# Patient Record
Sex: FEMALE | Race: WHITE | NOT HISPANIC OR LATINO | Employment: UNEMPLOYED | URBAN - METROPOLITAN AREA
[De-identification: names, ages, dates, MRNs, and addresses within clinical notes are randomized per-mention and may not be internally consistent; named-entity substitution may affect disease eponyms.]

---

## 2018-02-11 ENCOUNTER — HOSPITAL ENCOUNTER (EMERGENCY)
Facility: CLINIC | Age: 28
Discharge: HOME | End: 2018-02-11
Attending: PHYSICIAN ASSISTANT | Admitting: PHYSICIAN ASSISTANT
Payer: COMMERCIAL

## 2018-02-11 VITALS
SYSTOLIC BLOOD PRESSURE: 143 MMHG | WEIGHT: 255 LBS | TEMPERATURE: 97.8 F | DIASTOLIC BLOOD PRESSURE: 83 MMHG | OXYGEN SATURATION: 96 % | RESPIRATION RATE: 16 BRPM | HEART RATE: 109 BPM

## 2018-02-11 DIAGNOSIS — J02.0 ACUTE STREPTOCOCCAL PHARYNGITIS: ICD-10-CM

## 2018-02-11 LAB
INTERNAL QC OK POCT: YES
S PYO AG THROAT QL IA.RAPID: POSITIVE

## 2018-02-11 PROCEDURE — G0463 HOSPITAL OUTPT CLINIC VISIT: HCPCS

## 2018-02-11 PROCEDURE — 99213 OFFICE O/P EST LOW 20 MIN: CPT | Performed by: NURSE PRACTITIONER

## 2018-02-11 PROCEDURE — 87880 STREP A ASSAY W/OPTIC: CPT | Performed by: NURSE PRACTITIONER

## 2018-02-11 RX ORDER — AMOXICILLIN 500 MG/1
500 CAPSULE ORAL 2 TIMES DAILY
Qty: 20 CAPSULE | Refills: 0 | Status: SHIPPED | OUTPATIENT
Start: 2018-02-11 | End: 2018-02-21

## 2018-02-11 NOTE — ED AVS SNAPSHOT
[facility] Emergency Department    [address] [place] [address]    Phone:  [phone]    Fax:  [phone]                                       Huong Meredith   MRN: [id number]    Department:  [facility] Emergency Department   Date of Visit:  2/11/2018           Patient Information     Date Of Birth          1990        Your diagnoses for this visit were:     Acute streptococcal pharyngitis        You were seen by Arian Case PA-C and Geraldine Garcia APRN CNP.      Follow-up Information     Follow up with Jensen Patterson.    Specialty:  Family Practice    Why:  As needed    Contact information:    [facility]  [address] [place] [address]  [phone]          Discharge Instructions         Pharyngitis: Strep (Confirmed)    You have had a positive test for strep throat. Strep throat is a contagious illness. It is spread by coughing, kissing or by touching others after touching your mouth or nose. Symptoms include throat pain that is worse with swallowing, aching all over, headache, and fever. It is treated with antibiotic medicine. This should help you start to feel better in 1 to 2 days.  Home care    Rest at home. Drink plenty of fluids to you won't get dehydrated.    No work or school for the first 2 days of taking the antibiotics. After this time, you will not be contagious. You can then return to school or work if you are feeling better.     Take antibiotic medicine for the full 10 days, even if you feel better. This is very important to ensure the infection is treated. It is also important to prevent medicine-resistant germs from developing. If you were given an antibiotic shot, you don't need any more antibiotics.    You may use acetaminophen or ibuprofen to control pain or fever, unless another medicine was prescribed for this. Talk with your doctor before taking these medicines if you have chronic liver or kidney disease. Also talk with your  doctor if you have had a stomach ulcer or GI bleeding.    Throat lozenges or sprays help reduce pain. Gargling with warm saltwater will also reduce throat pain. Dissolve 1/2 teaspoon of salt in 1 glass of warm water. This may be useful just before meals.     Soft foods are OK. Avoid salty or spicy foods.  Follow-up care  Follow up with your healthcare provider or our staff if you don't get better over the next week.  When to seek medical advice  Call your healthcare provider right away if any of these occur:    Fever of 100.4 F (38 C) or higher, or as directed by your healthcare provider    New or worsening ear pain, sinus pain, or headache    Painful lumps in the back of neck    Stiff neck    Lymph nodes getting larger or becoming soft in the middle    You can't swallow liquids or you can't open your mouth wide because of throat pain    Signs of dehydration. These include very dark urine or no urine, sunken eyes, and dizziness.    Trouble breathing or noisy breathing    Muffled voice    Rash  Date Last Reviewed: 4/13/2015 2000-2017 [employer]. [address] [place] [address]. All rights reserved. This information is not intended as a substitute for professional medical care. Always follow your healthcare professional's instructions.          24 Hour Appointment Hotline       To make an appointment at any [facility] clinic, call [phone] ([phone]). If you don't have a family doctor or clinic, we will help you find one. [facility] clinics are conveniently located to serve the needs of you and your family.             Review of your medicines      START taking        Dose / Directions Last dose taken    amoxicillin 500 MG capsule   Commonly known as:  AMOXIL   Dose:  500 mg   Quantity:  20 capsule        Take 1 capsule (500 mg) by mouth 2 times daily for 10 days   Refills:  0                Prescriptions were sent or printed at these locations (1 Prescription)                  "  [facility] [place], [place] - [address]   [address] [facility] [address] [facility] [address]    Telephone:  [phone]   Fax:  [phone]   Hours:                  E-Prescribed (1 of 1)         amoxicillin (AMOXIL) 500 MG capsule                Procedures and tests performed during your visit     Rapid strep group A screen POCT      Orders Needing Specimen Collection     None      Pending Results     No orders found from 2/9/2018 to 2/12/2018.            Pending Culture Results     No orders found from 2/9/2018 to 2/12/2018.            Pending Results Instructions     If you had any lab results that were not finalized at the time of your Discharge, you can call the ED Lab Result RN at [phone]. You will be contacted by this team for any positive Lab results or changes in treatment. The nurses are available 7 days a week from 10A to 6:30P.  You can leave a message 24 hours per day and they will return your call.        Test Results From Your Hospital Stay        2/11/2018 12:53 PM      Component Results     Component Value Ref Range & Units Status    Rapid Strep A Screen Positive neg Final    Internal QC OK Yes  Final                Thank you for choosing [facility]       Thank you for choosing [facility] for your care. Our goal is always to provide you with excellent care. Hearing back from our patients is one way we can continue to improve our services. Please take a few minutes to complete the written survey that you may receive in the mail after you visit with us. Thank you!        [employer] Information     [employer] lets you send messages to your doctor, view your test results, renew your prescriptions, schedule appointments and more. To sign up, go to [email or web address] . Click on \"Log in\" on the left side of the screen, which will take you to the Welcome page. Then click on \"Sign up Now\" on the right side of the page.     You will be asked to enter the access code listed below, as well as some " personal information. Please follow the directions to create your username and password.     Your access code is: [email or web address]  Expires: 2018 12:53 PM     Your access code will  in 90 days. If you need help or a new code, please call your [facility] clinic or [phone].        Care EveryWhere ID     This is your Care EveryWhere ID. This could be used by other organizations to access your [facility] medical records  [id number]        Equal Access to Services     [facility] : Hadnalini castilloo Sodana, waaxda luqadaha, qaybta kaalmada adecatarina, jeronimo sadler . So [facility] [phone].    ATENCIÓN: Si habla español, tiene a covington disposición servicios gratuitos de asistencia lingüística. Llame al [phone].    We comply with applicable federal civil rights laws and [place] laws. We do not discriminate on the basis of race, color, national origin, age, disability, sex, sexual orientation, or gender identity.            After Visit Summary       This is your record. Keep this with you and show to your community pharmacist(s) and doctor(s) at your next visit.

## 2018-02-11 NOTE — ED PROVIDER NOTES
History     Chief Complaint   Patient presents with     Pharyngitis     HPI  Huong Meredith is a 27 year old female who presents to urgent care for evaluation of sore throat.  Symptoms started yesterday.  Denies cough or nasal congestion.  Denies fever or chills.  Denies chest pain or shortness of breath.  Exposed to 3 children with similar symptoms were also being evaluated here in urgent care today.  Patient is a non-smoker.    Problem List:    There are no active problems to display for this patient.       Past Medical History:    History reviewed. No pertinent past medical history.    Past Surgical History:    History reviewed. No pertinent surgical history.    Family History:    No family history on file.    Social History:  Marital Status:  Single [1]  Social History   Substance Use Topics     Smoking status: Never Smoker     Smokeless tobacco: Never Used     Alcohol use Not on file        Medications:      amoxicillin (AMOXIL) 500 MG capsule         Review of Systems  As mentioned above in the history present illness. All other systems were reviewed and are negative.    Physical Exam   BP: 143/83  Pulse: 109  Temp: 97.8  F (36.6  C)  Resp: 16  Weight: 115.7 kg (255 lb)  SpO2: 96 %      Physical Exam  GENERAL APPEARANCE: healthy, alert and no distress  EYES: EOMI,  PERRL, conjunctiva clear  HENT: ear canals and TM's normal.  Nose normal.  Posterior oropharynx erythema without exudate.  Uvula is midline.  No unilateral peritonsillar swelling.  NECK: supple, nontender, no lymphadenopathy  RESP: lungs clear to auscultation - no rales, rhonchi or wheezes  CV: regular rates and rhythm, normal S1 S2, no murmur noted    ED Course     ED Course     Procedures             Results for orders placed or performed during the hospital encounter of 02/11/18 (from the past 48 hour(s))   Rapid strep group A screen POCT   Result Value Ref Range    Rapid Strep A Screen Positive neg    Internal QC OK Yes        Labs Ordered  and Resulted from Time of ED Arrival Up to the Time of Departure from the ED   RAPID STREP GROUP A SCREEN POCT - Abnormal; Notable for the following:        Assessments & Plan (with Medical Decision Making)   RST positive.  Patient will be initiated on Amoxicillin.  Patient and family notified contagious for 24 hours after initiating antibiotics. Recommend replacement of toothbrush at that time.  Follow up with PCP if no improvement in three days.  Worrisome reasons to seek care sooner discussed.      I have reviewed the nursing notes.    I have reviewed the findings, diagnosis, plan and need for follow up with the patient.      New Prescriptions    AMOXICILLIN (AMOXIL) 500 MG CAPSULE    Take 1 capsule (500 mg) by mouth 2 times daily for 10 days       Final diagnoses:   Acute streptococcal pharyngitis       2/11/2018   [facility] EMERGENCY DEPARTMENT     Geraldine Garcia APRN CNP  02/11/18 [id number]

## 2018-02-11 NOTE — DISCHARGE INSTRUCTIONS
Pharyngitis: Strep (Confirmed)    You have had a positive test for strep throat. Strep throat is a contagious illness. It is spread by coughing, kissing or by touching others after touching your mouth or nose. Symptoms include throat pain that is worse with swallowing, aching all over, headache, and fever. It is treated with antibiotic medicine. This should help you start to feel better in 1 to 2 days.  Home care    Rest at home. Drink plenty of fluids to you won't get dehydrated.    No work or school for the first 2 days of taking the antibiotics. After this time, you will not be contagious. You can then return to school or work if you are feeling better.     Take antibiotic medicine for the full 10 days, even if you feel better. This is very important to ensure the infection is treated. It is also important to prevent medicine-resistant germs from developing. If you were given an antibiotic shot, you don't need any more antibiotics.    You may use acetaminophen or ibuprofen to control pain or fever, unless another medicine was prescribed for this. Talk with your doctor before taking these medicines if you have chronic liver or kidney disease. Also talk with your doctor if you have had a stomach ulcer or GI bleeding.    Throat lozenges or sprays help reduce pain. Gargling with warm saltwater will also reduce throat pain. Dissolve 1/2 teaspoon of salt in 1 glass of warm water. This may be useful just before meals.     Soft foods are OK. Avoid salty or spicy foods.  Follow-up care  Follow up with your healthcare provider or our staff if you don't get better over the next week.  When to seek medical advice  Call your healthcare provider right away if any of these occur:    Fever of 100.4 F (38 C) or higher, or as directed by your healthcare provider    New or worsening ear pain, sinus pain, or headache    Painful lumps in the back of neck    Stiff neck    Lymph nodes getting larger or becoming soft in the  middle    You can't swallow liquids or you can't open your mouth wide because of throat pain    Signs of dehydration. These include very dark urine or no urine, sunken eyes, and dizziness.    Trouble breathing or noisy breathing    Muffled voice    Rash  Date Last Reviewed: 4/13/2015 2000-2017 [employer]. [address] [place] [address], [place] [address]. All rights reserved. This information is not intended as a substitute for professional medical care. Always follow your healthcare professional's instructions.

## 2018-02-11 NOTE — ED NOTES
Patient here for cough/URI, symptoms started 7 days ago.  Patient presents ambulatory to the urgent care.  Rapid strep ordered per protocol.

## 2018-02-11 NOTE — ED AVS SNAPSHOT
[facility] Emergency Department    [address] [address]    Phone:  [phone]    Fax:  [phone]                                       Huong Meredith   MRN: [id number]    Department:  [facility] Emergency Department   Date of Visit:  2/11/2018           After Visit Summary Signature Page     I have received my discharge instructions, and my questions have been answered. I have discussed any challenges I see with this plan with the nurse or doctor.    ..........................................................................................................................................  Patient/Patient Representative Signature      ..........................................................................................................................................  Patient Representative Print Name and Relationship to Patient    ..................................................               ................................................  Date                                            Time    ..........................................................................................................................................  Reviewed by Signature/Title    ...................................................              ..............................................  Date                                                            Time

## 2020-09-14 ENCOUNTER — APPOINTMENT (OUTPATIENT)
Dept: GENERAL RADIOLOGY | Facility: CLINIC | Age: 30
End: 2020-09-14
Attending: FAMILY MEDICINE
Payer: COMMERCIAL

## 2020-09-14 ENCOUNTER — HOSPITAL ENCOUNTER (EMERGENCY)
Facility: CLINIC | Age: 30
Discharge: HOME | End: 2020-09-14
Attending: FAMILY MEDICINE | Admitting: FAMILY MEDICINE
Payer: COMMERCIAL

## 2020-09-14 ENCOUNTER — APPOINTMENT (OUTPATIENT)
Dept: CT IMAGING | Facility: CLINIC | Age: 30
End: 2020-09-14
Attending: FAMILY MEDICINE
Payer: COMMERCIAL

## 2020-09-14 VITALS
DIASTOLIC BLOOD PRESSURE: 67 MMHG | TEMPERATURE: 97.9 F | HEIGHT: 64 IN | BODY MASS INDEX: 43.71 KG/M2 | HEART RATE: 73 BPM | OXYGEN SATURATION: 100 % | SYSTOLIC BLOOD PRESSURE: 111 MMHG | WEIGHT: 256 LBS | RESPIRATION RATE: 18 BRPM

## 2020-09-14 DIAGNOSIS — R51.9 ACUTE NONINTRACTABLE HEADACHE, UNSPECIFIED HEADACHE TYPE: ICD-10-CM

## 2020-09-14 DIAGNOSIS — R20.2 PARESTHESIAS: ICD-10-CM

## 2020-09-14 DIAGNOSIS — H65.191 ACUTE MEE (MIDDLE EAR EFFUSION), RIGHT: ICD-10-CM

## 2020-09-14 LAB
ANION GAP SERPL CALCULATED.3IONS-SCNC: 5 MMOL/L (ref 3–14)
APTT PPP: 30 SEC (ref 22–37)
B-HCG SERPL-ACNC: <1 IU/L (ref 0–5)
BASOPHILS # BLD AUTO: 0.1 10E9/L (ref 0–0.2)
BASOPHILS NFR BLD AUTO: 0.6 %
BUN SERPL-MCNC: 9 MG/DL (ref 7–30)
CALCIUM SERPL-MCNC: 9 MG/DL (ref 8.5–10.1)
CHLORIDE SERPL-SCNC: 106 MMOL/L (ref 94–109)
CO2 SERPL-SCNC: 26 MMOL/L (ref 20–32)
CREAT SERPL-MCNC: 0.79 MG/DL (ref 0.52–1.04)
DIFFERENTIAL METHOD BLD: ABNORMAL
EOSINOPHIL # BLD AUTO: 0.3 10E9/L (ref 0–0.7)
EOSINOPHIL NFR BLD AUTO: 2.3 %
ERYTHROCYTE [DISTWIDTH] IN BLOOD BY AUTOMATED COUNT: 13.1 % (ref 10–15)
GFR SERPL CREATININE-BSD FRML MDRD: >90 ML/MIN/{1.73_M2}
GLUCOSE SERPL-MCNC: 103 MG/DL (ref 70–99)
HCT VFR BLD AUTO: 42.8 % (ref 35–47)
HGB BLD-MCNC: 14.3 G/DL (ref 11.7–15.7)
IMM GRANULOCYTES # BLD: 0 10E9/L (ref 0–0.4)
IMM GRANULOCYTES NFR BLD: 0.3 %
INR PPP: 1.06 (ref 0.86–1.14)
LYMPHOCYTES # BLD AUTO: 3.9 10E9/L (ref 0.8–5.3)
LYMPHOCYTES NFR BLD AUTO: 33.1 %
MCH RBC QN AUTO: 28.5 PG (ref 26.5–33)
MCHC RBC AUTO-ENTMCNC: 33.4 G/DL (ref 31.5–36.5)
MCV RBC AUTO: 85 FL (ref 78–100)
MONOCYTES # BLD AUTO: 0.6 10E9/L (ref 0–1.3)
MONOCYTES NFR BLD AUTO: 5.1 %
NEUTROPHILS # BLD AUTO: 7 10E9/L (ref 1.6–8.3)
NEUTROPHILS NFR BLD AUTO: 58.6 %
NRBC # BLD AUTO: 0 10*3/UL
NRBC BLD AUTO-RTO: 0 /100
PLATELET # BLD AUTO: 297 10E9/L (ref 150–450)
POTASSIUM SERPL-SCNC: 3.5 MMOL/L (ref 3.4–5.3)
RBC # BLD AUTO: 5.01 10E12/L (ref 3.8–5.2)
SODIUM SERPL-SCNC: 137 MMOL/L (ref 133–144)
TROPONIN I SERPL-MCNC: <0.015 UG/L (ref 0–0.04)
WBC # BLD AUTO: 11.9 10E9/L (ref 4–11)

## 2020-09-14 PROCEDURE — 84702 CHORIONIC GONADOTROPIN TEST: CPT | Performed by: FAMILY MEDICINE

## 2020-09-14 PROCEDURE — 85610 PROTHROMBIN TIME: CPT | Performed by: FAMILY MEDICINE

## 2020-09-14 PROCEDURE — 25800030 ZZH RX IP 258 OP 636: Performed by: FAMILY MEDICINE

## 2020-09-14 PROCEDURE — 99285 EMERGENCY DEPT VISIT HI MDM: CPT | Mod: 25 | Performed by: FAMILY MEDICINE

## 2020-09-14 PROCEDURE — 85025 COMPLETE CBC W/AUTO DIFF WBC: CPT | Performed by: FAMILY MEDICINE

## 2020-09-14 PROCEDURE — 25000125 ZZHC RX 250: Performed by: FAMILY MEDICINE

## 2020-09-14 PROCEDURE — 70496 CT ANGIOGRAPHY HEAD: CPT

## 2020-09-14 PROCEDURE — 72040 X-RAY EXAM NECK SPINE 2-3 VW: CPT

## 2020-09-14 PROCEDURE — 93005 ELECTROCARDIOGRAM TRACING: CPT | Performed by: FAMILY MEDICINE

## 2020-09-14 PROCEDURE — 93010 ELECTROCARDIOGRAM REPORT: CPT | Mod: Z6 | Performed by: FAMILY MEDICINE

## 2020-09-14 PROCEDURE — 84484 ASSAY OF TROPONIN QUANT: CPT | Performed by: FAMILY MEDICINE

## 2020-09-14 PROCEDURE — 85730 THROMBOPLASTIN TIME PARTIAL: CPT | Performed by: FAMILY MEDICINE

## 2020-09-14 PROCEDURE — 25000128 H RX IP 250 OP 636: Performed by: FAMILY MEDICINE

## 2020-09-14 PROCEDURE — 80048 BASIC METABOLIC PNL TOTAL CA: CPT | Performed by: FAMILY MEDICINE

## 2020-09-14 PROCEDURE — 96360 HYDRATION IV INFUSION INIT: CPT | Mod: 59 | Performed by: FAMILY MEDICINE

## 2020-09-14 PROCEDURE — 70450 CT HEAD/BRAIN W/O DYE: CPT

## 2020-09-14 RX ORDER — IOPAMIDOL 755 MG/ML
70 INJECTION, SOLUTION INTRAVASCULAR ONCE
Status: COMPLETED | OUTPATIENT
Start: 2020-09-14 | End: 2020-09-14

## 2020-09-14 RX ORDER — CYCLOBENZAPRINE HCL 10 MG
10 TABLET ORAL 3 TIMES DAILY PRN
Qty: 20 TABLET | Refills: 0 | Status: SHIPPED | OUTPATIENT
Start: 2020-09-14 | End: 2020-09-20

## 2020-09-14 RX ADMIN — SODIUM CHLORIDE 100 ML: 9 INJECTION, SOLUTION INTRAVENOUS at 07:20

## 2020-09-14 RX ADMIN — IOPAMIDOL 70 ML: 755 INJECTION, SOLUTION INTRAVENOUS at 07:20

## 2020-09-14 RX ADMIN — SODIUM CHLORIDE 500 ML: 9 INJECTION, SOLUTION INTRAVENOUS at 06:32

## 2020-09-14 ASSESSMENT — MIFFLIN-ST. JEOR: SCORE: 1876.21

## 2020-09-14 NOTE — DISCHARGE INSTRUCTIONS
Ibuprofen 400 mg p.o. 4 times daily with food.  Flexeril for muscle spasm.  Back and neck rest; this means no heavy lifting or straining.  Ice alternating with heat to the neck and base of skull over the next 48 to 72 hours.  If not improving please follow-up in clinic with your primary care provider.   Patient with one or more new problems requiring additional work-up/treatment.

## 2020-09-14 NOTE — ED AVS SNAPSHOT
[facility] Emergency Department  [address] [address]  Phone:  [phone]  Fax:  [phone]                                    Huong Meredith   MRN: [id number]    Department:  [facility] Emergency Department   Date of Visit:  9/14/2020           After Visit Summary Signature Page    I have received my discharge instructions, and my questions have been answered. I have discussed any challenges I see with this plan with the nurse or doctor.    ..........................................................................................................................................  Patient/Patient Representative Signature      ..........................................................................................................................................  Patient Representative Print Name and Relationship to Patient    ..................................................               ................................................  Date                                   Time    ..........................................................................................................................................  Reviewed by Signature/Title    ...................................................              ..............................................  Date                                               Time          22EPIC Rev 08/18

## 2020-09-14 NOTE — ED TRIAGE NOTES
Pt reports onset of numbness feeling Tuesday afternoon in area of her scalp. Pt then felt episode of zinging/stab from top of scap to posterior neck. [pt states it throbbed for 10 minutes, worse with neck movement. Pt did talk to primary about it Friday, though now concerned that sensation of pins and needles has moved ot other side of head and now has sensation of fullness in right ear.     Pt denies headache hx. Pt states she took tylenol with no change to sx. Pt took Excedrin at midnight with no change.

## 2020-09-14 NOTE — ED NOTES
"Pt reports last period 28 days late and was \"very\" light. Pt states she probably isn't pregnant but is unsure. HCG level ordered.   "

## 2020-09-14 NOTE — ED PROVIDER NOTES
"  History     Chief Complaint   Patient presents with     Headache     c/o numb sensation on part of scalp, area of \"pins and needles\" and now ear fullness on right side     HPI  Huong Meredith is a 30 year old female, past medical history is significant for morbid obesity, environmental allergies, presents the emergency department with concerns of headache of approximately 6 days duration intermittent dull aching with pins and needle sensation initially beginning on the top of her head and now more localized to the left retro-orbital area in conjunction with right ear fullness which is developed over the last 48 hours.  No vision changes such as double vision blurred vision.  There is no nausea or vomiting.  The patient is taken over-the-counter pain medications in the form of ibuprofen and Tylenol without relief.  She states that she spoke briefly to her primary care provider 3 days ago and was told to revisit this in clinic in approximately 1 week.  She presents now because it is worse in the headache now seems to localize more to the left retro-orbital area as described as well as the ear fullness.  She initially denied any other symptoms when questioned in general but however on review of systems identified tingling and numbness in the left fifth digit when she bends her elbow and is using her cell phone.  It feels clumsy.  This is also been going on for about the last 48 hours.  Patient is a vague historian.  She states his headache is unlike anything she is ever had.  She is concerned about a pinched nerve in her neck.      Allergies:  No Known Allergies    Problem List:    There are no active problems to display for this patient.       Past Medical History:    No past medical history on file.    Past Surgical History:    No past surgical history on file.    Family History:    No family history on file.    Social History:  Marital Status:  Single [1]  Social History     Tobacco Use     Smoking status: Never " "Smoker     Smokeless tobacco: Never Used   Substance Use Topics     Alcohol use: Not on file     Drug use: Not on file        Medications:    No current outpatient medications on file.        Review of Systems   All other systems reviewed and are negative.      Physical Exam   BP: 132/81  Pulse: 93  Temp: 97.9  F (36.6  C)  Resp: 18  Height: 162.6 cm (5' 4\")  Weight: 116.1 kg (256 lb)  SpO2: 99 %      Physical Exam  Vitals signs and nursing note reviewed.   Constitutional:       General: She is not in acute distress.     Appearance: Normal appearance. She is obese. She is not ill-appearing.   HENT:      Head: Normocephalic and atraumatic.      Right Ear: Tympanic membrane normal.      Left Ear: Tympanic membrane normal.      Nose: Nose normal.      Mouth/Throat:      Mouth: Mucous membranes are dry.      Pharynx: Oropharynx is clear.   Eyes:      Extraocular Movements: Extraocular movements intact.      Conjunctiva/sclera: Conjunctivae normal.   Neck:      Musculoskeletal: Normal range of motion.   Cardiovascular:      Rate and Rhythm: Normal rate and regular rhythm.      Pulses: Normal pulses.      Heart sounds: Normal heart sounds.   Pulmonary:      Effort: Pulmonary effort is normal.      Breath sounds: Normal breath sounds.   Abdominal:      General: Bowel sounds are normal.      Palpations: Abdomen is soft.   Musculoskeletal: Normal range of motion.   Skin:     General: Skin is warm and dry.      Capillary Refill: Capillary refill takes less than 2 seconds.   Neurological:      General: No focal deficit present.      Mental Status: She is alert and oriented to person, place, and time.      GCS: GCS eye subscore is 4. GCS verbal subscore is 5. GCS motor subscore is 6.      Cranial Nerves: Cranial nerves are intact.      Sensory: Sensation is intact.      Motor: Motor function is intact.      Coordination: Coordination is intact.      Gait: Gait is intact.      Deep Tendon Reflexes: Babinski sign absent on the " right side. Babinski sign absent on the left side.      Reflex Scores:       Tricep reflexes are 2+ on the right side and 2+ on the left side.       Bicep reflexes are 2+ on the right side and 2+ on the left side.       Brachioradialis reflexes are 2+ on the right side and 2+ on the left side.       Patellar reflexes are 2+ on the right side and 2+ on the left side.       Achilles reflexes are 2+ on the right side and 2+ on the left side.  Psychiatric:         Mood and Affect: Mood normal.         Behavior: Behavior normal.         ED Course        Procedures               EKG Interpretation:      Interpreted by Darrel Barrios MD  Time reviewed: Time obtained 6:28 AM time interpreted same 75 bpm sinus rhythm no acute ST-T wave changes.  Normal axis, normal intervals impression normal EKG      Critical Care time:  none               Results for orders placed or performed during the hospital encounter of 09/14/20 (from the past 24 hour(s))   CBC with platelets differential   Result Value Ref Range    WBC 11.9 (H) 4.0 - 11.0 10e9/L    RBC Count 5.01 3.8 - 5.2 10e12/L    Hemoglobin 14.3 11.7 - 15.7 g/dL    Hematocrit 42.8 35.0 - 47.0 %    MCV 85 78 - 100 fl    MCH 28.5 26.5 - 33.0 pg    MCHC 33.4 31.5 - 36.5 g/dL    RDW 13.1 10.0 - 15.0 %    Platelet Count 297 150 - 450 10e9/L    Diff Method Automated Method     % Neutrophils 58.6 %    % Lymphocytes 33.1 %    % Monocytes 5.1 %    % Eosinophils 2.3 %    % Basophils 0.6 %    % Immature Granulocytes 0.3 %    Nucleated RBCs 0 0 /100    Absolute Neutrophil 7.0 1.6 - 8.3 10e9/L    Absolute Lymphocytes 3.9 0.8 - 5.3 10e9/L    Absolute Monocytes 0.6 0.0 - 1.3 10e9/L    Absolute Eosinophils 0.3 0.0 - 0.7 10e9/L    Absolute Basophils 0.1 0.0 - 0.2 10e9/L    Abs Immature Granulocytes 0.0 0 - 0.4 10e9/L    Absolute Nucleated RBC 0.0    Basic metabolic panel   Result Value Ref Range    Sodium 137 133 - 144 mmol/L    Potassium 3.5 3.4 - 5.3 mmol/L    Chloride 106 94 - 109  mmol/L    Carbon Dioxide 26 20 - 32 mmol/L    Anion Gap 5 3 - 14 mmol/L    Glucose 103 (H) 70 - 99 mg/dL    Urea Nitrogen 9 7 - 30 mg/dL    Creatinine 0.79 0.52 - 1.04 mg/dL    GFR Estimate >90 >60 mL/min/[1.73_m2]    GFR Estimate If Black >90 >60 mL/min/[1.73_m2]    Calcium 9.0 8.5 - 10.1 mg/dL   INR   Result Value Ref Range    INR 1.06 0.86 - 1.14   Partial thromboplastin time   Result Value Ref Range    PTT 30 22 - 37 sec   Troponin I   Result Value Ref Range    Troponin I ES <0.015 0.000 - 0.045 ug/L   HCG quantitative pregnancy   Result Value Ref Range    HCG Quantitative Serum <1 0 - 5 IU/L   CT Head w/o Contrast    Narrative    CT SCAN OF THE HEAD WITHOUT CONTRAST September 14, 2020 7:44 AM     HISTORY: Headache, left upper extremity paresthesias.    TECHNIQUE: Axial images of the head and coronal reformations without  IV contrast material. Radiation dose for this scan was reduced using  automated exposure control, adjustment of the mA and/or kV according  to patient size, or iterative reconstruction technique.    COMPARISON: None.    FINDINGS: The ventricles are normal in size, shape and configuration.  The brain parenchyma and subarachnoid spaces are normal. There is no  evidence of intracranial hemorrhage, mass, acute infarct or anomaly.     The visualized portions of the sinuses and mastoids appear normal.  There is no evidence of trauma.      Impression    IMPRESSION: Normal CT scan of the head.     ANDER BURCIAGA MD   CTA Head Neck with Contrast    Narrative    CTA  HEAD NECK WITH CONTRAST  9/14/2020 7:44 AM     HISTORY: Headache, left upper extremity paresthesias.    TECHNIQUE: Multiplanar multisequence images were obtained through the  head and neck with intravenous contrast. 70 mL of Isovue-370 was  given. Multiplanar reconstructions were performed. 3-D reconstructions  off a remote workstation for CT angiography were also acquired.  Carotid stenoses were evaluated by comparing the caliber of  the  proximal internal carotid artery to the caliber of the distal internal  carotid artery. Radiation dose for this scan was reduced using  automated exposure control, adjustment of the mA and/or kV according  to patient size, or iterative reconstruction technique.    FINDINGS:  Brachiocephalic vessels: Normal.    Right carotid system: Normal.    Left carotid system: Normal.    Right vertebral artery: Normal.    Left vertebral artery: Normal.    Nikolai of Perez: Normal. There is a small infundibulum of the left  posterior commuting artery origin of the internal carotid artery. No  convincing evidence for aneurysm.    Other findings: None.      Impression    IMPRESSION: Negative CT angiography of the head and neck.    ANDER BURCIAGA MD   XR Cervical Spine 2/3 Views    Narrative    CERVICAL SPINE TWO TO THREE VIEWS  9/14/2020 8:37 AM     HISTORY:  Headache. Left hand paresthesias.    COMPARISON: None.       Impression    IMPRESSION: Straightening of the normal cervical lordosis which may be  positional. No significant loss of vertebral body height or  prevertebral soft tissue swelling. Mild early degenerative endplate  changes and loss of intervertebral disc space at C3-C4. The base of  the dens is unremarkable on the odontoid view.     GONZALO MARTINEZ MD       Medications   0.9% sodium chloride BOLUS (0 mLs Intravenous Stopped 9/14/20 0747)   iopamidol (ISOVUE-370) solution 70 mL (70 mLs Intravenous Given 9/14/20 0720)   sodium chloride 0.9 % bag 500mL for CT scan flush use (100 mLs Intravenous Given 9/14/20 0720)     10:12 AM  Recheck finds the patient comfortable.  We reviewed all of her imaging and lab diagnostics and answered her questions.  Assessments & Plan (with Medical Decision Making)   30-year-old female past medical history reviewed as above who presents for evaluation of concerns of headache and paresthesias as described in the HPI.  No focal findings on exam and symmetric nonlateralizing neurologic exam.   Lab diagnostics and imaging are reviewed as above.  I suspect that the patient's headache is secondary to some cervical spasm although she does not verbalize concerns of neck pain the appearance on x-ray with loss of normal cervical lordosis in this context as well as the early degenerative changes in her C-spine.  She feels that this is quite likely given her stress level acutely.  Plan conservative management with alternating warm and cold packs, Tylenol/ibuprofen, Flexeril for spasm and to follow-up in clinic with her primary care provider if not improving over the next 7 to 10 days.      Disclaimer: This note consists of symbols derived from keyboarding, dictation and/or voice recognition software. As a result, there may be errors in the script that have gone undetected. Please consider this when interpreting information found in this chart.      I have reviewed the nursing notes.    I have reviewed the findings, diagnosis, plan and need for follow up with the patient.          New Prescriptions    No medications on file       Final diagnoses:   Acute nonintractable headache, unspecified headache type - Likely secondary to cervical spasm.   Paresthesias   Acute GRACE (middle ear effusion), right       9/14/2020   [facility] EMERGENCY DEPARTMENT     Darrel Barrios MD  09/14/20 3913

## 2021-05-27 ENCOUNTER — HOSPITAL ENCOUNTER (EMERGENCY)
Facility: CLINIC | Age: 31
Discharge: HOME | End: 2021-05-28
Attending: EMERGENCY MEDICINE | Admitting: EMERGENCY MEDICINE
Payer: COMMERCIAL

## 2021-05-27 DIAGNOSIS — K57.32 DIVERTICULITIS OF COLON: ICD-10-CM

## 2021-05-27 DIAGNOSIS — R10.32 LLQ ABDOMINAL PAIN: ICD-10-CM

## 2021-05-27 PROCEDURE — 81001 URINALYSIS AUTO W/SCOPE: CPT | Performed by: EMERGENCY MEDICINE

## 2021-05-27 PROCEDURE — 81025 URINE PREGNANCY TEST: CPT | Performed by: EMERGENCY MEDICINE

## 2021-05-27 PROCEDURE — 99284 EMERGENCY DEPT VISIT MOD MDM: CPT | Performed by: EMERGENCY MEDICINE

## 2021-05-27 PROCEDURE — 99285 EMERGENCY DEPT VISIT HI MDM: CPT | Mod: 25 | Performed by: EMERGENCY MEDICINE

## 2021-05-27 PROCEDURE — 80053 COMPREHEN METABOLIC PANEL: CPT | Performed by: EMERGENCY MEDICINE

## 2021-05-27 PROCEDURE — 85025 COMPLETE CBC W/AUTO DIFF WBC: CPT | Performed by: EMERGENCY MEDICINE

## 2021-05-27 ASSESSMENT — MIFFLIN-ST. JEOR: SCORE: 1894.35

## 2021-05-28 ENCOUNTER — APPOINTMENT (OUTPATIENT)
Dept: CT IMAGING | Facility: CLINIC | Age: 31
End: 2021-05-28
Attending: EMERGENCY MEDICINE
Payer: COMMERCIAL

## 2021-05-28 VITALS
HEIGHT: 64 IN | DIASTOLIC BLOOD PRESSURE: 65 MMHG | HEART RATE: 75 BPM | TEMPERATURE: 98.4 F | OXYGEN SATURATION: 98 % | WEIGHT: 260 LBS | RESPIRATION RATE: 16 BRPM | SYSTOLIC BLOOD PRESSURE: 105 MMHG | BODY MASS INDEX: 44.39 KG/M2

## 2021-05-28 PROBLEM — F41.9 ANXIETY: Status: ACTIVE | Noted: 2018-06-13

## 2021-05-28 PROBLEM — K57.30 DIVERTICULOSIS OF LARGE INTESTINE: Status: ACTIVE | Noted: 2018-06-13

## 2021-05-28 PROBLEM — F32.A DEPRESSIVE DISORDER: Status: ACTIVE | Noted: 2021-04-02

## 2021-05-28 PROBLEM — K64.8 HEMORRHOIDS, INTERNAL: Status: ACTIVE | Noted: 2018-06-13

## 2021-05-28 PROBLEM — Z86.0100 HISTORY OF COLONIC POLYPS: Status: ACTIVE | Noted: 2018-06-13

## 2021-05-28 LAB
ALBUMIN SERPL-MCNC: 3.3 G/DL (ref 3.4–5)
ALBUMIN UR-MCNC: NEGATIVE MG/DL
ALP SERPL-CCNC: 132 U/L (ref 40–150)
ALT SERPL W P-5'-P-CCNC: 21 U/L (ref 0–50)
AMORPH CRY #/AREA URNS HPF: ABNORMAL /HPF
ANION GAP SERPL CALCULATED.3IONS-SCNC: 2 MMOL/L (ref 3–14)
APPEARANCE UR: ABNORMAL
AST SERPL W P-5'-P-CCNC: 14 U/L (ref 0–45)
BACTERIA #/AREA URNS HPF: ABNORMAL /HPF
BASOPHILS # BLD AUTO: 0.1 10E9/L (ref 0–0.2)
BASOPHILS NFR BLD AUTO: 0.4 %
BILIRUB SERPL-MCNC: 0.3 MG/DL (ref 0.2–1.3)
BILIRUB UR QL STRIP: NEGATIVE
BUN SERPL-MCNC: 10 MG/DL (ref 7–30)
CALCIUM SERPL-MCNC: 9 MG/DL (ref 8.5–10.1)
CHLORIDE SERPL-SCNC: 108 MMOL/L (ref 94–109)
CO2 SERPL-SCNC: 29 MMOL/L (ref 20–32)
COLOR UR AUTO: YELLOW
CREAT SERPL-MCNC: 0.79 MG/DL (ref 0.52–1.04)
DIFFERENTIAL METHOD BLD: ABNORMAL
EOSINOPHIL # BLD AUTO: 0.3 10E9/L (ref 0–0.7)
EOSINOPHIL NFR BLD AUTO: 2.2 %
ERYTHROCYTE [DISTWIDTH] IN BLOOD BY AUTOMATED COUNT: 12.6 % (ref 10–15)
GFR SERPL CREATININE-BSD FRML MDRD: >90 ML/MIN/{1.73_M2}
GLUCOSE SERPL-MCNC: 113 MG/DL (ref 70–99)
GLUCOSE UR STRIP-MCNC: NEGATIVE MG/DL
HCG UR QL: NEGATIVE
HCT VFR BLD AUTO: 43.9 % (ref 35–47)
HGB BLD-MCNC: 14.3 G/DL (ref 11.7–15.7)
HGB UR QL STRIP: ABNORMAL
IMM GRANULOCYTES # BLD: 0.1 10E9/L (ref 0–0.4)
IMM GRANULOCYTES NFR BLD: 0.5 %
KETONES UR STRIP-MCNC: 5 MG/DL
LEUKOCYTE ESTERASE UR QL STRIP: NEGATIVE
LYMPHOCYTES # BLD AUTO: 3.1 10E9/L (ref 0.8–5.3)
LYMPHOCYTES NFR BLD AUTO: 20.2 %
MCH RBC QN AUTO: 28.3 PG (ref 26.5–33)
MCHC RBC AUTO-ENTMCNC: 32.6 G/DL (ref 31.5–36.5)
MCV RBC AUTO: 87 FL (ref 78–100)
MONOCYTES # BLD AUTO: 0.8 10E9/L (ref 0–1.3)
MONOCYTES NFR BLD AUTO: 5.3 %
MUCOUS THREADS #/AREA URNS LPF: PRESENT /LPF
NEUTROPHILS # BLD AUTO: 11.1 10E9/L (ref 1.6–8.3)
NEUTROPHILS NFR BLD AUTO: 71.4 %
NITRATE UR QL: NEGATIVE
NRBC # BLD AUTO: 0 10*3/UL
NRBC BLD AUTO-RTO: 0 /100
PH UR STRIP: 6 PH (ref 5–7)
PLATELET # BLD AUTO: 323 10E9/L (ref 150–450)
POTASSIUM SERPL-SCNC: 4 MMOL/L (ref 3.4–5.3)
PROT SERPL-MCNC: 7.6 G/DL (ref 6.8–8.8)
RBC # BLD AUTO: 5.06 10E12/L (ref 3.8–5.2)
RBC #/AREA URNS AUTO: 1 /HPF (ref 0–2)
SODIUM SERPL-SCNC: 139 MMOL/L (ref 133–144)
SOURCE: ABNORMAL
SP GR UR STRIP: 1.02 (ref 1–1.03)
SQUAMOUS #/AREA URNS AUTO: 2 /HPF (ref 0–1)
UROBILINOGEN UR STRIP-MCNC: 2 MG/DL (ref 0–2)
WBC # BLD AUTO: 15.5 10E9/L (ref 4–11)
WBC #/AREA URNS AUTO: 1 /HPF (ref 0–5)

## 2021-05-28 PROCEDURE — 74177 CT ABD & PELVIS W/CONTRAST: CPT

## 2021-05-28 PROCEDURE — 250N000011 HC RX IP 250 OP 636: Performed by: EMERGENCY MEDICINE

## 2021-05-28 PROCEDURE — 250N000013 HC RX MED GY IP 250 OP 250 PS 637: Performed by: EMERGENCY MEDICINE

## 2021-05-28 PROCEDURE — 250N000009 HC RX 250: Performed by: EMERGENCY MEDICINE

## 2021-05-28 RX ORDER — IOPAMIDOL 755 MG/ML
100 INJECTION, SOLUTION INTRAVASCULAR ONCE
Status: COMPLETED | OUTPATIENT
Start: 2021-05-28 | End: 2021-05-28

## 2021-05-28 RX ADMIN — AMOXICILLIN AND CLAVULANATE POTASSIUM 1 TABLET: 875; 125 TABLET, FILM COATED ORAL at 02:21

## 2021-05-28 RX ADMIN — IOPAMIDOL 100 ML: 755 INJECTION, SOLUTION INTRAVENOUS at 00:40

## 2021-05-28 RX ADMIN — SODIUM CHLORIDE 70 ML: 9 INJECTION, SOLUTION INTRAVENOUS at 00:40

## 2021-05-28 ASSESSMENT — ENCOUNTER SYMPTOMS
HEADACHES: 0
CHILLS: 0
ABDOMINAL PAIN: 1
VOMITING: 0
SORE THROAT: 0
MYALGIAS: 0
EYE PAIN: 0
FEVER: 0
NAUSEA: 0
LIGHT-HEADEDNESS: 0
EYE REDNESS: 0
BLOOD IN STOOL: 0
COUGH: 0
BACK PAIN: 0

## 2021-05-28 NOTE — ED PROVIDER NOTES
History     Chief Complaint   Patient presents with     Abdominal Pain     since last night LLQ pain, feels like child birth cramping     HPI  Huong Meredith is a 30 year old female with history of diverticulosis, internal hemorrhoids, obesity, anxiety depression, colonic polyps, with 2 days of left lower quadrant abdominal pain.  Pain started last evening is described as sharp and cramping.  Pain can radiate across her lower abdomen from left to right.  Says her symptoms remind her of labor pain.  Waxing and waning intensity.  5 out of 10 when active and 2 out of 10 with an improved.  Pain worsened by walking, coughing, or laughing.  Some improvement with rest but does not go away.  She had a Mirena IUD placed 3 months ago and has had fairly continual spotting since that time but no abnormal vaginal discharge.  Denies any fevers, chills, nausea vomiting or recent diarrhea.  No blood in her stool.  Has been taking ibuprofen at home for pain.    The patient's PMHx, Surgical Hx, Allergies, and Medications were all reviewed with the patient.    Allergies:  Allergies   Allergen Reactions     Buspirone Rash     Calamine Rash     Nickel Chloride  [Nickel] Rash       Problem List:    Patient Active Problem List    Diagnosis Date Noted     Depressive disorder 04/02/2021     Priority: Medium     Anxiety 06/13/2018     Priority: Medium     Diverticulosis of large intestine 06/13/2018     Priority: Medium     Hemorrhoids, internal 06/13/2018     Priority: Medium     History of colonic polyps 06/13/2018     Priority: Medium     Morbid obesity with BMI of 40.0-44.9, adult (H) 01/28/2016     Priority: Medium        Past Medical History:    No past medical history on file.    Past Surgical History:    No past surgical history on file.    Family History:    No family history on file.    Social History:  Marital Status:  Single [1]  Social History     Tobacco Use     Smoking status: Never Smoker     Smokeless tobacco: Never Used  "  Substance Use Topics     Alcohol use: Not on file     Drug use: Not on file        Medications:    amoxicillin-clavulanate (AUGMENTIN) 875-125 MG tablet  levonorgestrel (MIRENA) 20 MCG/24HR IUD          Review of Systems   Constitutional: Negative for chills and fever.   HENT: Negative for sore throat.    Eyes: Negative for pain and redness.   Respiratory: Negative for cough.    Cardiovascular: Negative for chest pain.   Gastrointestinal: Positive for abdominal pain. Negative for blood in stool, nausea and vomiting.   Genitourinary: Positive for vaginal bleeding.   Musculoskeletal: Negative for back pain and myalgias.   Skin: Negative for rash.   Neurological: Negative for light-headedness and headaches.       Physical Exam   BP: 127/77  Pulse: 85  Temp: 98.4  F (36.9  C)  Resp: 16  Height: 162.6 cm (5' 4\")  Weight: 117.9 kg (260 lb)  SpO2: 99 %    Physical Exam  GEN: Awake, alert, and cooperative.  Patiently distress but nontoxic.  HENT: MMM. External ears and nose normal bilaterally.  EYES: EOM intact. Conjunctiva clear. No discharge.   NECK: Symmetric, freely mobile.   CV : Regular rate and rhythm.  Extremities warm and well perfused.  PULM: Normal effort.  Speaking full sentences.  ABD: Soft, nondistended.  Left lower quadrant tenderness to palpation with no involuntary guarding or rebound tenderness.  Pain in left lower quadrant with palpation of right lower quadrant.  NEURO: Normal speech. Following commands. CN II-XII grossly intact. Answering questions and interacting appropriately.   EXT: No gross deformity.  No pitting pedal or pretibial edema  INT: Warm. No diaphoresis. Normal color.        ED Course        Procedures           Critical Care time:  none               Results for orders placed or performed during the hospital encounter of 05/27/21 (from the past 24 hour(s))   CBC with platelets differential   Result Value Ref Range    WBC 15.5 (H) 4.0 - 11.0 10e9/L    RBC Count 5.06 3.8 - 5.2 10e12/L    " Hemoglobin 14.3 11.7 - 15.7 g/dL    Hematocrit 43.9 35.0 - 47.0 %    MCV 87 78 - 100 fl    MCH 28.3 26.5 - 33.0 pg    MCHC 32.6 31.5 - 36.5 g/dL    RDW 12.6 10.0 - 15.0 %    Platelet Count 323 150 - 450 10e9/L    Diff Method Automated Method     % Neutrophils 71.4 %    % Lymphocytes 20.2 %    % Monocytes 5.3 %    % Eosinophils 2.2 %    % Basophils 0.4 %    % Immature Granulocytes 0.5 %    Nucleated RBCs 0 0 /100    Absolute Neutrophil 11.1 (H) 1.6 - 8.3 10e9/L    Absolute Lymphocytes 3.1 0.8 - 5.3 10e9/L    Absolute Monocytes 0.8 0.0 - 1.3 10e9/L    Absolute Eosinophils 0.3 0.0 - 0.7 10e9/L    Absolute Basophils 0.1 0.0 - 0.2 10e9/L    Abs Immature Granulocytes 0.1 0 - 0.4 10e9/L    Absolute Nucleated RBC 0.0    Comprehensive metabolic panel   Result Value Ref Range    Sodium 139 133 - 144 mmol/L    Potassium 4.0 3.4 - 5.3 mmol/L    Chloride 108 94 - 109 mmol/L    Carbon Dioxide 29 20 - 32 mmol/L    Anion Gap 2 (L) 3 - 14 mmol/L    Glucose 113 (H) 70 - 99 mg/dL    Urea Nitrogen 10 7 - 30 mg/dL    Creatinine 0.79 0.52 - 1.04 mg/dL    GFR Estimate >90 >60 mL/min/[1.73_m2]    GFR Estimate If Black >90 >60 mL/min/[1.73_m2]    Calcium 9.0 8.5 - 10.1 mg/dL    Bilirubin Total 0.3 0.2 - 1.3 mg/dL    Albumin 3.3 (L) 3.4 - 5.0 g/dL    Protein Total 7.6 6.8 - 8.8 g/dL    Alkaline Phosphatase 132 40 - 150 U/L    ALT 21 0 - 50 U/L    AST 14 0 - 45 U/L   UA reflex to Microscopic   Result Value Ref Range    Color Urine Yellow     Appearance Urine Cloudy     Glucose Urine Negative NEG^Negative mg/dL    Bilirubin Urine Negative NEG^Negative    Ketones Urine 5 (A) NEG^Negative mg/dL    Specific Gravity Urine 1.025 1.003 - 1.035    Blood Urine Moderate (A) NEG^Negative    pH Urine 6.0 5.0 - 7.0 pH    Protein Albumin Urine Negative NEG^Negative mg/dL    Urobilinogen mg/dL 2.0 0.0 - 2.0 mg/dL    Nitrite Urine Negative NEG^Negative    Leukocyte Esterase Urine Negative NEG^Negative    Source Midstream Urine     RBC Urine 1 0 - 2 /HPF     WBC Urine 1 0 - 5 /HPF    Bacteria Urine Few (A) NEG^Negative /HPF    Squamous Epithelial /HPF Urine 2 (H) 0 - 1 /HPF    Mucous Urine Present (A) NEG^Negative /LPF    Amorphous Crystals Few (A) NEG^Negative /HPF   HCG qualitative urine (UPT)   Result Value Ref Range    HCG Qual Urine Negative NEG^Negative   CT Abdomen Pelvis w Contrast    Narrative    EXAM: CT ABDOMEN PELVIS W CONTRAST  LOCATION: [facility]  DATE/TIME: 5/28/2021 12:40 AM    INDICATION: Left lower quadrant pain.  COMPARISON: 12/30/2020  TECHNIQUE: CT scan of the abdomen and pelvis was performed following injection of IV contrast. Multiplanar reformats were obtained. Dose reduction techniques were used.  CONTRAST: ISOVUE 370-100ml    FINDINGS:   LOWER CHEST: Lung bases clear.    HEPATOBILIARY: Normal.    PANCREAS: Normal.    SPLEEN: Normal.    ADRENAL GLANDS: Normal.    KIDNEYS/BLADDER: Normal.    BOWEL: Normal caliber. Normal appendix. Wall thickening of the sigmoid colon with mild adjacent inflammation. Diverticulosis.    LYMPH NODES: Normal.    VASCULATURE: Unremarkable.    PELVIC ORGANS: IUD.    MUSCULOSKELETAL: Normal.      Impression    IMPRESSION:   1.  Acute diverticulitis sigmoid colon.  2.  No bowel obstruction or abscess.       Medications   iopamidol (ISOVUE-370) solution 100 mL (100 mLs Intravenous Given 5/28/21 0040)   sodium chloride 0.9 % bag 500mL for CT scan flush use (70 mLs As instructed Given 5/28/21 0040)   amoxicillin-clavulanate (AUGMENTIN) 875-125 MG per tablet 1 tablet (1 tablet Oral Given 5/28/21 0221)       Assessments & Plan (with Medical Decision Making)   30 year old female with past medical history significant for diverticulosis with 2 days of colicky left lower quadrant abdominal pain.  On examination she appeared mildly distressed but nontoxic.  She had left lower quadrant tenderness to palpation without any peritoneal signs or abdominal distention.  CBC notable for leukocytosis with left shift.  CMP  grossly normal.  Urinalysis without evidence of acute infection and UPT negative.  Patient does have a IUD in place.    Given her abdominal tenderness will obtain CT scan of abdomen and pelvis.  CT with signs of acute diverticulitis and no appendicitis.  Patient was offered pain medication on the emergency department however declined.  Will treat with course of Augmentin.  She was given initial dose in the emergency department and plan for 7-day course 3 times per day.  Prescription sent to preferred pharmacy.  Follow-up and ED return precautions discussed.  She expressed agreement understanding of plan and was discharged in stable condition.    I have reviewed the nursing notes.         Discharge Medication List as of 5/28/2021  2:17 AM      START taking these medications    Details   amoxicillin-clavulanate (AUGMENTIN) 875-125 MG tablet Take 1 tablet by mouth 3 times daily for 7 days, Disp-21 tablet, R-0, E-Prescribe             Final diagnoses:   LLQ abdominal pain   Diverticulitis of colon     Calvin Roberts MD    5/27/2021   [facility] EMERGENCY DEPT    Disclaimer: This note consists of words and symbols derived from keyboarding and dictation using voice recognition software.  As a result, there may be errors that have gone undetected.  Please consider this when interpreting information found in this note.             Calvin Roberts MD  05/28/21 9316

## 2021-05-28 NOTE — ED TRIAGE NOTES
"Patient states pain in left lower quadrant of abdomen started last night intermittently, today it has become constant with it feeling like \"child birth cramping\" tonight. Patient states pain radiates across abdomen. Denies any urinary symptoms and last BM was earlier today and regular. Patient states she got and IUD about three months ago and has been bleeding intermittently since so concerned this may be related.    "

## 2021-05-28 NOTE — DISCHARGE INSTRUCTIONS
Your CT scan shows that you have diverticulitis.  Take Augmentin 3 times a day for 7 days as prescribed.  Follow-up with your primary care provider this coming week to monitor your response to treatment.  You may take your Profen and/or Tylenol as needed for pain.  I would expect your abdominal pain to continue for several more days.    If you develop fever, persistent nausea vomiting, worsening pain, or other new symptoms you find concerning, you should return to the emergency department immediately for further evaluation and treatment.

## 2021-05-30 ENCOUNTER — HEALTH MAINTENANCE LETTER (OUTPATIENT)
Age: 31
End: 2021-05-30

## 2021-09-19 ENCOUNTER — HEALTH MAINTENANCE LETTER (OUTPATIENT)
Age: 31
End: 2021-09-19

## 2021-09-23 ENCOUNTER — HOSPITAL ENCOUNTER (EMERGENCY)
Facility: CLINIC | Age: 31
Discharge: HOME | End: 2021-09-23
Attending: PHYSICIAN ASSISTANT | Admitting: PHYSICIAN ASSISTANT
Payer: COMMERCIAL

## 2021-09-23 VITALS
TEMPERATURE: 97.1 F | HEART RATE: 78 BPM | OXYGEN SATURATION: 97 % | WEIGHT: 266 LBS | HEIGHT: 64 IN | SYSTOLIC BLOOD PRESSURE: 117 MMHG | DIASTOLIC BLOOD PRESSURE: 78 MMHG | RESPIRATION RATE: 20 BRPM | BODY MASS INDEX: 45.41 KG/M2

## 2021-09-23 DIAGNOSIS — Z20.822 ENCOUNTER FOR LABORATORY TESTING FOR COVID-19 VIRUS: ICD-10-CM

## 2021-09-23 DIAGNOSIS — R07.0 THROAT PAIN: ICD-10-CM

## 2021-09-23 LAB
DEPRECATED S PYO AG THROAT QL EIA: NEGATIVE
GROUP A STREP BY PCR: NOT DETECTED
SARS-COV-2 RNA RESP QL NAA+PROBE: NEGATIVE

## 2021-09-23 PROCEDURE — C9803 HOPD COVID-19 SPEC COLLECT: HCPCS | Performed by: PHYSICIAN ASSISTANT

## 2021-09-23 PROCEDURE — 87651 STREP A DNA AMP PROBE: CPT | Performed by: PHYSICIAN ASSISTANT

## 2021-09-23 PROCEDURE — 99213 OFFICE O/P EST LOW 20 MIN: CPT | Performed by: PHYSICIAN ASSISTANT

## 2021-09-23 PROCEDURE — U0003 INFECTIOUS AGENT DETECTION BY NUCLEIC ACID (DNA OR RNA); SEVERE ACUTE RESPIRATORY SYNDROME CORONAVIRUS 2 (SARS-COV-2) (CORONAVIRUS DISEASE [COVID-19]), AMPLIFIED PROBE TECHNIQUE, MAKING USE OF HIGH THROUGHPUT TECHNOLOGIES AS DESCRIBED BY CMS-2020-01-R: HCPCS | Performed by: PHYSICIAN ASSISTANT

## 2021-09-23 PROCEDURE — G0463 HOSPITAL OUTPT CLINIC VISIT: HCPCS | Performed by: PHYSICIAN ASSISTANT

## 2021-09-23 ASSESSMENT — ENCOUNTER SYMPTOMS
DIFFICULTY URINATING: 0
ABDOMINAL PAIN: 0
FATIGUE: 1
HEADACHES: 1
SORE THROAT: 1
ARTHRALGIAS: 0
CONFUSION: 0
NECK STIFFNESS: 0
FEVER: 0
COLOR CHANGE: 0
SHORTNESS OF BREATH: 0
EYE REDNESS: 0
COUGH: 0

## 2021-09-23 ASSESSMENT — MIFFLIN-ST. JEOR: SCORE: 1916.57

## 2021-09-23 NOTE — DISCHARGE INSTRUCTIONS
Increase fluids, rest, Tylenol and ibuprofen over-the-counter as needed for pain.  Salt water gargles, nasal saline sprays.    Rapid strep today was negative.  Throat culture sent and currently pending.  Covid test sent and currently pending.  Follow CDC guidelines with regards to Covid test results.  You are to remain quarantined at home until you get these test results back.  This can take anywhere from 24 to 72 hours to get back.  You should get a call if test results are positive however if you do not hear from anyone within 24 to 48 hours you can call the nurse line for your test results.    Return if symptoms worsen or change, fevers more than 3 days.

## 2021-09-23 NOTE — ED PROVIDER NOTES
History     Chief Complaint   Patient presents with     Fatigue     covid testing     HPI  Huong Meredith is a 31 year old female who presents today with headache, fatigue, and scratchy throat for past 2 days. No known exposures. Patient not vaccinated against covid 19. No fevers, chills, sweats, no visual changes, ear pain, cough, abdominal pain, nausea/vomiting/diarrhea, or rash. Patient tried tylenol.     Allergies:  Allergies   Allergen Reactions     Buspirone Rash     Calamine Rash     Nickel Chloride  [Nickel] Rash       Problem List:    Patient Active Problem List    Diagnosis Date Noted     Depressive disorder 04/02/2021     Priority: Medium     Anxiety 06/13/2018     Priority: Medium     Diverticulosis of large intestine 06/13/2018     Priority: Medium     Hemorrhoids, internal 06/13/2018     Priority: Medium     History of colonic polyps 06/13/2018     Priority: Medium     Morbid obesity with BMI of 40.0-44.9, adult (H) 01/28/2016     Priority: Medium        Past Medical History:    No past medical history on file.    Past Surgical History:    No past surgical history on file.    Family History:    No family history on file.    Social History:  Marital Status:  Single [1]  Social History     Tobacco Use     Smoking status: Never Smoker     Smokeless tobacco: Never Used   Substance Use Topics     Alcohol use: Not on file     Drug use: Not on file        Medications:    levonorgestrel (MIRENA) 20 MCG/24HR IUD          Review of Systems   Constitutional: Positive for fatigue. Negative for fever.   HENT: Positive for sore throat. Negative for congestion and ear pain.    Eyes: Negative for redness.   Respiratory: Negative for cough and shortness of breath.    Cardiovascular: Negative for chest pain.   Gastrointestinal: Negative for abdominal pain.   Genitourinary: Negative for difficulty urinating.   Musculoskeletal: Negative for arthralgias and neck stiffness.   Skin: Negative for color change.  "  Neurological: Positive for headaches.   Psychiatric/Behavioral: Negative for confusion.   All other systems reviewed and are negative.      Physical Exam   BP: 117/78  Pulse: 78  Temp: 97.1  F (36.2  C)  Resp: 20  Height: 162.6 cm (5' 4\")  Weight: 120.7 kg (266 lb)  SpO2: 97 %      Physical Exam  Vitals and nursing note reviewed.   Constitutional:       General: She is not in acute distress.     Appearance: Normal appearance. She is normal weight. She is not ill-appearing or toxic-appearing.   HENT:      Right Ear: Tympanic membrane and ear canal normal.      Left Ear: Tympanic membrane and ear canal normal.      Nose: Nose normal.      Mouth/Throat:      Mouth: Mucous membranes are moist.      Pharynx: Posterior oropharyngeal erythema present. No oropharyngeal exudate.   Eyes:      General: No scleral icterus.     Extraocular Movements: Extraocular movements intact.      Conjunctiva/sclera: Conjunctivae normal.      Pupils: Pupils are equal, round, and reactive to light.   Cardiovascular:      Rate and Rhythm: Normal rate and regular rhythm.      Heart sounds: Normal heart sounds.   Pulmonary:      Effort: Pulmonary effort is normal.      Breath sounds: Normal breath sounds.   Musculoskeletal:      Cervical back: Normal range of motion and neck supple. No tenderness.   Lymphadenopathy:      Cervical: No cervical adenopathy.   Skin:     General: Skin is warm.      Capillary Refill: Capillary refill takes less than 2 seconds.   Neurological:      General: No focal deficit present.      Mental Status: She is alert and oriented to person, place, and time.   Psychiatric:         Mood and Affect: Mood normal.         Behavior: Behavior normal.         Thought Content: Thought content normal.         Judgment: Judgment normal.         ED Course        Procedures             Critical Care time:  none               Results for orders placed or performed during the hospital encounter of 09/23/21 (from the past 24 hour(s)) "   Streptococcus A Rapid Scr w Reflx to PCR    Specimen: Throat; Swab   Result Value Ref Range    Group A Strep antigen Negative Negative       Medications - No data to display    Assessments & Plan (with Medical Decision Making)     I have reviewed the nursing notes.    I have reviewed the findings, diagnosis, plan and need for follow up with the patient.   31-year-old female presents the urgent care with fatigue, headache, and slight scratchy throat that started yesterday.  See exam findings above.  Rapid strep obtained today and was negative.  Covid swab sent and currently pending.  No indication for antibiotics at this time.  Patient's vitals within normal limits and no significant abnormal findings on exam other than slightly erythematous posterior pharynx.  Suspect that this is likely viral in origin.  Symptomatic treatments discussed and given in discharge paperwork.  Patient discharged in stable condition with no concerns for Louie's angina or peritonsillar abscess.    New Prescriptions    No medications on file       Final diagnoses:   Throat pain   Encounter for laboratory testing for COVID-19 virus       9/23/2021   [facility] EMERGENCY DEPT     Ailin Zacarias PA-C  09/23/21 1127

## 2021-09-24 NOTE — RESULT ENCOUNTER NOTE
Group A Streptococcus PCR is NEGATIVE  No treatment or change in treatment [facility] ED lab result Strep Group A protocol.

## 2022-02-14 ENCOUNTER — APPOINTMENT (OUTPATIENT)
Dept: CT IMAGING | Facility: CLINIC | Age: 32
End: 2022-02-14
Attending: PHYSICIAN ASSISTANT
Payer: COMMERCIAL

## 2022-02-14 ENCOUNTER — HOSPITAL ENCOUNTER (EMERGENCY)
Facility: CLINIC | Age: 32
Discharge: HOME | End: 2022-02-15
Attending: PHYSICIAN ASSISTANT | Admitting: PHYSICIAN ASSISTANT
Payer: COMMERCIAL

## 2022-02-14 VITALS
OXYGEN SATURATION: 99 % | DIASTOLIC BLOOD PRESSURE: 73 MMHG | RESPIRATION RATE: 16 BRPM | BODY MASS INDEX: 45.32 KG/M2 | HEART RATE: 72 BPM | WEIGHT: 264 LBS | SYSTOLIC BLOOD PRESSURE: 107 MMHG | TEMPERATURE: 97.5 F

## 2022-02-14 DIAGNOSIS — M54.50 LEFT-SIDED LOW BACK PAIN WITHOUT SCIATICA: ICD-10-CM

## 2022-02-14 DIAGNOSIS — R10.32 LEFT LOWER QUADRANT ABDOMINAL PAIN: ICD-10-CM

## 2022-02-14 LAB
ALBUMIN SERPL-MCNC: 3.2 G/DL (ref 3.4–5)
ALBUMIN UR-MCNC: NEGATIVE MG/DL
ALP SERPL-CCNC: 111 U/L (ref 40–150)
ALT SERPL W P-5'-P-CCNC: 18 U/L (ref 0–50)
ANION GAP SERPL CALCULATED.3IONS-SCNC: 3 MMOL/L (ref 3–14)
APPEARANCE UR: CLEAR
AST SERPL W P-5'-P-CCNC: 9 U/L (ref 0–45)
BASOPHILS # BLD AUTO: 0.1 10E3/UL (ref 0–0.2)
BASOPHILS NFR BLD AUTO: 0 %
BILIRUB SERPL-MCNC: 0.2 MG/DL (ref 0.2–1.3)
BILIRUB UR QL STRIP: NEGATIVE
BUN SERPL-MCNC: 11 MG/DL (ref 7–30)
CALCIUM SERPL-MCNC: 9.3 MG/DL (ref 8.5–10.1)
CHLORIDE BLD-SCNC: 108 MMOL/L (ref 94–109)
CO2 SERPL-SCNC: 29 MMOL/L (ref 20–32)
COLOR UR AUTO: ABNORMAL
CREAT SERPL-MCNC: 0.69 MG/DL (ref 0.52–1.04)
EOSINOPHIL # BLD AUTO: 0.3 10E3/UL (ref 0–0.7)
EOSINOPHIL NFR BLD AUTO: 3 %
ERYTHROCYTE [DISTWIDTH] IN BLOOD BY AUTOMATED COUNT: 12.8 % (ref 10–15)
GFR SERPL CREATININE-BSD FRML MDRD: >90 ML/MIN/1.73M2
GLUCOSE BLD-MCNC: 113 MG/DL (ref 70–99)
GLUCOSE UR STRIP-MCNC: NEGATIVE MG/DL
HCG SERPL QL: NEGATIVE
HCT VFR BLD AUTO: 41.2 % (ref 35–47)
HGB BLD-MCNC: 14 G/DL (ref 11.7–15.7)
HGB UR QL STRIP: NEGATIVE
HOLD SPECIMEN: NORMAL
IMM GRANULOCYTES # BLD: 0 10E3/UL
IMM GRANULOCYTES NFR BLD: 0 %
KETONES UR STRIP-MCNC: NEGATIVE MG/DL
LEUKOCYTE ESTERASE UR QL STRIP: NEGATIVE
LIPASE SERPL-CCNC: 75 U/L (ref 73–393)
LYMPHOCYTES # BLD AUTO: 3.6 10E3/UL (ref 0.8–5.3)
LYMPHOCYTES NFR BLD AUTO: 28 %
MCH RBC QN AUTO: 29 PG (ref 26.5–33)
MCHC RBC AUTO-ENTMCNC: 34 G/DL (ref 31.5–36.5)
MCV RBC AUTO: 86 FL (ref 78–100)
MONOCYTES # BLD AUTO: 0.8 10E3/UL (ref 0–1.3)
MONOCYTES NFR BLD AUTO: 7 %
MUCOUS THREADS #/AREA URNS LPF: PRESENT /LPF
NEUTROPHILS # BLD AUTO: 7.8 10E3/UL (ref 1.6–8.3)
NEUTROPHILS NFR BLD AUTO: 62 %
NITRATE UR QL: NEGATIVE
NRBC # BLD AUTO: 0 10E3/UL
NRBC BLD AUTO-RTO: 0 /100
PH UR STRIP: 6.5 [PH] (ref 5–7)
PLATELET # BLD AUTO: 317 10E3/UL (ref 150–450)
POTASSIUM BLD-SCNC: 3.7 MMOL/L (ref 3.4–5.3)
PROT SERPL-MCNC: 7.5 G/DL (ref 6.8–8.8)
RBC # BLD AUTO: 4.82 10E6/UL (ref 3.8–5.2)
RBC URINE: 0 /HPF
SODIUM SERPL-SCNC: 140 MMOL/L (ref 133–144)
SP GR UR STRIP: 1.01 (ref 1–1.03)
SQUAMOUS EPITHELIAL: 2 /HPF
UROBILINOGEN UR STRIP-MCNC: NORMAL MG/DL
WBC # BLD AUTO: 12.6 10E3/UL (ref 4–11)
WBC URINE: <1 /HPF

## 2022-02-14 PROCEDURE — 99285 EMERGENCY DEPT VISIT HI MDM: CPT | Mod: 25 | Performed by: EMERGENCY MEDICINE

## 2022-02-14 PROCEDURE — 96361 HYDRATE IV INFUSION ADD-ON: CPT | Performed by: EMERGENCY MEDICINE

## 2022-02-14 PROCEDURE — 85004 AUTOMATED DIFF WBC COUNT: CPT | Performed by: PHYSICIAN ASSISTANT

## 2022-02-14 PROCEDURE — 96375 TX/PRO/DX INJ NEW DRUG ADDON: CPT | Performed by: EMERGENCY MEDICINE

## 2022-02-14 PROCEDURE — 36415 COLL VENOUS BLD VENIPUNCTURE: CPT | Performed by: PHYSICIAN ASSISTANT

## 2022-02-14 PROCEDURE — 83690 ASSAY OF LIPASE: CPT | Performed by: PHYSICIAN ASSISTANT

## 2022-02-14 PROCEDURE — 81001 URINALYSIS AUTO W/SCOPE: CPT | Performed by: PHYSICIAN ASSISTANT

## 2022-02-14 PROCEDURE — 250N000011 HC RX IP 250 OP 636: Performed by: PHYSICIAN ASSISTANT

## 2022-02-14 PROCEDURE — 96374 THER/PROPH/DIAG INJ IV PUSH: CPT | Mod: 59 | Performed by: EMERGENCY MEDICINE

## 2022-02-14 PROCEDURE — 250N000009 HC RX 250: Performed by: PHYSICIAN ASSISTANT

## 2022-02-14 PROCEDURE — 80053 COMPREHEN METABOLIC PANEL: CPT | Performed by: PHYSICIAN ASSISTANT

## 2022-02-14 PROCEDURE — 99285 EMERGENCY DEPT VISIT HI MDM: CPT | Mod: FS | Performed by: EMERGENCY MEDICINE

## 2022-02-14 PROCEDURE — 258N000003 HC RX IP 258 OP 636: Performed by: PHYSICIAN ASSISTANT

## 2022-02-14 PROCEDURE — 84703 CHORIONIC GONADOTROPIN ASSAY: CPT | Performed by: PHYSICIAN ASSISTANT

## 2022-02-14 PROCEDURE — 96376 TX/PRO/DX INJ SAME DRUG ADON: CPT | Performed by: EMERGENCY MEDICINE

## 2022-02-14 PROCEDURE — 82040 ASSAY OF SERUM ALBUMIN: CPT | Performed by: PHYSICIAN ASSISTANT

## 2022-02-14 PROCEDURE — 74177 CT ABD & PELVIS W/CONTRAST: CPT

## 2022-02-14 RX ORDER — HYDROMORPHONE HYDROCHLORIDE 1 MG/ML
0.5 INJECTION, SOLUTION INTRAMUSCULAR; INTRAVENOUS; SUBCUTANEOUS
Status: COMPLETED | OUTPATIENT
Start: 2022-02-14 | End: 2022-02-14

## 2022-02-14 RX ORDER — ONDANSETRON 2 MG/ML
4 INJECTION INTRAMUSCULAR; INTRAVENOUS EVERY 30 MIN PRN
Status: DISCONTINUED | OUTPATIENT
Start: 2022-02-14 | End: 2022-02-15 | Stop reason: HOSPADM

## 2022-02-14 RX ORDER — IOPAMIDOL 755 MG/ML
100 INJECTION, SOLUTION INTRAVASCULAR ONCE
Status: COMPLETED | OUTPATIENT
Start: 2022-02-14 | End: 2022-02-14

## 2022-02-14 RX ADMIN — IOPAMIDOL 100 ML: 755 INJECTION, SOLUTION INTRAVENOUS at 19:02

## 2022-02-14 RX ADMIN — SODIUM CHLORIDE 1000 ML: 9 INJECTION, SOLUTION INTRAVENOUS at 17:45

## 2022-02-14 RX ADMIN — HYDROMORPHONE HYDROCHLORIDE 0.5 MG: 1 INJECTION, SOLUTION INTRAMUSCULAR; INTRAVENOUS; SUBCUTANEOUS at 22:45

## 2022-02-14 RX ADMIN — SODIUM CHLORIDE 72 ML: 9 INJECTION, SOLUTION INTRAVENOUS at 19:02

## 2022-02-14 RX ADMIN — HYDROMORPHONE HYDROCHLORIDE 0.5 MG: 1 INJECTION, SOLUTION INTRAMUSCULAR; INTRAVENOUS; SUBCUTANEOUS at 17:51

## 2022-02-14 RX ADMIN — ONDANSETRON 4 MG: 2 INJECTION INTRAMUSCULAR; INTRAVENOUS at 17:51

## 2022-02-14 ASSESSMENT — ENCOUNTER SYMPTOMS
RESPIRATORY NEGATIVE: 1
RECTAL PAIN: 0
VOMITING: 0
CONSTIPATION: 1
NEUROLOGICAL NEGATIVE: 1
CONSTITUTIONAL NEGATIVE: 1
ABDOMINAL DISTENTION: 1
ANAL BLEEDING: 0
ABDOMINAL PAIN: 1
CARDIOVASCULAR NEGATIVE: 1
BLOOD IN STOOL: 0
DIARRHEA: 0
MUSCULOSKELETAL NEGATIVE: 1
NAUSEA: 1
EYES NEGATIVE: 1

## 2022-02-14 NOTE — ED PROVIDER NOTES
History     Chief Complaint   Patient presents with     Abdominal Pain     LLQ     Back Pain     low     HPI  Huong Meredith is a 31 year old female with a past medical history of diverticulosis (one previous episode of diverticulitis in May 2021), internal hemorrhoids, history of colonic polyps, and depressive disorder who presents with left lower quadrant abdominal pain and low back pain which began 10 days ago.  Also has diffuse low back pain which she rates as 1/10.  Feels like her back is bruised, especially felt when she moves in flexion/extension and twisting.  Associated symptoms include nausea but no vomiting.  She denies any rectal bleeding or diarrhea.  States that she has had some hard stools over the past few days to weeks.  She is not using anything for birth control currently, had the Mirena removed.  Last menstrual period was in late December 2021, but she has tested negative for pregnancy on multiple occasions over the past 2 months.  No URI symptoms today, no chest pain or shortness of breath, no joint pain or rashes, no lower extremity swelling.  No previous abdominal surgeries, no C-sections.  She was previously treated with antibiotics when having diverticulitis in May 2021.     Allergies:  Allergies   Allergen Reactions     Buspirone Rash     Calamine Rash     Nickel Chloride  [Nickel] Rash       Problem List:    Patient Active Problem List    Diagnosis Date Noted     Depressive disorder 04/02/2021     Priority: Medium     Anxiety 06/13/2018     Priority: Medium     Diverticulosis of large intestine 06/13/2018     Priority: Medium     Hemorrhoids, internal 06/13/2018     Priority: Medium     History of colonic polyps 06/13/2018     Priority: Medium     Morbid obesity with BMI of 40.0-44.9, adult (H) 01/28/2016     Priority: Medium        Past Medical History:    No past medical history on file.    Past Surgical History:    No past surgical history on file.    Family History:    No family  history on file.    Social History:  Marital Status:  Single [1]  Social History     Tobacco Use     Smoking status: Never Smoker     Smokeless tobacco: Never Used   Substance Use Topics     Alcohol use: Not on file     Drug use: Not on file        Medications:    ondansetron (ZOFRAN-ODT) 4 MG ODT tab  oxyCODONE (ROXICODONE) 5 MG tablet  levonorgestrel (MIRENA) 20 MCG/24HR IUD          Review of Systems   Constitutional: Negative.    HENT: Negative.    Eyes: Negative.    Respiratory: Negative.    Cardiovascular: Negative.    Gastrointestinal: Positive for abdominal distention, abdominal pain, constipation and nausea. Negative for anal bleeding, blood in stool, diarrhea, rectal pain and vomiting.   Genitourinary: Negative.    Musculoskeletal: Negative.    Skin: Negative.    Neurological: Negative.        Physical Exam   BP: 124/82  Pulse: 91  Temp: 97.5  F (36.4  C)  Resp: 16  Weight: 119.7 kg (264 lb)  SpO2: 100 %      Physical Exam  Constitutional:       General: She is not in acute distress.     Appearance: Normal appearance. She is not ill-appearing, toxic-appearing or diaphoretic.   HENT:      Head: Normocephalic and atraumatic.      Right Ear: No middle ear effusion. No mastoid tenderness. Tympanic membrane is not injected, perforated, erythematous, retracted or bulging.      Left Ear:  No middle ear effusion. No mastoid tenderness. Tympanic membrane is not injected, perforated, erythematous, retracted or bulging.      Mouth/Throat:      Mouth: Mucous membranes are moist.      Pharynx: Oropharynx is clear. No oropharyngeal exudate or posterior oropharyngeal erythema.   Eyes:      General: No scleral icterus.        Right eye: No discharge.         Left eye: No discharge.      Extraocular Movements: Extraocular movements intact.      Conjunctiva/sclera: Conjunctivae normal.   Cardiovascular:      Rate and Rhythm: Normal rate and regular rhythm.      Pulses: Normal pulses.      Heart sounds: Normal heart sounds.  No murmur heard.      Pulmonary:      Effort: Pulmonary effort is normal. No respiratory distress.      Breath sounds: Normal breath sounds. No stridor. No wheezing or rhonchi.   Abdominal:      General: Abdomen is flat. Bowel sounds are normal. There is distension (mild distension).      Palpations: Abdomen is soft. There is no shifting dullness or fluid wave.      Tenderness: There is abdominal tenderness in the left lower quadrant. There is no right CVA tenderness, left CVA tenderness, guarding or rebound.      Hernia: No hernia is present.      Comments: Mild tenderness in the left lower quadrant.   Musculoskeletal:         General: Normal range of motion.      Cervical back: Normal and neck supple. No rigidity. No muscular tenderness.      Thoracic back: Normal.      Lumbar back: Tenderness present. No swelling, edema, deformity, signs of trauma, lacerations, spasms or bony tenderness. Normal range of motion. Negative right straight leg raise test and negative left straight leg raise test. No scoliosis.        Back:       Right lower leg: No edema.      Left lower leg: No edema.      Comments: Mild tenderness over the paraspinal muscles and lumbar spine at the level of L3-S1.  Strength is 5/5 in both lower extremities.   Lymphadenopathy:      Cervical: No cervical adenopathy.      Right cervical: No superficial, deep or posterior cervical adenopathy.     Left cervical: No superficial, deep or posterior cervical adenopathy.   Skin:     General: Skin is warm and dry.   Neurological:      General: No focal deficit present.      Mental Status: She is alert and oriented to person, place, and time.      Sensory: No sensory deficit.      Motor: No weakness.      Coordination: Coordination normal.   Psychiatric:         Mood and Affect: Mood normal.         Behavior: Behavior normal.         Thought Content: Thought content normal.         Judgment: Judgment normal.         ED Course              ED Course as of  02/15/22 0012 Mon Feb 14, 2022   2251 Glucose Urine: Negative     Procedures        Results for orders placed or performed during the hospital encounter of 02/14/22 (from the past 24 hour(s))   CBC with platelets differential    Narrative    The following orders were created for panel order CBC with platelets differential.  Procedure                               Abnormality         Status                     ---------                               -----------         ------                     CBC with platelets and d...[[id number]]  Abnormal            Final result                 Please view results for these tests on the individual orders.   Comprehensive metabolic panel   Result Value Ref Range    Sodium 140 133 - 144 mmol/L    Potassium 3.7 3.4 - 5.3 mmol/L    Chloride 108 94 - 109 mmol/L    Carbon Dioxide (CO2) 29 20 - 32 mmol/L    Anion Gap 3 3 - 14 mmol/L    Urea Nitrogen 11 7 - 30 mg/dL    Creatinine 0.69 0.52 - 1.04 mg/dL    Calcium 9.3 8.5 - 10.1 mg/dL    Glucose 113 (H) 70 - 99 mg/dL    Alkaline Phosphatase 111 40 - 150 U/L    AST 9 0 - 45 U/L    ALT 18 0 - 50 U/L    Protein Total 7.5 6.8 - 8.8 g/dL    Albumin 3.2 (L) 3.4 - 5.0 g/dL    Bilirubin Total 0.2 0.2 - 1.3 mg/dL    GFR Estimate >90 >60 mL/min/1.73m2   Lipase   Result Value Ref Range    Lipase 75 73 - 393 U/L   HCG qualitative Blood   Result Value Ref Range    hCG Serum Qualitative Negative Negative   [place] Draw    Narrative    The following orders were created for panel order [place] Draw.  Procedure                               Abnormality         Status                     ---------                               -----------         ------                     Extra Blue Top Tube[[id number]]                              Final result                 Please view results for these tests on the individual orders.   CBC with platelets and differential   Result Value Ref Range    WBC Count 12.6 (H) 4.0 - 11.0 10e3/uL    RBC Count 4.82 3.80 - 5.20  10e6/uL    Hemoglobin 14.0 11.7 - 15.7 g/dL    Hematocrit 41.2 35.0 - 47.0 %    MCV 86 78 - 100 fL    MCH 29.0 26.5 - 33.0 pg    MCHC 34.0 31.5 - 36.5 g/dL    RDW 12.8 10.0 - 15.0 %    Platelet Count 317 150 - 450 10e3/uL    % Neutrophils 62 %    % Lymphocytes 28 %    % Monocytes 7 %    % Eosinophils 3 %    % Basophils 0 %    % Immature Granulocytes 0 %    NRBCs per 100 WBC 0 <1 /100    Absolute Neutrophils 7.8 1.6 - 8.3 10e3/uL    Absolute Lymphocytes 3.6 0.8 - 5.3 10e3/uL    Absolute Monocytes 0.8 0.0 - 1.3 10e3/uL    Absolute Eosinophils 0.3 0.0 - 0.7 10e3/uL    Absolute Basophils 0.1 0.0 - 0.2 10e3/uL    Absolute Immature Granulocytes 0.0 <=0.4 10e3/uL    Absolute NRBCs 0.0 10e3/uL   Extra Blue Top Tube   Result Value Ref Range    Hold Specimen JIC    UA with Microscopic reflex to Culture    Specimen: Urine, Clean Catch   Result Value Ref Range    Color Urine Straw Colorless, Straw, Light Yellow, Yellow    Appearance Urine Clear Clear    Glucose Urine Negative Negative mg/dL    Bilirubin Urine Negative Negative    Ketones Urine Negative Negative mg/dL    Specific Gravity Urine 1.010 1.003 - 1.035    Blood Urine Negative Negative    pH Urine 6.5 5.0 - 7.0    Protein Albumin Urine Negative Negative mg/dL    Urobilinogen Urine Normal Normal, 2.0 mg/dL    Nitrite Urine Negative Negative    Leukocyte Esterase Urine Negative Negative    Mucus Urine Present (A) None Seen /LPF    RBC Urine 0 <=2 /HPF    WBC Urine <1 <=5 /HPF    Squamous Epithelials Urine 2 (H) <=1 /HPF    Narrative    Urine Culture not indicated   CT Abdomen Pelvis w Contrast    Narrative    EXAM: CT ABDOMEN PELVIS W CONTRAST  LOCATION: [facility]  DATE/TIME: 2/14/2022 7:01 PM    INDICATION: Lower back pain and nausea. Left lower quadrant pain.  COMPARISON: 05/28/2021  TECHNIQUE: CT scan of the abdomen and pelvis was performed following injection of IV contrast. Multiplanar reformats were obtained. Dose reduction techniques  were used.  CONTRAST: 100mL Isovue 370    FINDINGS:   LOWER CHEST: Lung bases clear.    HEPATOBILIARY: Normal.    PANCREAS: Normal.    SPLEEN: Normal.    ADRENAL GLANDS: Normal.    KIDNEYS/BLADDER: Normal.    BOWEL: Normal caliber. Diverticulosis. Normal appendix.    LYMPH NODES: Normal.    VASCULATURE: Unremarkable.    PELVIC ORGANS: Normal.    MUSCULOSKELETAL: Normal.      Impression    IMPRESSION:   1.  No inflammatory change, bowel obstruction or abscess. Normal appendix.  2.  Diverticulosis.       Medications   ondansetron (ZOFRAN) injection 4 mg (4 mg Intravenous Given 2/14/22 1751)   0.9% sodium chloride BOLUS (0 mLs Intravenous Stopped 2/14/22 2056)   HYDROmorphone (PF) (DILAUDID) injection 0.5 mg (0.5 mg Intravenous Given 2/14/22 1751)   iopamidol (ISOVUE-370) solution 100 mL (100 mLs Intravenous Given 2/14/22 1902)   sodium chloride 0.9 % bag 500mL for CT scan flush use (72 mLs Intravenous Given 2/14/22 1902)   HYDROmorphone (PF) (DILAUDID) injection 0.5 mg (0.5 mg Intravenous Given 2/14/22 2245)       Assessments & Plan (with Medical Decision Making)   The patient is a 31 year old female with a past medical history of diverticulosis (one previous episode of diverticulitis in May 2021), internal hemorrhoids, history of colonic polyps, and depressive disorder who presents with left lower quadrant abdominal pain and low back pain which began 10 days ago.  Also has diffuse low back pain which she rates as 1/10.    Appropriate included urinalysis, CMP, lipase, CBC, and hCG qualitative blood urine test.  WBCs were elevated at 12.6.  All other labs were grossly normal.  No evidence of acute infection on physical exam today.    CT scan showed evidence for diverticulosis but no other acute findings.    Cause of left lower quadrant abdominal pain is unknown today.  The patient has had similar pain in the past when having diverticulitis.  Has mild discomfort on palpation of the left lower quadrant, mild pain is  reassuring for no acute ideology today. She has a history of polyps but has not had a colonoscopy over the past 2 years.  Recommend follow-up with PCP or OB/GYN for further evaluation and management.  The patient agrees and is willing to follow-up with her OB/GYN in the next few days.    Provided the patient with oxycodone for breakthrough pain.  Provided Zofran for symptomatic relief of nausea.  Recommend ibuprofen and Tylenol as needed for symptomatic relief of pain currently.    Provided the patient with a handout discussing diet for diverticular disease.    Return to clinic for further evaluation if you develop fever of 102 degrees F or greater, chest pain, difficulty breathing, shortness of breath, difficulty swallowing, severe headache, numbness or tingling, dizziness or lightheadedness, acute vision changes, acutely worsening rash, or worsening abdominal pain.    I have reviewed the nursing notes.    I have reviewed the findings, diagnosis, plan and need for follow up with the patient.      New Prescriptions    ONDANSETRON (ZOFRAN-ODT) 4 MG ODT TAB    Take 1 tablet (4 mg) by mouth every 8 hours as needed for nausea    OXYCODONE (ROXICODONE) 5 MG TABLET    Take 1 tablet (5 mg) by mouth every 6 hours as needed for severe pain       Final diagnoses:   Left lower quadrant abdominal pain   Left-sided low back pain without sciatica       2/14/2022   [facility] EMERGENCY DEPT     Viktor Villegas PA-C  02/15/22 0013

## 2022-02-15 RX ORDER — OXYCODONE HYDROCHLORIDE 5 MG/1
5 TABLET ORAL EVERY 6 HOURS PRN
Qty: 10 TABLET | Refills: 0 | Status: SHIPPED | OUTPATIENT
Start: 2022-02-15 | End: 2023-01-30

## 2022-02-15 RX ORDER — ONDANSETRON 4 MG/1
4 TABLET, ORALLY DISINTEGRATING ORAL EVERY 8 HOURS PRN
Qty: 10 TABLET | Refills: 0 | Status: SHIPPED | OUTPATIENT
Start: 2022-02-15 | End: 2023-01-30

## 2022-02-15 NOTE — DISCHARGE INSTRUCTIONS
Recommend follow-up with PCP or OB/GYN for further evaluation and management.     Return to clinic for further evaluation if you develop fever of 102 degrees F or greater, chest pain, difficulty breathing, shortness of breath, difficulty swallowing, severe headache, numbness or tingling, dizziness or lightheadedness, acute vision changes, acutely worsening rash, or worsening abdominal pain.

## 2022-02-15 NOTE — ED PROVIDER NOTES
Physician Attestation     I, Calvin Roberts, saw and evaluated Huong Meredith as part of a shared APRN/PA visit.     I personally reviewed the vital signs, medications, labs and imaging.    I personally performed the substantive portion of the medical decision making for this visit - please see the [facility]'s documentation for full details.      On my examination she had mild left lower quadrant tenderness to palpation with no peritoneal signs.  No suprapubic tenderness.  No flank tenderness.  Mild paraspinal muscular tenderness at level of lumbar spine.  No CVA tenderness.  Labs with mild leukocytosis but no left shift.  No fevers, chills, tachycardia or constitutional symptoms to suggest underlying infectious etiology.  Urine is bland.  I reviewed CT images personally.  No signs of acute diverticulitis.  No ovarian cysts appreciated.  No signs of urolithiasis or hydronephrosis.  It is possible that her symptoms are due to diver particular disease which she has had before but given her reassuring evaluation I do not feel that empiric antibiotic treatment warranted.  Patient has not had menstrual cycle in past 2 months which is unusual for her.  UPT negative.  Did not appreciate any obvious ovarian pathology on CT scan.  It is possible for her symptoms to be uterine in nature.  She plans to contact her gynecologist tomorrow to schedule follow-up.  She is also has not been able to obtain a colonoscopy due to the pandemic.  She is currently in process of trying to have that rescheduled.  Strict ED return precautions discussed.  She expressed good understanding of plan and was discharged in stable condition.  Prescription for oxycodone sent to [facility].    Calvin Roberts  Date of Service (when I saw the patient): 02/15/22       Calvin Roberts MD  02/15/22 0106

## 2022-06-25 ENCOUNTER — HEALTH MAINTENANCE LETTER (OUTPATIENT)
Age: 32
End: 2022-06-25

## 2022-11-20 ENCOUNTER — HEALTH MAINTENANCE LETTER (OUTPATIENT)
Age: 32
End: 2022-11-20

## 2023-01-16 ENCOUNTER — HOSPITAL ENCOUNTER (EMERGENCY)
Facility: CLINIC | Age: 33
Discharge: HOME | End: 2023-01-16
Attending: EMERGENCY MEDICINE | Admitting: EMERGENCY MEDICINE
Payer: COMMERCIAL

## 2023-01-16 VITALS
RESPIRATION RATE: 18 BRPM | WEIGHT: 274 LBS | HEART RATE: 78 BPM | DIASTOLIC BLOOD PRESSURE: 69 MMHG | TEMPERATURE: 97.8 F | OXYGEN SATURATION: 99 % | SYSTOLIC BLOOD PRESSURE: 110 MMHG | BODY MASS INDEX: 46.78 KG/M2 | HEIGHT: 64 IN

## 2023-01-16 DIAGNOSIS — R20.0 BILATERAL HAND NUMBNESS: ICD-10-CM

## 2023-01-16 LAB
ALBUMIN SERPL BCG-MCNC: 3.6 G/DL (ref 3.5–5.2)
ALP SERPL-CCNC: 105 U/L (ref 35–104)
ALT SERPL W P-5'-P-CCNC: 11 U/L (ref 10–35)
ANION GAP SERPL CALCULATED.3IONS-SCNC: 8 MMOL/L (ref 7–15)
AST SERPL W P-5'-P-CCNC: 14 U/L (ref 10–35)
BASOPHILS # BLD AUTO: 0 10E3/UL (ref 0–0.2)
BASOPHILS NFR BLD AUTO: 0 %
BILIRUB SERPL-MCNC: 0.2 MG/DL
BUN SERPL-MCNC: 12 MG/DL (ref 6–20)
CALCIUM SERPL-MCNC: 9.1 MG/DL (ref 8.6–10)
CHLORIDE SERPL-SCNC: 101 MMOL/L (ref 98–107)
CREAT SERPL-MCNC: 0.68 MG/DL (ref 0.51–0.95)
DEPRECATED HCO3 PLAS-SCNC: 25 MMOL/L (ref 22–29)
EOSINOPHIL # BLD AUTO: 0.3 10E3/UL (ref 0–0.7)
EOSINOPHIL NFR BLD AUTO: 2 %
ERYTHROCYTE [DISTWIDTH] IN BLOOD BY AUTOMATED COUNT: 12.8 % (ref 10–15)
GFR SERPL CREATININE-BSD FRML MDRD: >90 ML/MIN/1.73M2
GLUCOSE SERPL-MCNC: 119 MG/DL (ref 70–99)
HCT VFR BLD AUTO: 37.5 % (ref 35–47)
HGB BLD-MCNC: 12.6 G/DL (ref 11.7–15.7)
HOLD SPECIMEN: NORMAL
HOLD SPECIMEN: NORMAL
IMM GRANULOCYTES # BLD: 0.1 10E3/UL
IMM GRANULOCYTES NFR BLD: 0 %
LYMPHOCYTES # BLD AUTO: 4 10E3/UL (ref 0.8–5.3)
LYMPHOCYTES NFR BLD AUTO: 27 %
MAGNESIUM SERPL-MCNC: 1.9 MG/DL (ref 1.7–2.3)
MCH RBC QN AUTO: 28.9 PG (ref 26.5–33)
MCHC RBC AUTO-ENTMCNC: 33.6 G/DL (ref 31.5–36.5)
MCV RBC AUTO: 86 FL (ref 78–100)
MONOCYTES # BLD AUTO: 0.7 10E3/UL (ref 0–1.3)
MONOCYTES NFR BLD AUTO: 5 %
NEUTROPHILS # BLD AUTO: 9.8 10E3/UL (ref 1.6–8.3)
NEUTROPHILS NFR BLD AUTO: 66 %
NRBC # BLD AUTO: 0 10E3/UL
NRBC BLD AUTO-RTO: 0 /100
PLATELET # BLD AUTO: 287 10E3/UL (ref 150–450)
POTASSIUM SERPL-SCNC: 3.7 MMOL/L (ref 3.4–5.3)
PROT SERPL-MCNC: 7.1 G/DL (ref 6.4–8.3)
RBC # BLD AUTO: 4.36 10E6/UL (ref 3.8–5.2)
SODIUM SERPL-SCNC: 134 MMOL/L (ref 136–145)
WBC # BLD AUTO: 14.8 10E3/UL (ref 4–11)

## 2023-01-16 PROCEDURE — 85025 COMPLETE CBC W/AUTO DIFF WBC: CPT | Performed by: EMERGENCY MEDICINE

## 2023-01-16 PROCEDURE — 83735 ASSAY OF MAGNESIUM: CPT | Performed by: EMERGENCY MEDICINE

## 2023-01-16 PROCEDURE — 80053 COMPREHEN METABOLIC PANEL: CPT | Performed by: EMERGENCY MEDICINE

## 2023-01-16 PROCEDURE — 36415 COLL VENOUS BLD VENIPUNCTURE: CPT | Performed by: EMERGENCY MEDICINE

## 2023-01-16 PROCEDURE — 99283 EMERGENCY DEPT VISIT LOW MDM: CPT | Performed by: EMERGENCY MEDICINE

## 2023-01-16 PROCEDURE — 99284 EMERGENCY DEPT VISIT MOD MDM: CPT | Performed by: EMERGENCY MEDICINE

## 2023-01-16 ASSESSMENT — ENCOUNTER SYMPTOMS
FEVER: 0
SHORTNESS OF BREATH: 0
ABDOMINAL PAIN: 0
NUMBNESS: 1

## 2023-01-16 ASSESSMENT — ACTIVITIES OF DAILY LIVING (ADL): ADLS_ACUITY_SCORE: 35

## 2023-01-16 NOTE — ED PROVIDER NOTES
History     Chief Complaint   Patient presents with     Numbness     HPI  Huong Meredith is a 32 year old female who has past medical history significant for anxiety, depression, and currently 6 weeks pregnant, presenting the emergency department with concerns regarding bilateral hand and finger numbness.  She states she was otherwise in her normal state of health throughout the afternoon, and evening hours.  This evening, she took a shower, and shortly thereafter noted that she had numbness, and tingling, primarily of digits 4 and 5 of the bilateral upper extremities.  Did have also slight numbness of the remaining digits of the bilateral hands.  Did have slight pressure-like sensation of the left scapular area.  Denies any chest pain.  No shortness of breath.  Denies any worsening anxiety, however was feeling anxious when she began experiencing the symptoms of numbness.  No daily medication use other than multivitamins.  Has otherwise been eating, drinking, and voiding, and stooling normally.  No fevers.     Allergies:  Allergies   Allergen Reactions     Buspirone Rash     Calamine Rash     Nickel Chloride  [Nickel] Rash       Problem List:    Patient Active Problem List    Diagnosis Date Noted     Depressive disorder 04/02/2021     Priority: Medium     Anxiety 06/13/2018     Priority: Medium     Diverticulosis of large intestine 06/13/2018     Priority: Medium     Hemorrhoids, internal 06/13/2018     Priority: Medium     History of colonic polyps 06/13/2018     Priority: Medium     Morbid obesity with BMI of 40.0-44.9, adult (H) 01/28/2016     Priority: Medium        Past Medical History:    No past medical history on file.    Past Surgical History:    No past surgical history on file.    Family History:    No family history on file.    Social History:  Marital Status:  Single [1]  Social History     Tobacco Use     Smoking status: Never     Smokeless tobacco: Never        Medications:    levonorgestrel  "(MIRENA) 20 MCG/24HR IUD  ondansetron (ZOFRAN-ODT) 4 MG ODT tab  oxyCODONE (ROXICODONE) 5 MG tablet          Review of Systems   Constitutional: Negative for fever.   Respiratory: Negative for shortness of breath.    Cardiovascular: Negative for chest pain.   Gastrointestinal: Negative for abdominal pain.   Neurological: Positive for numbness.   All other systems reviewed and are negative.      Physical Exam   BP: (!) 131/92  Pulse: 85  Temp: 97.8  F (36.6  C)  Resp: 18  Height: 162.6 cm (5' 4\")  Weight: 124.3 kg (274 lb)  SpO2: 100 %      Physical Exam  /62   Pulse 75   Temp 97.8  F (36.6  C) (Oral)   Resp 18   Ht 1.626 m (5' 4\")   Wt 124.3 kg (274 lb)   SpO2 98%   BMI 47.03 kg/m    General: alert and in no acute distress  Head: atraumatic, normocephalic  Abd: nondistended  Musculoskel/Extremities: normal extremities, no apparent edema, and full AROM of major joints  Skin: no rashes, no diaphoresis and skin color normal  Neuro: Patient awake, alert, oriented, speech is fluent, gait is normal.  Subjective numbness of BUE.  Psychiatric: affect/mood normal, cooperative, normal judgement/insight and memory intact      ED Course                 Procedures              Critical Care time:  none               Results for orders placed or performed during the hospital encounter of 01/16/23 (from the past 24 hour(s))   CBC with platelets differential    Narrative    The following orders were created for panel order CBC with platelets differential.  Procedure                               Abnormality         Status                     ---------                               -----------         ------                     CBC with platelets and d...[[id number]]  Abnormal            Final result                 Please view results for these tests on the individual orders.   Comprehensive metabolic panel   Result Value Ref Range    Sodium 134 (L) 136 - 145 mmol/L    Potassium 3.7 3.4 - 5.3 mmol/L    Chloride 101 98 - " 107 mmol/L    Carbon Dioxide (CO2) 25 22 - 29 mmol/L    Anion Gap 8 7 - 15 mmol/L    Urea Nitrogen 12.0 6.0 - 20.0 mg/dL    Creatinine 0.68 0.51 - 0.95 mg/dL    Calcium 9.1 8.6 - 10.0 mg/dL    Glucose 119 (H) 70 - 99 mg/dL    Alkaline Phosphatase 105 (H) 35 - 104 U/L    AST 14 10 - 35 U/L    ALT 11 10 - 35 U/L    Protein Total 7.1 6.4 - 8.3 g/dL    Albumin 3.6 3.5 - 5.2 g/dL    Bilirubin Total 0.2 <=1.2 mg/dL    GFR Estimate >90 >60 mL/min/1.73m2   Magnesium   Result Value Ref Range    Magnesium 1.9 1.7 - 2.3 mg/dL   CBC with platelets and differential   Result Value Ref Range    WBC Count 14.8 (H) 4.0 - 11.0 10e3/uL    RBC Count 4.36 3.80 - 5.20 10e6/uL    Hemoglobin 12.6 11.7 - 15.7 g/dL    Hematocrit 37.5 35.0 - 47.0 %    MCV 86 78 - 100 fL    MCH 28.9 26.5 - 33.0 pg    MCHC 33.6 31.5 - 36.5 g/dL    RDW 12.8 10.0 - 15.0 %    Platelet Count 287 150 - 450 10e3/uL    % Neutrophils 66 %    % Lymphocytes 27 %    % Monocytes 5 %    % Eosinophils 2 %    % Basophils 0 %    % Immature Granulocytes 0 %    NRBCs per 100 WBC 0 <1 /100    Absolute Neutrophils 9.8 (H) 1.6 - 8.3 10e3/uL    Absolute Lymphocytes 4.0 0.8 - 5.3 10e3/uL    Absolute Monocytes 0.7 0.0 - 1.3 10e3/uL    Absolute Eosinophils 0.3 0.0 - 0.7 10e3/uL    Absolute Basophils 0.0 0.0 - 0.2 10e3/uL    Absolute Immature Granulocytes 0.1 <=0.4 10e3/uL    Absolute NRBCs 0.0 10e3/uL   [place] Draw    Narrative    The following orders were created for panel order [place] Draw.  Procedure                               Abnormality         Status                     ---------                               -----------         ------                     Extra Blue Top Tube[[id number]]                              In process                 Extra Red Top Tube[[id number]]                               In process                   Please view results for these tests on the individual orders.       Medications - No data to display    Assessments & Plan (with Medical Decision  Making)  32 year old female, 6 weeks pregnant, presenting with bilateral hand numbness.  This began about 2 hours prior to ED arrival, primarily involving the ulnar nerve distribution of the bilateral upper extremities.  No numbness elsewhere in the arms.  Does have slight numbness of the remaining digits of the bilateral upper extremities.    Patient also with focal area of subjective numbness in the elbow region.  Her physical exam is normal.  No focal neurologic deficits.  Normal upper extremity pulses bilaterally, with normal sensation bilaterally.  At this point, uncertain exact cause of her symptoms.  This would suggest more of a peripheral nerve related issue contributing to the numbness in primarily in ulnar distribution, however atypical in the sense that she has bilateral involvement.  Does not involve circumferential aspects of the hands/arms.    Laboratory work-up shows no evidence of electrolyte abnormality that would be contributing.  At this point, uncertain exact cause.  May have anxiety component.  She is pregnant, and therefore risks of CT scan outweigh any sort of benefits.  Will discharge home, and follow-up in clinic if ongoing symptoms.     I have reviewed the nursing notes.    I have reviewed the findings, diagnosis, plan and need for follow up with the patient.             New Prescriptions    No medications on file       Final diagnoses:   Bilateral hand numbness       1/16/2023   [facility] EMERGENCY DEPT     Geovani Meza MD  01/16/23 0151

## 2023-01-16 NOTE — ED TRIAGE NOTES
Pt reports she took a shower and when she got out of shower bilateral arms were numb and tingling and states it feels like someone is pushing on the back of her shoulders.     Triage Assessment     Row Name 01/16/23 0028       Triage Assessment (Adult)    Airway WDL WDL       Respiratory WDL    Respiratory WDL WDL       Skin Circulation/Temperature WDL    Skin Circulation/Temperature WDL WDL       Cardiac WDL    Cardiac WDL WDL       Peripheral/Neurovascular WDL    Peripheral Neurovascular WDL neurovascular assessment upper       LUE Neurovascular Assessment    Temperature LUE warm    Color LUE no discoloration    Sensation LUE numbness present;tingling present       RUE Neurovascular Assessment    Temperature RUE warm    Color RUE no discoloration    Sensation RUE numbness present;tingling present       Cognitive/Neuro/Behavioral WDL    Cognitive/Neuro/Behavioral WDL WDL

## 2023-01-16 NOTE — DISCHARGE INSTRUCTIONS
Uncertain exact cause of your numbness.  Would recommend follow-up in clinic this week if ongoing symptoms.  Return if new or worsening symptoms develop.

## 2023-01-25 PROCEDURE — G0145 SCR C/V CYTO,THINLAYER,RESCR: HCPCS | Mod: ORL | Performed by: NURSE PRACTITIONER

## 2023-01-25 PROCEDURE — 87624 HPV HI-RISK TYP POOLED RSLT: CPT | Mod: ORL | Performed by: NURSE PRACTITIONER

## 2023-01-26 ENCOUNTER — LAB REQUISITION (OUTPATIENT)
Dept: LAB | Facility: CLINIC | Age: 33
End: 2023-01-26
Payer: COMMERCIAL

## 2023-01-26 DIAGNOSIS — Z36.9 ENCOUNTER FOR ANTENATAL SCREENING, UNSPECIFIED: ICD-10-CM

## 2023-01-26 DIAGNOSIS — E66.9 OBESITY, UNSPECIFIED: ICD-10-CM

## 2023-01-26 DIAGNOSIS — Z12.4 ENCOUNTER FOR SCREENING FOR MALIGNANT NEOPLASM OF CERVIX: ICD-10-CM

## 2023-01-26 PROCEDURE — 86803 HEPATITIS C AB TEST: CPT | Mod: ORL | Performed by: NURSE PRACTITIONER

## 2023-01-26 PROCEDURE — 87491 CHLMYD TRACH DNA AMP PROBE: CPT | Mod: ORL | Performed by: NURSE PRACTITIONER

## 2023-01-26 PROCEDURE — 80081 OBSTETRIC PANEL INC HIV TSTG: CPT | Mod: ORL | Performed by: NURSE PRACTITIONER

## 2023-01-26 PROCEDURE — 83036 HEMOGLOBIN GLYCOSYLATED A1C: CPT | Mod: ORL | Performed by: NURSE PRACTITIONER

## 2023-01-26 PROCEDURE — 87086 URINE CULTURE/COLONY COUNT: CPT | Mod: ORL | Performed by: NURSE PRACTITIONER

## 2023-01-27 LAB
ABO/RH(D): NORMAL
ANTIBODY SCREEN: NEGATIVE
BASOPHILS # BLD AUTO: 0.1 10E3/UL (ref 0–0.2)
BASOPHILS NFR BLD AUTO: 0 %
EOSINOPHIL # BLD AUTO: 0.1 10E3/UL (ref 0–0.7)
EOSINOPHIL NFR BLD AUTO: 1 %
ERYTHROCYTE [DISTWIDTH] IN BLOOD BY AUTOMATED COUNT: 13 % (ref 10–15)
HBA1C MFR BLD: 5.6 %
HBV SURFACE AG SERPL QL IA: NONREACTIVE
HCT VFR BLD AUTO: 42.5 % (ref 35–47)
HCV AB SERPL QL IA: NONREACTIVE
HGB BLD-MCNC: 13.7 G/DL (ref 11.7–15.7)
HIV 1+2 AB+HIV1 P24 AG SERPL QL IA: NONREACTIVE
IMM GRANULOCYTES # BLD: 0.1 10E3/UL
IMM GRANULOCYTES NFR BLD: 0 %
LYMPHOCYTES # BLD AUTO: 2.5 10E3/UL (ref 0.8–5.3)
LYMPHOCYTES NFR BLD AUTO: 18 %
MCH RBC QN AUTO: 28.3 PG (ref 26.5–33)
MCHC RBC AUTO-ENTMCNC: 32.2 G/DL (ref 31.5–36.5)
MCV RBC AUTO: 88 FL (ref 78–100)
MONOCYTES # BLD AUTO: 0.5 10E3/UL (ref 0–1.3)
MONOCYTES NFR BLD AUTO: 4 %
NEUTROPHILS # BLD AUTO: 10.8 10E3/UL (ref 1.6–8.3)
NEUTROPHILS NFR BLD AUTO: 77 %
NRBC # BLD AUTO: 0 10E3/UL
NRBC BLD AUTO-RTO: 0 /100
PLATELET # BLD AUTO: 280 10E3/UL (ref 150–450)
RBC # BLD AUTO: 4.84 10E6/UL (ref 3.8–5.2)
RPR SER QL: NONREACTIVE
RUBV IGG SERPL QL IA: 2.18 INDEX
RUBV IGG SERPL QL IA: POSITIVE
SPECIMEN EXPIRATION DATE: NORMAL
WBC # BLD AUTO: 14 10E3/UL (ref 4–11)

## 2023-01-28 LAB
BACTERIA UR CULT: NO GROWTH
C TRACH DNA SPEC QL PROBE+SIG AMP: NEGATIVE
N GONORRHOEA DNA SPEC QL NAA+PROBE: NEGATIVE

## 2023-01-30 ENCOUNTER — HOSPITAL ENCOUNTER (EMERGENCY)
Facility: CLINIC | Age: 33
Discharge: HOME | End: 2023-01-30
Attending: NURSE PRACTITIONER | Admitting: NURSE PRACTITIONER
Payer: COMMERCIAL

## 2023-01-30 ENCOUNTER — APPOINTMENT (OUTPATIENT)
Dept: ULTRASOUND IMAGING | Facility: CLINIC | Age: 33
End: 2023-01-30
Attending: NURSE PRACTITIONER
Payer: COMMERCIAL

## 2023-01-30 VITALS
HEIGHT: 64 IN | SYSTOLIC BLOOD PRESSURE: 114 MMHG | RESPIRATION RATE: 20 BRPM | BODY MASS INDEX: 46.78 KG/M2 | OXYGEN SATURATION: 99 % | WEIGHT: 274 LBS | HEART RATE: 73 BPM | DIASTOLIC BLOOD PRESSURE: 72 MMHG | TEMPERATURE: 97.6 F

## 2023-01-30 DIAGNOSIS — O20.9 BLEEDING IN EARLY PREGNANCY: ICD-10-CM

## 2023-01-30 PROBLEM — J30.2 SEASONAL ALLERGIES: Status: ACTIVE | Noted: 2019-07-01

## 2023-01-30 PROBLEM — K63.5 POLYP OF COLON: Status: ACTIVE | Noted: 2022-05-25

## 2023-01-30 LAB
ABO/RH(D): NORMAL
ABO/RH(D): NORMAL
ANTIBODY SCREEN: NEGATIVE
BKR LAB AP GYN ADEQUACY: NORMAL
BKR LAB AP GYN INTERPRETATION: NORMAL
BKR LAB AP HPV REFLEX: NORMAL
BKR LAB AP LMP: NORMAL
BKR LAB AP PREVIOUS ABNL DX: NORMAL
BKR LAB AP PREVIOUS ABNORMAL: NORMAL
FETAL BLEED SCREEN: NORMAL
HCG INTACT+B SERPL-ACNC: ABNORMAL MIU/ML
HCG SERPL QL: POSITIVE
HOLD SPECIMEN: NORMAL
HOLD SPECIMEN: NORMAL
PATH REPORT.COMMENTS IMP SPEC: NORMAL
PATH REPORT.COMMENTS IMP SPEC: NORMAL
PATH REPORT.RELEVANT HX SPEC: NORMAL
SPECIMEN EXPIRATION DATE: NORMAL
SPECIMEN EXPIRATION DATE: NORMAL

## 2023-01-30 PROCEDURE — 36415 COLL VENOUS BLD VENIPUNCTURE: CPT | Performed by: NURSE PRACTITIONER

## 2023-01-30 PROCEDURE — 86901 BLOOD TYPING SEROLOGIC RH(D): CPT | Performed by: NURSE PRACTITIONER

## 2023-01-30 PROCEDURE — 85461 HEMOGLOBIN FETAL: CPT | Performed by: NURSE PRACTITIONER

## 2023-01-30 PROCEDURE — 86850 RBC ANTIBODY SCREEN: CPT | Performed by: NURSE PRACTITIONER

## 2023-01-30 PROCEDURE — 99284 EMERGENCY DEPT VISIT MOD MDM: CPT | Performed by: NURSE PRACTITIONER

## 2023-01-30 PROCEDURE — 96372 THER/PROPH/DIAG INJ SC/IM: CPT | Performed by: NURSE PRACTITIONER

## 2023-01-30 PROCEDURE — 99285 EMERGENCY DEPT VISIT HI MDM: CPT | Mod: 25 | Performed by: NURSE PRACTITIONER

## 2023-01-30 PROCEDURE — 250N000011 HC RX IP 250 OP 636: Performed by: NURSE PRACTITIONER

## 2023-01-30 PROCEDURE — 84702 CHORIONIC GONADOTROPIN TEST: CPT | Performed by: NURSE PRACTITIONER

## 2023-01-30 PROCEDURE — 76801 OB US < 14 WKS SINGLE FETUS: CPT

## 2023-01-30 PROCEDURE — 84703 CHORIONIC GONADOTROPIN ASSAY: CPT | Performed by: NURSE PRACTITIONER

## 2023-01-30 RX ORDER — FOLIC ACID 1 MG/1
4 TABLET ORAL
COMMUNITY

## 2023-01-30 RX ORDER — PNV NO.95/FERROUS FUM/FOLIC AC 28MG-0.8MG
1 TABLET ORAL DAILY
COMMUNITY
End: 2024-01-24

## 2023-01-30 RX ADMIN — HUMAN RHO(D) IMMUNE GLOBULIN 300 MCG: 1500 SOLUTION INTRAMUSCULAR; INTRAVENOUS at 23:01

## 2023-01-30 ASSESSMENT — ACTIVITIES OF DAILY LIVING (ADL): ADLS_ACUITY_SCORE: 35

## 2023-01-31 NOTE — ED NOTES
8weeks preg with  status  is Rh -    Around 1730 noticed brownish spotting then again with wiping on the 3 rd time was some BRB.. is not requiring pad just spotting with wiping cramping also.    Had 1st OB appt last Thursday

## 2023-01-31 NOTE — ED TRIAGE NOTES
Patient reports today ~ 1730 started to experience cramping. While wiping noted small red clots. Discharge had been pink/brown. Patient is ~ 8 weeks pregnant. Miscarriage with last pregnancy at 8 wks 1 day.      Triage Assessment     Row Name 01/30/23 2046       Triage Assessment (Adult)    Airway WDL WDL       Respiratory WDL    Respiratory WDL WDL       Skin Circulation/Temperature WDL    Skin Circulation/Temperature WDL WDL       Cardiac WDL    Cardiac WDL WDL       Peripheral/Neurovascular WDL    Peripheral Neurovascular WDL WDL       Cognitive/Neuro/Behavioral WDL    Cognitive/Neuro/Behavioral WDL WDL

## 2023-01-31 NOTE — ED PROVIDER NOTES
History     Chief Complaint   Patient presents with     Vaginal Bleeding - Pregnant     HPI  Huong Meredith is a 32 year old female  7 para 4 living 3 with a history of 2 SABs-1 biochemical loss, one 8-week loss- 1 pregnancy with anencephaly born at 36 weeks passed away within minutes of birth who presents to the emergency department with early pregnancy with last normal menstrual period 2022 currently at roughly 8 weeks of pregnancy with onset of mild lower abdominal cramping and fullness sensation with some bleeding and pink discharge noted with wiping since this afternoon.  Patient denies recent intercourse.  Patient denies vaginal pain, dysuria.    Patient reports saw OB/GYN last Thursday and had a vaginal ultrasound that confirmed pregnancy and the ultrasound report revealed single IUP at 7 weeks and 2 days.  Patient also had her initial prenatal panel done at that time.    Patient reports she is otherwise feeling well and denies any other concerns today.  Patient reports the pregnancy was unplanned, is not currently on birth control or was not on birth control at time of conception.    Allergies:  Allergies   Allergen Reactions     Buspirone Rash     Calamine Rash     Nickel Chloride  [Nickel] Rash       Problem List:    Patient Active Problem List    Diagnosis Date Noted     Polyp of colon 2022     Priority: Medium     Depressive disorder 2021     Priority: Medium     Seasonal allergies 2019     Priority: Medium     Anxiety 2018     Priority: Medium     Diverticulosis of large intestine 2018     Priority: Medium     Hemorrhoids, internal 2018     Priority: Medium     History of colonic polyps 2018     Priority: Medium     Morbid obesity with BMI of 40.0-44.9, adult (H) 2016     Priority: Medium     Prior fetal anencephaly in third trimester, antepartum 2016     Priority: Medium        Past Medical History:    History reviewed. No  "pertinent past medical history.    Past Surgical History:    History reviewed. No pertinent surgical history.    Family History:    History reviewed. No pertinent family history.    Social History:  Marital Status:  Single [1]  Social History     Tobacco Use     Smoking status: Never     Smokeless tobacco: Never        Medications:    folic acid (FOLVITE) 1 MG tablet  Prenatal Vit-Fe Fumarate-FA (PRENATAL VITAMIN) 27-0.8 MG TABS      Review of Systems  As mentioned above in the history present illness. All other systems were reviewed and are negative.    Physical Exam   BP: 137/77  Pulse: 83  Temp: 97.6  F (36.4  C)  Resp: 20  Height: 162.6 cm (5' 4\")  Weight: 124.3 kg (274 lb)  SpO2: 99 %      Physical Exam  Vitals and nursing note reviewed.   Constitutional:       General: She is not in acute distress.     Appearance: Normal appearance. She is well-developed. She is obese. She is not ill-appearing, toxic-appearing or diaphoretic.   HENT:      Head: Normocephalic and atraumatic.      Right Ear: Hearing and external ear normal.      Left Ear: Hearing and external ear normal.      Nose: Nose normal.      Mouth/Throat:      Pharynx: Uvula midline.      Tonsils: No tonsillar exudate.   Eyes:      General:         Right eye: No discharge.         Left eye: No discharge.      Conjunctiva/sclera: Conjunctivae normal.   Cardiovascular:      Rate and Rhythm: Normal rate and regular rhythm.      Heart sounds: Normal heart sounds. No murmur heard.    No friction rub.   Pulmonary:      Effort: Pulmonary effort is normal. No respiratory distress.      Breath sounds: Normal breath sounds. No stridor. No wheezing or rales.   Chest:      Chest wall: No tenderness.   Abdominal:      General: Bowel sounds are normal. There is no distension.      Palpations: Abdomen is soft. There is no mass.      Tenderness: There is no abdominal tenderness. There is no guarding or rebound.      Hernia: No hernia is present.   Skin:     General: " Skin is warm and dry.      Coloration: Skin is not pale.      Findings: No erythema or rash.   Neurological:      Mental Status: She is alert.   Psychiatric:         Mood and Affect: Mood normal.         ED Course          Procedures      Results for orders placed or performed during the hospital encounter of 01/30/23 (from the past 24 hour(s))   HCG qualitative Blood   Result Value Ref Range    hCG Serum Qualitative Positive (A) Negative   HCG quantitative pregnancy   Result Value Ref Range    hCG Quantitative 31,338 (H) <5 mIU/mL   [place] Draw    Narrative    The following orders were created for panel order [place] Draw.  Procedure                               Abnormality         Status                     ---------                               -----------         ------                     Extra Blue Top Tube[[id number]]                              In process                 Extra Red Top Tube[[id number]]                                                          Extra Green Top (Lithium...[[id number]]                                                 Extra Purple Top Tube[[id number]]                            In process                   Please view results for these tests on the individual orders.   ABO/Rh type and screen    Narrative    The following orders were created for panel order ABO/Rh type and screen.  Procedure                               Abnormality         Status                     ---------                               -----------         ------                     Adult Type and Screen[[id number]]                            In process                   Please view results for these tests on the individual orders.   US OB <14 Weeks w Transvaginal Single    Narrative    EXAM: US OB <14 WEEKS WITH TRANSVAGINAL SINGLE  LOCATION: [facility]  DATE/TIME: 1/30/2023 10:59 PM    INDICATION: Bleeding in first trimester approximately 8 weeks, history of SAB.  COMPARISON:  None.  TECHNIQUE: Transabdominal scans were performed. Endovaginal ultrasound was performed to better visualize the embryo.    FINDINGS:  UTERUS: Single normal appearing intrauterine gestation sac.  CRL: Measures 1.5 cm, equals 7 weeks 6 days.  FETAL HEART RATE: 158 bpm.  AMNIOTIC FLUID: Normal.  PLACENTA: Not yet formed. No evidence for sub-chorionic hemorrhage.    RIGHT OVARY: 2.2 cm corpus luteum cyst right ovary. Otherwise negative.  LEFT OVARY: Not visualized due to overlying bowel gas.      Impression    IMPRESSION:   1.  Single living intrauterine gestation at 7 weeks 6 days, EDC 2023.           Medications   rho(D) immune globulin (RHOPHYLAC) injection 300 mcg (300 mcg Intramuscular Given 23)       Assessments & Plan (with Medical Decision Making)     I have reviewed the nursing notes.    I have reviewed the findings, diagnosis, plan and need for follow up with the patient.    Huong Meredith is a 32 year old female  7 para 4 living 3 with a history of 2 SABs-1 biochemical loss, one 8-week loss- 1 pregnancy with anencephaly born at 36 weeks passed away within minutes of birth who presents to the emergency department with early pregnancy with last normal menstrual period 2022 currently at roughly 8 weeks of pregnancy with onset of mild lower abdominal cramping and fullness sensation with some bleeding and pink discharge noted with wiping since this afternoon.  Pregnancy is unplanned.  Confirmation of pregnancy ultrasound completed last Thursday revealing single live intrauterine pregnancy at approximately 7 weeks 2 days gestation.  Patient denying any recent intercourse within the last 24 hours.  Work-up today includes quantitative hCG, blood type is noted to be negative and RhoGAM ordered due to bleeding at less than 12 weeks of pregnancy.  Ultrasound ordered to confirm pregnancy viability.  Patient denying any urinary symptoms and therefore UA is deferred.  Differential  diagnosis bleeding in early pregnancy with viability or without viability as patient reports single intrauterine pregnancy less likely ectopic or heterotopic in addition to this, possible subchorionic bleed, missed AB.  Ultrasound reveals single live intrauterine pregnancy, quantitative hCG qualitative.  RhoGAM administered.  Discussed all these findings with patient.  Discussed precautions.  Recommend pelvic rest until bleeding is completely resolved +3 additional days.  Recommend consulting with OB tomorrow and advising them of visit today and findings.  Discussed worrisome reasons to return.  Patient verbalized understanding and discharged in stable condition.    New Prescriptions    No medications on file       Final diagnoses:   Bleeding in early pregnancy       1/30/2023   [facility] EMERGENCY DEPT     Lauri, Cherise Santiago, APRN CNP  01/30/23 [id number]

## 2023-01-31 NOTE — DISCHARGE INSTRUCTIONS
Ultrasound completed today reveals single live intrauterine pregnancy.  You may look at the ultrasound report inside this paperwork and bring this to your OB and they can review testing completed today.  A RhoGAM shot was administered due to the bleeding.  Please call your OB/GYN tomorrow and advise them of today's visits.  Follow-up as needed and as currently scheduled.  No intercourse until bleeding/spotting is completely resolved and then wait 3 additional days.  Return to the emergency room if you should develop severe abdominal pain, uncontrolled bleeding.

## 2023-02-01 LAB
HUMAN PAPILLOMA VIRUS 16 DNA: NEGATIVE
HUMAN PAPILLOMA VIRUS 18 DNA: NEGATIVE
HUMAN PAPILLOMA VIRUS FINAL DIAGNOSIS: NORMAL
HUMAN PAPILLOMA VIRUS OTHER HR: NEGATIVE

## 2023-02-04 ENCOUNTER — HOSPITAL ENCOUNTER (EMERGENCY)
Facility: CLINIC | Age: 33
Discharge: HOME | End: 2023-02-04
Payer: COMMERCIAL

## 2023-02-04 VITALS
OXYGEN SATURATION: 98 % | SYSTOLIC BLOOD PRESSURE: 108 MMHG | HEART RATE: 114 BPM | RESPIRATION RATE: 20 BRPM | TEMPERATURE: 97.9 F | DIASTOLIC BLOOD PRESSURE: 72 MMHG

## 2023-02-04 DIAGNOSIS — J02.0 ACUTE STREPTOCOCCAL PHARYNGITIS: ICD-10-CM

## 2023-02-04 LAB
DEPRECATED S PYO AG THROAT QL EIA: POSITIVE
FLUAV RNA SPEC QL NAA+PROBE: NEGATIVE
FLUBV RNA RESP QL NAA+PROBE: NEGATIVE
RSV RNA SPEC NAA+PROBE: NEGATIVE
SARS-COV-2 RNA RESP QL NAA+PROBE: NEGATIVE

## 2023-02-04 PROCEDURE — G0463 HOSPITAL OUTPT CLINIC VISIT: HCPCS | Mod: CS

## 2023-02-04 PROCEDURE — 87637 SARSCOV2&INF A&B&RSV AMP PRB: CPT

## 2023-02-04 PROCEDURE — 87880 STREP A ASSAY W/OPTIC: CPT

## 2023-02-04 PROCEDURE — C9803 HOPD COVID-19 SPEC COLLECT: HCPCS

## 2023-02-04 PROCEDURE — 99213 OFFICE O/P EST LOW 20 MIN: CPT | Mod: CS

## 2023-02-04 RX ORDER — AMOXICILLIN 875 MG
875 TABLET ORAL 2 TIMES DAILY
Qty: 20 TABLET | Refills: 0 | Status: SHIPPED | OUTPATIENT
Start: 2023-02-04 | End: 2023-02-14

## 2023-02-04 ASSESSMENT — ACTIVITIES OF DAILY LIVING (ADL): ADLS_ACUITY_SCORE: 35

## 2023-02-04 NOTE — DISCHARGE INSTRUCTIONS
Amoxicillin for strep throat today.  This is safe in pregnancy.  Please return for new or worsening symptoms.  They recommend to avoid ibuprofen in pregnancy, and recent research also recommends avoiding Tylenol/acetaminophen as well as this may be linked to ADHD.  Please ensure that you are drinking lots of fluids.

## 2023-02-04 NOTE — ED PROVIDER NOTES
Chief Complaint:   Chief Complaint   Patient presents with     Pharyngitis         HPI:     Huong Meredith is a 32 year old female who presents to the  with a 1 day history of sore throat.  She has also had Malaise, congestion, and chills.  She has not had Fever, Vomitting, Diarrhea.   She has not had a rash. She reports her son had a strep throat a few weeks ago. Denies exposure to other illness. Patient is not vaccinated for covid or influenza. She reports she is also 8 week pregnant.     Medications:   Current Outpatient Medications   Medication Sig Dispense Refill     folic acid (FOLVITE) 1 MG tablet 4 mg       Prenatal Vit-Fe Fumarate-FA (PRENATAL VITAMIN) 27-0.8 MG TABS Take 1 tablet by mouth daily         Allergies:   Allergies   Allergen Reactions     Buspirone Rash     Calamine Rash     Nickel Chloride  [Nickel] Rash       Medications updated and reviewed.  Past, family and surgical history is updated and reviewed in the record.     Review of Systems:  General: see HPI  HENT: see HPI  Skin: see HPI    Physical Exam:   There were no vitals taken for this visit.   General: Patient is well nourished, well developed, obese, appearing stated age, normal affect  Ears: negative  Nose: no drainage.  Mouth/Throat: bilateral adenopathy and erythematous.  Trismus is not present. Muffled voice is not present. Uvular shift is not present.   Neck: negative findings: no asymmetry, masses, or scars, trachea midline and normal to palpation, no jugular venous distention, positive findings: cervical lymphadenopathy   Chest/Pulmonary: clear to auscultation      Medical Decision Making:  Sore throat with no exam findings to suggest peritonsillar abscess.  The rapid strep test was POSITIVE..  Culture is pending    Assessment:  Strep pharyngitis    Plan:   We will treat symptoms of pharyngitis and antibiotics are indicated. Amoxicillin was prescribed    She was advised to gargle with warm salt water 4 times a day and try to  drink as much fluid as possible. Use acetaminophen for discomfort. Return to the ER with increased pain, inability to swallow fluids, fever, rash or any concerns.     Condition on disposition: Stable      Disclaimer:  This note consists of symbols derived from keyboarding, dictation and/or voice recognition software.  As a result, there may be errors in the script that have gone undetected.  Please consider this when interpreting information found in this chart.       Moo Marquez APRN CNP  02/04/23 [id number]

## 2023-03-22 ENCOUNTER — LAB REQUISITION (OUTPATIENT)
Dept: LAB | Facility: CLINIC | Age: 33
End: 2023-03-22
Payer: COMMERCIAL

## 2023-03-22 DIAGNOSIS — Z13.79 ENCOUNTER FOR OTHER SCREENING FOR GENETIC AND CHROMOSOMAL ANOMALIES: ICD-10-CM

## 2023-03-22 PROCEDURE — 81511 FTL CGEN ABNOR FOUR ANAL: CPT | Mod: ORL | Performed by: NURSE PRACTITIONER

## 2023-03-25 LAB
# FETUSES US: NORMAL
AFP MOM SERPL: 0.87
AFP SERPL-MCNC: 17 NG/ML
AGE - REPORTED: 31.1 YR
CURRENT SMOKER: NO
FAMILY MEMBER DISEASES HX: NO
GA METHOD: NORMAL
GA: NORMAL WK
HCG MOM SERPL: 0.58
HCG SERPL-ACNC: NORMAL IU/L
HX OF HEREDITARY DISORDERS: NO
IDDM PATIENT QL: NO
INHIBIN A MOM SERPL: 0.87
INHIBIN A SERPL-MCNC: 100 PG/ML
INTEGRATED SCN PATIENT-IMP: NORMAL
PATHOLOGY STUDY: NORMAL
SPECIMEN DRAWN SERPL: NORMAL
U ESTRIOL MOM SERPL: 1.3
U ESTRIOL SERPL-MCNC: 0.76 NG/ML

## 2023-04-07 ENCOUNTER — HOSPITAL ENCOUNTER (EMERGENCY)
Facility: CLINIC | Age: 33
Discharge: HOME | End: 2023-04-07
Attending: FAMILY MEDICINE | Admitting: FAMILY MEDICINE
Payer: COMMERCIAL

## 2023-04-07 VITALS
SYSTOLIC BLOOD PRESSURE: 114 MMHG | DIASTOLIC BLOOD PRESSURE: 67 MMHG | HEART RATE: 76 BPM | RESPIRATION RATE: 18 BRPM | OXYGEN SATURATION: 100 % | WEIGHT: 283 LBS | HEIGHT: 64 IN | BODY MASS INDEX: 48.32 KG/M2 | TEMPERATURE: 97.6 F

## 2023-04-07 DIAGNOSIS — R10.32 LLQ ABDOMINAL PAIN: ICD-10-CM

## 2023-04-07 LAB
ALBUMIN UR-MCNC: NEGATIVE MG/DL
ANION GAP SERPL CALCULATED.3IONS-SCNC: 12 MMOL/L (ref 7–15)
APPEARANCE UR: CLEAR
BASOPHILS # BLD AUTO: 0 10E3/UL (ref 0–0.2)
BASOPHILS NFR BLD AUTO: 0 %
BILIRUB UR QL STRIP: NEGATIVE
BUN SERPL-MCNC: 5.5 MG/DL (ref 6–20)
CALCIUM SERPL-MCNC: 9 MG/DL (ref 8.6–10)
CHLORIDE SERPL-SCNC: 101 MMOL/L (ref 98–107)
COLOR UR AUTO: YELLOW
CREAT SERPL-MCNC: 0.68 MG/DL (ref 0.51–0.95)
DEPRECATED HCO3 PLAS-SCNC: 21 MMOL/L (ref 22–29)
EOSINOPHIL # BLD AUTO: 0.2 10E3/UL (ref 0–0.7)
EOSINOPHIL NFR BLD AUTO: 1 %
ERYTHROCYTE [DISTWIDTH] IN BLOOD BY AUTOMATED COUNT: 13.2 % (ref 10–15)
GFR SERPL CREATININE-BSD FRML MDRD: >90 ML/MIN/1.73M2
GLUCOSE SERPL-MCNC: 98 MG/DL (ref 70–99)
GLUCOSE UR STRIP-MCNC: NEGATIVE MG/DL
HCT VFR BLD AUTO: 37.2 % (ref 35–47)
HGB BLD-MCNC: 12.5 G/DL (ref 11.7–15.7)
HGB UR QL STRIP: NEGATIVE
IMM GRANULOCYTES # BLD: 0.1 10E3/UL
IMM GRANULOCYTES NFR BLD: 1 %
KETONES UR STRIP-MCNC: NEGATIVE MG/DL
LEUKOCYTE ESTERASE UR QL STRIP: NEGATIVE
LYMPHOCYTES # BLD AUTO: 3 10E3/UL (ref 0.8–5.3)
LYMPHOCYTES NFR BLD AUTO: 20 %
MCH RBC QN AUTO: 28.7 PG (ref 26.5–33)
MCHC RBC AUTO-ENTMCNC: 33.6 G/DL (ref 31.5–36.5)
MCV RBC AUTO: 85 FL (ref 78–100)
MONOCYTES # BLD AUTO: 0.6 10E3/UL (ref 0–1.3)
MONOCYTES NFR BLD AUTO: 4 %
MUCOUS THREADS #/AREA URNS LPF: PRESENT /LPF
NEUTROPHILS # BLD AUTO: 11.3 10E3/UL (ref 1.6–8.3)
NEUTROPHILS NFR BLD AUTO: 74 %
NITRATE UR QL: NEGATIVE
NRBC # BLD AUTO: 0 10E3/UL
NRBC BLD AUTO-RTO: 0 /100
PH UR STRIP: 5 [PH] (ref 5–7)
PLATELET # BLD AUTO: 273 10E3/UL (ref 150–450)
POTASSIUM SERPL-SCNC: 4.1 MMOL/L (ref 3.4–5.3)
RBC # BLD AUTO: 4.36 10E6/UL (ref 3.8–5.2)
RBC URINE: 0 /HPF
SODIUM SERPL-SCNC: 134 MMOL/L (ref 136–145)
SP GR UR STRIP: 1.02 (ref 1–1.03)
SQUAMOUS EPITHELIAL: 1 /HPF
UROBILINOGEN UR STRIP-MCNC: NORMAL MG/DL
WBC # BLD AUTO: 15.3 10E3/UL (ref 4–11)
WBC URINE: 0 /HPF

## 2023-04-07 PROCEDURE — 99283 EMERGENCY DEPT VISIT LOW MDM: CPT | Performed by: FAMILY MEDICINE

## 2023-04-07 PROCEDURE — 85025 COMPLETE CBC W/AUTO DIFF WBC: CPT | Performed by: FAMILY MEDICINE

## 2023-04-07 PROCEDURE — 80048 BASIC METABOLIC PNL TOTAL CA: CPT | Performed by: FAMILY MEDICINE

## 2023-04-07 PROCEDURE — 258N000003 HC RX IP 258 OP 636: Performed by: FAMILY MEDICINE

## 2023-04-07 PROCEDURE — 96360 HYDRATION IV INFUSION INIT: CPT | Performed by: FAMILY MEDICINE

## 2023-04-07 PROCEDURE — 99284 EMERGENCY DEPT VISIT MOD MDM: CPT | Performed by: FAMILY MEDICINE

## 2023-04-07 PROCEDURE — 36415 COLL VENOUS BLD VENIPUNCTURE: CPT | Performed by: FAMILY MEDICINE

## 2023-04-07 PROCEDURE — 81001 URINALYSIS AUTO W/SCOPE: CPT | Performed by: FAMILY MEDICINE

## 2023-04-07 RX ADMIN — SODIUM CHLORIDE 1000 ML: 9 INJECTION, SOLUTION INTRAVENOUS at 16:38

## 2023-04-07 ASSESSMENT — ACTIVITIES OF DAILY LIVING (ADL)
ADLS_ACUITY_SCORE: 35
ADLS_ACUITY_SCORE: 35

## 2023-04-07 NOTE — ED TRIAGE NOTES
"Multiple complaints.  LLQ pain last night, \"lightheaded, rash and sore right arm.\"     Triage Assessment     Row Name 04/07/23 0332       Triage Assessment (Adult)    Airway WDL WDL       Respiratory WDL    Respiratory WDL WDL       Skin Circulation/Temperature WDL    Skin Circulation/Temperature WDL WDL       Cardiac WDL    Cardiac WDL WDL       Peripheral/Neurovascular WDL    Peripheral Neurovascular WDL WDL       Cognitive/Neuro/Behavioral WDL    Cognitive/Neuro/Behavioral WDL WDL              "

## 2023-04-07 NOTE — ED PROVIDER NOTES
HPI   Patient is a 32-year-old female presenting with left lower quadrant cramping pain that comes and goes.  Known history of diverticulitis treated with antibiotics a few years ago.  She is currently pregnant and 17 weeks 3 days gestation.  She has been seen by OB recently.  She has had 4 prior vaginal deliveries.  Known to be morbidly obese.  Known to have internal hemorrhoids.  Known to have anxiety depression.    Symptoms of pain started yesterday.  She feels generally rundown and tired.  She has had a normal appetite but feels dehydrated.  Her left lower quadrant pain comes and goes.  It seems to worsen when she is upright and walking.  No nausea.  No vomiting.  No fever.  No dysuria, urgency, or frequency of urination.  No vaginal discharge or irritation of the ordinary.  No vaginal bleeding.  She has intermittent constipation which is not unusual.  Her last BM was this morning.  No hematochezia or melena.  No trauma or injury.  No new activities or overuse.        Allergies:  Allergies   Allergen Reactions     Buspirone Rash     Calamine Rash     Nickel Chloride  [Nickel] Rash     Problem List:    Patient Active Problem List    Diagnosis Date Noted     Polyp of colon 05/25/2022     Priority: Medium     Depressive disorder 04/02/2021     Priority: Medium     Seasonal allergies 07/01/2019     Priority: Medium     Anxiety 06/13/2018     Priority: Medium     Diverticulosis of large intestine 06/13/2018     Priority: Medium     Hemorrhoids, internal 06/13/2018     Priority: Medium     History of colonic polyps 06/13/2018     Priority: Medium     Morbid obesity with BMI of 40.0-44.9, adult (H) 01/28/2016     Priority: Medium     Prior fetal anencephaly in third trimester, antepartum 01/28/2016     Priority: Medium      Past Medical History:    No past medical history on file.  Past Surgical History:    No past surgical history on file.  Family History:    No family history on file.  Social History:  Marital  "Status:  Single [1]  Social History     Tobacco Use     Smoking status: Never     Smokeless tobacco: Never      Medications:    amoxicillin-clavulanate (AUGMENTIN) 875-125 MG tablet  folic acid (FOLVITE) 1 MG tablet  Prenatal Vit-Fe Fumarate-FA (PRENATAL VITAMIN) 27-0.8 MG TABS      Review of Systems   All other systems reviewed and are negative.      PE   BP: 121/77  Pulse: 83  Temp: 97.6  F (36.4  C)  Height: 162.6 cm (5' 4\")  Weight: 128.4 kg (283 lb)  SpO2: 98 %  Physical Exam  Vitals reviewed.   Constitutional:       General: She is not in acute distress.     Appearance: She is well-developed. She is obese.      Comments: Concerned.  Cooperative.   HENT:      Head: Normocephalic and atraumatic.      Right Ear: External ear normal.      Left Ear: External ear normal.      Nose: Nose normal.      Mouth/Throat:      Mouth: Mucous membranes are moist.      Pharynx: Oropharynx is clear.   Eyes:      Extraocular Movements: Extraocular movements intact.      Conjunctiva/sclera: Conjunctivae normal.      Pupils: Pupils are equal, round, and reactive to light.   Cardiovascular:      Rate and Rhythm: Normal rate and regular rhythm.   Pulmonary:      Effort: Pulmonary effort is normal.   Abdominal:      Comments: She reports tenderness with palpation in the left lower quadrant but there is no guarding or grimace no tightening of the muscles.  Obese.  Difficult examination because of body habitus.    Musculoskeletal:         General: Normal range of motion.      Cervical back: Normal range of motion.   Skin:     General: Skin is warm and dry.   Neurological:      Mental Status: She is alert and oriented to person, place, and time.   Psychiatric:         Behavior: Behavior normal.         ED COURSE and [facility]   1629.  Patient has symptoms and signs as described above.  Recent ultrasound unremarkable.  Blood work and urine analysis pending.  We talked at length about simply watching and waiting given her concern for " "diverticulitis.  However, she is concerned about dehydration and so an IV will be established and fluid given.    1849.  Patient has leukocytosis as expected.  The patient tells me she has had multiple pregnancies in the past and that she has not had pain like this previously, \"except when I have had ovarian problems or diverticulitis.\"  Low concern for ovarian pathology at this point in her pregnancy.  Diverticulitis is a possibility.  We talked about watchful waiting and avoiding antibiotics upfront.  This is my strong recommendation.  That said, if she has evolving pain and it becomes constant and brought her in location and more severe than antibiotics seem like a reasonable first-line treatment.  She is early in pregnancy and Augmentin would be an appropriate choice.  Patient agrees with the plan.  She would like to be discharged home.  Return here for worsening as discussed.  Follow-up as discussed.    Electronic medical chart reviewed, including medical problems, medications, medical allergies, social history.  Recent hospitalizations and surgical procedures reviewed.  Recent clinic visits and consultations reviewed.  Recent labs and test results reviewed.  Nursing notes reviewed.    1901.  The patient, their parent if applicable, and/or their medical decision maker(s) and I have reviewed all of the available historical information, applicable PMH, physical exam findings, and objective diagnostic data gathered during this ED visit.  We then discussed all work-up options and then together agreed upon the course taken during this visit.  The ultimate disposition and plan was a cooperative decision made between myself and the patient, their parent if applicable, and/or their legal decision maker(s).  The risks and benefits of all decisions made during this visit were discussed to the best of my abilities given the circumstances, and all parties are understanding of the pertinent ramifications of these decisions. "      LABS  Labs Ordered and Resulted from Time of ED Arrival to Time of ED Departure   BASIC METABOLIC PANEL - Abnormal       Result Value    Sodium 134 (*)     Potassium 4.1      Chloride 101      Carbon Dioxide (CO2) 21 (*)     Anion Gap 12      Urea Nitrogen 5.5 (*)     Creatinine 0.68      Calcium 9.0      Glucose 98      GFR Estimate >90     ROUTINE UA WITH MICROSCOPIC REFLEX TO CULTURE - Abnormal    Color Urine Yellow      Appearance Urine Clear      Glucose Urine Negative      Bilirubin Urine Negative      Ketones Urine Negative      Specific Gravity Urine 1.016      Blood Urine Negative      pH Urine 5.0      Protein Albumin Urine Negative      Urobilinogen Urine Normal      Nitrite Urine Negative      Leukocyte Esterase Urine Negative      Mucus Urine Present (*)     RBC Urine 0      WBC Urine 0      Squamous Epithelials Urine 1     CBC WITH PLATELETS AND DIFFERENTIAL - Abnormal    WBC Count 15.3 (*)     RBC Count 4.36      Hemoglobin 12.5      Hematocrit 37.2      MCV 85      MCH 28.7      MCHC 33.6      RDW 13.2      Platelet Count 273      % Neutrophils 74      % Lymphocytes 20      % Monocytes 4      % Eosinophils 1      % Basophils 0      % Immature Granulocytes 1      NRBCs per 100 WBC 0      Absolute Neutrophils 11.3 (*)     Absolute Lymphocytes 3.0      Absolute Monocytes 0.6      Absolute Eosinophils 0.2      Absolute Basophils 0.0      Absolute Immature Granulocytes 0.1      Absolute NRBCs 0.0         IMAGING  Images reviewed by me.  Radiology report also reviewed.  No orders to display       Procedures    Medications   0.9% sodium chloride BOLUS (0 mLs Intravenous Stopped 4/7/23 1900)   0.9% sodium chloride BOLUS (1,000 mLs Intravenous Not Given 4/7/23 1900)         IMPRESSION       ICD-10-CM    1. LLQ abdominal pain  R10.32                Medication List      Started    amoxicillin-clavulanate 875-125 MG tablet  Commonly known as: Augmentin  1 tablet, Oral, 2 TIMES DAILY                         Taz Mcmanus MD  04/07/23 4761

## 2023-04-07 NOTE — DISCHARGE INSTRUCTIONS
RETURN TO THE EMERGENCY ROOM FOR THE FOLLOWING:    Severely worsened pain, fever greater than 101, repeated vomiting and dehydration, fainting, or at anytime for any concern.    FOLLOW UP:    With your primary clinic if not improved over the next 7 days.    TREATMENT RECOMMENDATIONS:    Consider Augmentin for pain that becomes constant and is radiating to her midline and is associated with fever symptoms, as discussed.    NURSE ADVICE LINE:  [phone] or [phone]

## 2023-04-13 ENCOUNTER — LAB REQUISITION (OUTPATIENT)
Dept: LAB | Facility: CLINIC | Age: 33
End: 2023-04-13
Payer: COMMERCIAL

## 2023-04-13 DIAGNOSIS — R52 PAIN, UNSPECIFIED: ICD-10-CM

## 2023-04-13 PROCEDURE — 87086 URINE CULTURE/COLONY COUNT: CPT | Mod: ORL | Performed by: NURSE PRACTITIONER

## 2023-04-15 LAB — BACTERIA UR CULT: NO GROWTH

## 2023-05-01 ENCOUNTER — LAB REQUISITION (OUTPATIENT)
Dept: LAB | Facility: CLINIC | Age: 33
End: 2023-05-01
Payer: COMMERCIAL

## 2023-05-01 DIAGNOSIS — R10.30 LOWER ABDOMINAL PAIN, UNSPECIFIED: ICD-10-CM

## 2023-05-01 PROCEDURE — 87086 URINE CULTURE/COLONY COUNT: CPT | Mod: ORL | Performed by: OBSTETRICS & GYNECOLOGY

## 2023-05-03 LAB — BACTERIA UR CULT: NO GROWTH

## 2023-05-17 ENCOUNTER — HOSPITAL ENCOUNTER (EMERGENCY)
Facility: CLINIC | Age: 33
Discharge: HOME | End: 2023-05-17
Attending: EMERGENCY MEDICINE | Admitting: EMERGENCY MEDICINE
Payer: COMMERCIAL

## 2023-05-17 VITALS
OXYGEN SATURATION: 99 % | SYSTOLIC BLOOD PRESSURE: 116 MMHG | WEIGHT: 280 LBS | TEMPERATURE: 98 F | HEART RATE: 77 BPM | BODY MASS INDEX: 47.8 KG/M2 | DIASTOLIC BLOOD PRESSURE: 81 MMHG | RESPIRATION RATE: 20 BRPM | HEIGHT: 64 IN

## 2023-05-17 DIAGNOSIS — R68.89 FEELING UNWELL: ICD-10-CM

## 2023-05-17 LAB
ALBUMIN UR-MCNC: 30 MG/DL
ANION GAP SERPL CALCULATED.3IONS-SCNC: 12 MMOL/L (ref 7–15)
APPEARANCE UR: ABNORMAL
BILIRUB UR QL STRIP: NEGATIVE
BUN SERPL-MCNC: 7.2 MG/DL (ref 6–20)
CALCIUM SERPL-MCNC: 9.5 MG/DL (ref 8.6–10)
CHLORIDE SERPL-SCNC: 102 MMOL/L (ref 98–107)
COLOR UR AUTO: ABNORMAL
CREAT SERPL-MCNC: 0.72 MG/DL (ref 0.51–0.95)
DEPRECATED HCO3 PLAS-SCNC: 24 MMOL/L (ref 22–29)
GFR SERPL CREATININE-BSD FRML MDRD: >90 ML/MIN/1.73M2
GLUCOSE SERPL-MCNC: 107 MG/DL (ref 70–99)
GLUCOSE UR STRIP-MCNC: NEGATIVE MG/DL
HGB UR QL STRIP: NEGATIVE
KETONES UR STRIP-MCNC: 5 MG/DL
LEUKOCYTE ESTERASE UR QL STRIP: NEGATIVE
MUCOUS THREADS #/AREA URNS LPF: PRESENT /LPF
NITRATE UR QL: NEGATIVE
PH UR STRIP: 5 [PH] (ref 5–7)
POTASSIUM SERPL-SCNC: 4.4 MMOL/L (ref 3.4–5.3)
RBC URINE: 4 /HPF
SODIUM SERPL-SCNC: 138 MMOL/L (ref 136–145)
SP GR UR STRIP: 1.03 (ref 1–1.03)
SQUAMOUS EPITHELIAL: 37 /HPF
TSH SERPL DL<=0.005 MIU/L-ACNC: 1.89 UIU/ML (ref 0.3–4.2)
UROBILINOGEN UR STRIP-MCNC: NORMAL MG/DL
WBC URINE: 2 /HPF

## 2023-05-17 PROCEDURE — 80048 BASIC METABOLIC PNL TOTAL CA: CPT | Performed by: EMERGENCY MEDICINE

## 2023-05-17 PROCEDURE — 84443 ASSAY THYROID STIM HORMONE: CPT | Performed by: EMERGENCY MEDICINE

## 2023-05-17 PROCEDURE — 99284 EMERGENCY DEPT VISIT MOD MDM: CPT

## 2023-05-17 PROCEDURE — 36415 COLL VENOUS BLD VENIPUNCTURE: CPT | Performed by: EMERGENCY MEDICINE

## 2023-05-17 PROCEDURE — 81001 URINALYSIS AUTO W/SCOPE: CPT | Performed by: EMERGENCY MEDICINE

## 2023-05-17 PROCEDURE — 99283 EMERGENCY DEPT VISIT LOW MDM: CPT | Performed by: EMERGENCY MEDICINE

## 2023-05-17 ASSESSMENT — ENCOUNTER SYMPTOMS
MUSCULOSKELETAL NEGATIVE: 1
CARDIOVASCULAR NEGATIVE: 1
DIZZINESS: 1
EYES NEGATIVE: 1
PSYCHIATRIC NEGATIVE: 1
LIGHT-HEADEDNESS: 1
ENDOCRINE NEGATIVE: 1
HEMATOLOGIC/LYMPHATIC NEGATIVE: 1
GASTROINTESTINAL NEGATIVE: 1
RESPIRATORY NEGATIVE: 1

## 2023-05-17 ASSESSMENT — ACTIVITIES OF DAILY LIVING (ADL): ADLS_ACUITY_SCORE: 33

## 2023-05-17 NOTE — DISCHARGE INSTRUCTIONS
1) Your evaluation did not reveal the cause of your symptoms as described and reported.  Your work-up did not suggest an emergency diagnosis.  We discussed the importance of following up with your care team if you have persistent symptoms.    2) Although you appear stable for discharge to home at this time if you develop new symptoms of concern you should return to

## 2023-05-17 NOTE — LETTER
May 17, 2023      To Whom It May Concern:      Huong Meredith was seen in our Emergency Department today, 05/17/23.  Please excuse her from missing work due to her evaluation in the emergency department.  If her symptoms persist or there are new concerns she will benefit from further evaluation and follow-up care with her care team.    Sincerely,          Aba Garcia MD

## 2023-05-17 NOTE — ED TRIAGE NOTES
Patient 23 weeks gestation.  Was standing and felt wave of dizziness  Now states just doesn't feel normal  Feels like ears are plugged.  No abd pain, cramping or contractions.  NO back pain      Triage Assessment     Row Name 05/17/23 0112       Triage Assessment (Adult)    Airway WDL WDL       Respiratory WDL    Respiratory WDL WDL       Skin Circulation/Temperature WDL    Skin Circulation/Temperature WDL WDL       Cardiac WDL    Cardiac WDL WDL       Peripheral/Neurovascular WDL    Peripheral Neurovascular WDL WDL       Cognitive/Neuro/Behavioral WDL    Cognitive/Neuro/Behavioral WDL WDL

## 2023-06-21 ENCOUNTER — LAB REQUISITION (OUTPATIENT)
Dept: LAB | Facility: CLINIC | Age: 33
End: 2023-06-21
Payer: COMMERCIAL

## 2023-06-21 DIAGNOSIS — O36.0920 MATERNAL CARE FOR OTHER RHESUS ISOIMMUNIZATION, SECOND TRIMESTER, NOT APPLICABLE OR UNSPECIFIED: ICD-10-CM

## 2023-06-21 PROCEDURE — 86850 RBC ANTIBODY SCREEN: CPT | Mod: ORL | Performed by: NURSE PRACTITIONER

## 2023-06-22 LAB
ANTIBODY SCREEN: NEGATIVE
SPECIMEN EXPIRATION DATE: NORMAL

## 2023-07-05 ENCOUNTER — LAB REQUISITION (OUTPATIENT)
Dept: LAB | Facility: CLINIC | Age: 33
End: 2023-07-05
Payer: COMMERCIAL

## 2023-07-05 DIAGNOSIS — O26.899 OTHER SPECIFIED PREGNANCY RELATED CONDITIONS, UNSPECIFIED TRIMESTER: ICD-10-CM

## 2023-07-05 PROCEDURE — 87086 URINE CULTURE/COLONY COUNT: CPT | Mod: ORL | Performed by: NURSE PRACTITIONER

## 2023-07-07 LAB — BACTERIA UR CULT: NO GROWTH

## 2023-07-08 ENCOUNTER — HEALTH MAINTENANCE LETTER (OUTPATIENT)
Age: 33
End: 2023-07-08

## 2023-07-09 ENCOUNTER — HOSPITAL ENCOUNTER (OUTPATIENT)
Facility: CLINIC | Age: 33
Discharge: HOME | End: 2023-07-09
Attending: OBSTETRICS & GYNECOLOGY | Admitting: OBSTETRICS & GYNECOLOGY
Payer: COMMERCIAL

## 2023-07-09 ENCOUNTER — HOSPITAL ENCOUNTER (OUTPATIENT)
Facility: CLINIC | Age: 33
End: 2023-07-09
Admitting: OBSTETRICS & GYNECOLOGY
Payer: COMMERCIAL

## 2023-07-09 VITALS
DIASTOLIC BLOOD PRESSURE: 76 MMHG | RESPIRATION RATE: 16 BRPM | TEMPERATURE: 98.1 F | HEART RATE: 92 BPM | SYSTOLIC BLOOD PRESSURE: 119 MMHG

## 2023-07-09 PROBLEM — Z36.89 ENCOUNTER FOR TRIAGE IN PREGNANT PATIENT: Status: ACTIVE | Noted: 2023-07-09

## 2023-07-09 LAB
ALBUMIN UR-MCNC: NEGATIVE MG/DL
APPEARANCE UR: ABNORMAL
BACTERIA #/AREA URNS HPF: ABNORMAL /HPF
BILIRUB UR QL STRIP: NEGATIVE
COLOR UR AUTO: YELLOW
GLUCOSE UR STRIP-MCNC: NEGATIVE MG/DL
HGB UR QL STRIP: NEGATIVE
KETONES UR STRIP-MCNC: NEGATIVE MG/DL
LEUKOCYTE ESTERASE UR QL STRIP: ABNORMAL
NITRATE UR QL: NEGATIVE
PH UR STRIP: 7 [PH] (ref 5–7)
RBC URINE: <1 /HPF
SP GR UR STRIP: 1.02 (ref 1–1.03)
SQUAMOUS EPITHELIAL: 5 /HPF
UROBILINOGEN UR STRIP-MCNC: NORMAL MG/DL
WBC URINE: 2 /HPF

## 2023-07-09 PROCEDURE — 81001 URINALYSIS AUTO W/SCOPE: CPT | Performed by: OBSTETRICS & GYNECOLOGY

## 2023-07-09 PROCEDURE — G0463 HOSPITAL OUTPT CLINIC VISIT: HCPCS

## 2023-07-09 RX ORDER — LIDOCAINE 40 MG/G
CREAM TOPICAL
Status: DISCONTINUED | OUTPATIENT
Start: 2023-07-09 | End: 2023-07-09 | Stop reason: HOSPADM

## 2023-07-09 RX ORDER — ACETAMINOPHEN 325 MG/1
650 TABLET ORAL EVERY 4 HOURS PRN
Status: DISCONTINUED | OUTPATIENT
Start: 2023-07-09 | End: 2023-07-09 | Stop reason: HOSPADM

## 2023-07-09 ASSESSMENT — ACTIVITIES OF DAILY LIVING (ADL): ADLS_ACUITY_SCORE: 31

## 2023-07-09 NOTE — PROGRESS NOTES
Patient arrived to triage with reports of back and abdominal cramping that she rates as a 3/10. Cramping is constant, not rhythmic like contractions. Pain started this morning, was more intense midday, patient took a nap and states it felt slightly better but still bothering her. Has not tried tylenol.     [facility] in [place]. States this is her 7th pregnancy, has had 3 living children at full-term born vaginally, one child born early with fetal anomalies who is  (also born vaginally) and 2 miscarriages. States this pregnancy has been healthy without problems.     Urine collected for a UA.      Dr. Wheeler updated on patient status.

## 2023-07-10 NOTE — PLAN OF CARE
S: Discharge from triage  A: A:moderate variablility, + accels, no decels, Category I  no uterine activity  Strip reviewed by Kina Cahpman RN.  not examined  Admission on 2023   Component Date Value    Color Urine 2023 Yellow     Appearance Urine 2023 Slightly Cloudy (A)     Glucose Urine 2023 Negative     Bilirubin Urine 2023 Negative     Ketones Urine 2023 Negative     Specific Gravity Urine 2023 1.016     Blood Urine 2023 Negative     pH Urine 2023 7.0     Protein Albumin Urine 2023 Negative     Urobilinogen Urine 2023 Normal     Nitrite Urine 2023 Negative     Leukocyte Esterase Urine 2023 Small (A)     Bacteria Urine 2023 Few (A)     RBC Urine 2023 <1     WBC Urine 2023 2     Squamous Epithelials Uri* 2023 5 (H)      Dr. ALEX Wheeler informed of above and discharge order received.   R: Plan includes:  labor instuctions given Fetal kick count instructions given. Follow up clinic appointment. Patient instructed to report any recurrence of above concerns to her primary care provider during clinic hours or [facility] at any other time. Patient verbalized understanding of After Visit Summary, education and agreement with plan. Agrees to call for any problems, questions or concerns.  Discharged undelivered via ambulatory  in stable condition with all belongings.

## 2023-07-10 NOTE — PROGRESS NOTES
No infection present on UA. No contractions shown on monitor, reactive strip. Patient offered tylenol, declined. Patient opted to discharge to home instead of any further workup. Report given to Basilia HARRIS

## 2023-07-10 NOTE — DISCHARGE INSTRUCTIONS
Discharge Instruction for Undelivered Patients      You were seen for:  Abdominal and back pain/ cramping  We Consulted: Dr. Wheeler  You had (Test or Medicine): Urine analysis, fetal monitoring     Diet:   Drink 8 to 12 glasses of liquids (milk, juice, water) every day.  You may eat meals and snacks.     Activity:  Count fetal kicks everyday (see handout)  Call your doctor or nurse midwife if your baby is moving less than usual.     Call your provider if you notice:  Swelling in your face or increased swelling in your hands or legs.  Headaches that are not relieved by Tylenol (acetaminophen).  Changes in your vision (blurring: seeing spots or stars.)  Nausea (sick to your stomach) and vomiting (throwing up).   Weight gain of 5 pounds or more per week.  Heartburn that doesn't go away.  Signs of bladder infection: pain when you urinate (use the toilet), need to go more often and more urgently.  The bag of guerra (rupture of membranes) breaks, or you notice leaking in your underwear.  Bright red blood in your underwear.  Abdominal (lower belly) or stomach pain.  For first baby: Contractions (tightening) less than 5 minutes apart for one hour or more.  Second (plus) baby: Contractions (tightening) less than 10 minutes apart and getting stronger.  *If less than 34 weeks: Contractions (tightening) more than 6 times in one hour.  Increase or change in vaginal discharge (note the color and amount)  Other:     Follow-up:  As scheduled in the clinic

## 2023-07-13 ENCOUNTER — HOSPITAL ENCOUNTER (EMERGENCY)
Facility: CLINIC | Age: 33
Discharge: HOME | End: 2023-07-13
Attending: FAMILY MEDICINE | Admitting: FAMILY MEDICINE
Payer: COMMERCIAL

## 2023-07-13 VITALS
WEIGHT: 277.6 LBS | DIASTOLIC BLOOD PRESSURE: 69 MMHG | SYSTOLIC BLOOD PRESSURE: 107 MMHG | HEIGHT: 64 IN | RESPIRATION RATE: 16 BRPM | TEMPERATURE: 97.9 F | BODY MASS INDEX: 47.39 KG/M2 | OXYGEN SATURATION: 96 % | HEART RATE: 95 BPM

## 2023-07-13 DIAGNOSIS — S70.12XA CONTUSION OF LEFT THIGH, INITIAL ENCOUNTER: ICD-10-CM

## 2023-07-13 PROCEDURE — 99282 EMERGENCY DEPT VISIT SF MDM: CPT | Performed by: FAMILY MEDICINE

## 2023-07-13 PROCEDURE — 99283 EMERGENCY DEPT VISIT LOW MDM: CPT | Performed by: FAMILY MEDICINE

## 2023-07-13 ASSESSMENT — ACTIVITIES OF DAILY LIVING (ADL): ADLS_ACUITY_SCORE: 33

## 2023-07-13 NOTE — ED TRIAGE NOTES
"Pt presents with 2 concerns.     1) Pt is 31 weeks pregnant and feels dehydrated. Pt reports \"drinking and drinking water but it isn't matching up\" so she feels \"blah.\"  Pt denies vomiting/diarrhea. Reports urine is dark.    2) Pt has a live trap to trap a sick raccoon on her property. Pt observed raccoon around cage and eating. Pt moved cage to ground and felt like she had a superficial scrap to left outer thigh from cage. Pt did not have contact with animal, but is concerned about saliva contact since animal was eating around cage. Pt cleaned abrasion with soap and water. Reports superficial abrasion has since healed and isn't seen on thigh.      Triage Assessment     Row Name 07/13/23 1708       Triage Assessment (Adult)    Airway WDL WDL       Respiratory WDL    Respiratory WDL WDL       Skin Circulation/Temperature WDL    Skin Circulation/Temperature WDL WDL       Cardiac WDL    Cardiac WDL WDL       Peripheral/Neurovascular WDL    Peripheral Neurovascular WDL WDL       Cognitive/Neuro/Behavioral WDL    Cognitive/Neuro/Behavioral WDL WDL              "

## 2023-07-14 NOTE — ED PROVIDER NOTES
"  HPI   Patient is a 32-year-old female presenting with concerns about dehydration and possible rabies exposure.  She is known to be 31 weeks pregnant.  She has high risk mom with history of pregnancy with anencephaly.  Known history of anxiety and depression.  Known history of obesity.  No alcohol.  No drugs of abuse.  No marijuana.    The patient had made local police aware of a concerning raccoon.  The raccoon had recently looked sick and so it was going to be trapped by them.  They set up a trap in the patient's backyard.  She saw the raccoon eat the food place there but it was not trapped because the cage was placed too far away.  Therefore, later the patient went down and moved the trap closer to the bait.  As she did so she scraped her left thigh against the trap.  She took a picture of the scrape.  This occurred yesterday.  Also, she has been \"feeling blocked.\"  She thought it might be dehydration but she admits to drinking large amounts of water.  She tells me that her urine is dark.  She denies nausea or vomiting.  No diarrhea.  No hematochezia or melena.  No vaginal bleeding or discharge.  No dysuria, urgency, frequency of urination.  No fever.  No cough or congestion.  No chest pain or shortness of breath.  No severe headache.  No recent trauma or injury other than the above.      Allergies:  Allergies   Allergen Reactions    Buspirone Rash    Calamine Rash    Nickel Chloride  [Nickel] Rash     Problem List:    Patient Active Problem List    Diagnosis Date Noted    Encounter for triage in pregnant patient 07/09/2023     Priority: Medium    Polyp of colon 05/25/2022     Priority: Medium    Depressive disorder 04/02/2021     Priority: Medium    Seasonal allergies 07/01/2019     Priority: Medium    Anxiety 06/13/2018     Priority: Medium    Diverticulosis of large intestine 06/13/2018     Priority: Medium    Hemorrhoids, internal 06/13/2018     Priority: Medium    History of colonic polyps 06/13/2018     " "Priority: Medium    Morbid obesity with BMI of 40.0-44.9, adult (H) 01/28/2016     Priority: Medium    Prior fetal anencephaly in third trimester, antepartum 01/28/2016     Priority: Medium      Past Medical History:    No past medical history on file.  Past Surgical History:    No past surgical history on file.  Family History:    No family history on file.  Social History:  Marital Status:  Single [1]  Social History     Tobacco Use    Smoking status: Never    Smokeless tobacco: Never      Medications:    folic acid (FOLVITE) 1 MG tablet  Prenatal Vit-Fe Fumarate-FA (PRENATAL VITAMIN) 27-0.8 MG TABS      Review of Systems   All other systems reviewed and are negative.      PE   BP: 107/69  Pulse: 95  Temp: 97.9  F (36.6  C)  Resp: 16  Height: 162.6 cm (5' 4\")  Weight: 125.9 kg (277 lb 9.6 oz)  SpO2: 98 %  Physical Exam  Vitals reviewed.   Constitutional:       General: She is not in acute distress.     Appearance: She is well-developed. She is obese.   HENT:      Head: Normocephalic and atraumatic.      Right Ear: External ear normal.      Left Ear: External ear normal.      Nose: Nose normal.      Mouth/Throat:      Mouth: Mucous membranes are moist.      Pharynx: Oropharynx is clear.   Eyes:      Extraocular Movements: Extraocular movements intact.      Conjunctiva/sclera: Conjunctivae normal.      Pupils: Pupils are equal, round, and reactive to light.   Cardiovascular:      Rate and Rhythm: Normal rate and regular rhythm.   Pulmonary:      Effort: Pulmonary effort is normal.   Abdominal:      Palpations: Abdomen is soft.      Tenderness: There is no abdominal tenderness.   Musculoskeletal:         General: Normal range of motion.      Cervical back: Normal range of motion.   Skin:     General: Skin is warm and dry.      Comments: Mild tenderness over the left lateral thigh.  No open wound, scrape, or laceration.   Neurological:      Mental Status: She is alert and oriented to person, place, and time. " "  Psychiatric:         Behavior: Behavior normal.         ED COURSE and [facility]   1905.  Patient has symptoms and signs as described above.  Patient without open skin wound at the site of her thigh injury.  Mild tenderness present, perhaps an underlying contusion.  Picture reviewed and no scrape or open skin identified.  Very low concern for rabies exposure even if the animal had rabies.  The scrape happened by contact with the cage and there was no open skin.  Concern for dehydration is present, she does not want an IV.  She would rather continue to hydrate by mouth.  She says, \"my biggest concern was the rabies exposure.\"  She would like to be discharged home at this time.    Electronic medical chart reviewed, including medical problems, medications, medical allergies, social history.  Recent hospitalizations and surgical procedures reviewed.  Recent clinic visits and consultations reviewed.  Recent labs and test results reviewed.  Nursing notes reviewed.    The patient, their parent if applicable, and/or their medical decision maker(s) and I have reviewed all of the available historical information, applicable PMH, physical exam findings, and objective diagnostic data gathered during this ED visit.  We then discussed all work-up options and then together agreed upon the course taken during this visit.  The ultimate disposition and plan was a cooperative decision made between myself and the patient, their parent if applicable, and/or their legal decision maker(s).  The risks and benefits of all decisions made during this visit were discussed to the best of my abilities given the circumstances, and all parties are understanding of the pertinent ramifications of these decisions.      LABS  Labs Ordered and Resulted from Time of ED Arrival to Time of ED Departure - No data to display    IMAGING  Images reviewed by me.  Radiology report also reviewed.  No orders to display       Procedures    Medications - No data to " display      IMPRESSION       ICD-10-CM    1. Contusion of left thigh, initial encounter  S70.12XA                Medication List      There are no discharge medications for this visit.                     Taz Mcmanus MD  07/13/23 1907

## 2023-07-14 NOTE — DISCHARGE INSTRUCTIONS
RETURN TO THE EMERGENCY ROOM FOR THE FOLLOWING:    Severely worsened pain, expanding redness/swelling/tenderness, fever greater than 101, or at anytime for any concern.    FOLLOW UP:    With your primary clinic only as needed.    TREATMENT RECOMMENDATIONS:    Increase oral fluids as discussed.    NURSE ADVICE LINE:  [phone] or [phone]

## 2023-07-24 ENCOUNTER — APPOINTMENT (OUTPATIENT)
Dept: CT IMAGING | Facility: CLINIC | Age: 33
End: 2023-07-24
Attending: FAMILY MEDICINE
Payer: COMMERCIAL

## 2023-07-24 ENCOUNTER — HOSPITAL ENCOUNTER (EMERGENCY)
Facility: CLINIC | Age: 33
Discharge: HOME | End: 2023-07-24
Attending: FAMILY MEDICINE | Admitting: FAMILY MEDICINE
Payer: COMMERCIAL

## 2023-07-24 VITALS
DIASTOLIC BLOOD PRESSURE: 71 MMHG | SYSTOLIC BLOOD PRESSURE: 109 MMHG | HEART RATE: 75 BPM | HEIGHT: 64 IN | WEIGHT: 277 LBS | BODY MASS INDEX: 47.29 KG/M2 | TEMPERATURE: 96.8 F | RESPIRATION RATE: 11 BRPM | OXYGEN SATURATION: 97 %

## 2023-07-24 DIAGNOSIS — R06.00 DYSPNEA, UNSPECIFIED TYPE: ICD-10-CM

## 2023-07-24 DIAGNOSIS — R07.9 CHEST PAIN, UNSPECIFIED TYPE: ICD-10-CM

## 2023-07-24 LAB
ALBUMIN SERPL BCG-MCNC: 3.4 G/DL (ref 3.5–5.2)
ALP SERPL-CCNC: 116 U/L (ref 35–104)
ALT SERPL W P-5'-P-CCNC: 8 U/L (ref 0–50)
ANION GAP SERPL CALCULATED.3IONS-SCNC: 13 MMOL/L (ref 7–15)
AST SERPL W P-5'-P-CCNC: 14 U/L (ref 0–45)
BASOPHILS # BLD AUTO: 0 10E3/UL (ref 0–0.2)
BASOPHILS NFR BLD AUTO: 0 %
BILIRUB SERPL-MCNC: 0.2 MG/DL
BUN SERPL-MCNC: 6.3 MG/DL (ref 6–20)
CALCIUM SERPL-MCNC: 9.6 MG/DL (ref 8.6–10)
CHLORIDE SERPL-SCNC: 104 MMOL/L (ref 98–107)
CREAT SERPL-MCNC: 0.65 MG/DL (ref 0.51–0.95)
D DIMER PPP FEU-MCNC: 1.3 UG/ML FEU (ref 0–0.5)
DEPRECATED HCO3 PLAS-SCNC: 20 MMOL/L (ref 22–29)
EOSINOPHIL # BLD AUTO: 0.2 10E3/UL (ref 0–0.7)
EOSINOPHIL NFR BLD AUTO: 2 %
ERYTHROCYTE [DISTWIDTH] IN BLOOD BY AUTOMATED COUNT: 14 % (ref 10–15)
GFR SERPL CREATININE-BSD FRML MDRD: >90 ML/MIN/1.73M2
GLUCOSE SERPL-MCNC: 103 MG/DL (ref 70–99)
HCT VFR BLD AUTO: 36 % (ref 35–47)
HGB BLD-MCNC: 12.1 G/DL (ref 11.7–15.7)
HOLD SPECIMEN: NORMAL
HOLD SPECIMEN: NORMAL
IMM GRANULOCYTES # BLD: 0.1 10E3/UL
IMM GRANULOCYTES NFR BLD: 0 %
LIPASE SERPL-CCNC: 17 U/L (ref 13–60)
LYMPHOCYTES # BLD AUTO: 2.4 10E3/UL (ref 0.8–5.3)
LYMPHOCYTES NFR BLD AUTO: 18 %
MCH RBC QN AUTO: 28.7 PG (ref 26.5–33)
MCHC RBC AUTO-ENTMCNC: 33.6 G/DL (ref 31.5–36.5)
MCV RBC AUTO: 86 FL (ref 78–100)
MONOCYTES # BLD AUTO: 0.7 10E3/UL (ref 0–1.3)
MONOCYTES NFR BLD AUTO: 5 %
NEUTROPHILS # BLD AUTO: 10.1 10E3/UL (ref 1.6–8.3)
NEUTROPHILS NFR BLD AUTO: 75 %
NRBC # BLD AUTO: 0 10E3/UL
NRBC BLD AUTO-RTO: 0 /100
PLATELET # BLD AUTO: 269 10E3/UL (ref 150–450)
POTASSIUM SERPL-SCNC: 4.2 MMOL/L (ref 3.4–5.3)
PROT SERPL-MCNC: 6.9 G/DL (ref 6.4–8.3)
RBC # BLD AUTO: 4.21 10E6/UL (ref 3.8–5.2)
SODIUM SERPL-SCNC: 137 MMOL/L (ref 136–145)
TROPONIN T SERPL HS-MCNC: <6 NG/L
WBC # BLD AUTO: 13.5 10E3/UL (ref 4–11)

## 2023-07-24 PROCEDURE — 250N000009 HC RX 250: Performed by: FAMILY MEDICINE

## 2023-07-24 PROCEDURE — 80053 COMPREHEN METABOLIC PANEL: CPT | Performed by: FAMILY MEDICINE

## 2023-07-24 PROCEDURE — 85004 AUTOMATED DIFF WBC COUNT: CPT | Performed by: FAMILY MEDICINE

## 2023-07-24 PROCEDURE — 99285 EMERGENCY DEPT VISIT HI MDM: CPT | Mod: 25

## 2023-07-24 PROCEDURE — 93005 ELECTROCARDIOGRAM TRACING: CPT

## 2023-07-24 PROCEDURE — 85379 FIBRIN DEGRADATION QUANT: CPT | Performed by: FAMILY MEDICINE

## 2023-07-24 PROCEDURE — 71275 CT ANGIOGRAPHY CHEST: CPT

## 2023-07-24 PROCEDURE — 84484 ASSAY OF TROPONIN QUANT: CPT | Performed by: FAMILY MEDICINE

## 2023-07-24 PROCEDURE — 36415 COLL VENOUS BLD VENIPUNCTURE: CPT | Performed by: FAMILY MEDICINE

## 2023-07-24 PROCEDURE — 83690 ASSAY OF LIPASE: CPT | Performed by: FAMILY MEDICINE

## 2023-07-24 PROCEDURE — 99284 EMERGENCY DEPT VISIT MOD MDM: CPT | Mod: 25 | Performed by: FAMILY MEDICINE

## 2023-07-24 PROCEDURE — 250N000011 HC RX IP 250 OP 636: Performed by: FAMILY MEDICINE

## 2023-07-24 PROCEDURE — 93010 ELECTROCARDIOGRAM REPORT: CPT | Performed by: FAMILY MEDICINE

## 2023-07-24 RX ORDER — IOPAMIDOL 755 MG/ML
89 INJECTION, SOLUTION INTRAVASCULAR ONCE
Status: COMPLETED | OUTPATIENT
Start: 2023-07-24 | End: 2023-07-24

## 2023-07-24 RX ADMIN — IOPAMIDOL 89 ML: 755 INJECTION, SOLUTION INTRAVENOUS at 20:09

## 2023-07-24 RX ADMIN — SODIUM CHLORIDE 100 ML: 9 INJECTION, SOLUTION INTRAVENOUS at 20:10

## 2023-07-24 ASSESSMENT — ACTIVITIES OF DAILY LIVING (ADL)
ADLS_ACUITY_SCORE: 35
ADLS_ACUITY_SCORE: 35

## 2023-07-24 NOTE — ED TRIAGE NOTES
Chest tightness started a few hours ago, also c/o SOA and headache. Pt pregnant, denies any contractions or leaking of fluids. Denies changes in fetal movement.      Triage Assessment       Row Name 07/24/23 2360       Triage Assessment (Adult)    Airway WDL WDL       Respiratory WDL    Respiratory WDL X;rhythm/pattern    Rhythm/Pattern, Respiratory shortness of breath       Skin Circulation/Temperature WDL    Skin Circulation/Temperature WDL WDL       Cardiac WDL    Cardiac WDL X;chest pain       Chest Pain Assessment    Chest Pain Location midsternal    Character tightness;pressure    Alleviating Factors rest       Peripheral/Neurovascular WDL    Peripheral Neurovascular WDL WDL       Cognitive/Neuro/Behavioral WDL    Cognitive/Neuro/Behavioral WDL WDL                     Reviewed and referred to chart.    · Chief Complaint: The patient is a 66y Male complaining of see chief complaint quote.  · HPI Objective Statement: 67 yo male with a pmh of DM, parkinson's, and dementia present from [facility] for multiple complaints. pt states he was sent because he was refusing to take 2 doses of his medications. he states to experienced visual hallucinations for over 2 months but denies any SI/HI. denies any other symptoms including fevers, chill, headache, recent illness/travel, cough, abdominal pain, chest pain, or SOB. paperwork from [facility] states pt has experiencing hallucinations, paranoia, word salad, self harm, aggression toward staff, and episodes of tremors w/ eyes rolling upward since March that worsened over the last 2 months.    Psych eval:     Patient is observed to be calm, cooperative, no agitation or aggression noted. Patient unaware of why he was brought to the ED today. Admits to having issues with memory. "states I forget sometime" Patient states staff at [facility] play tricks on him sometimes and sometimes he gets upset. Patient states he sees cats at times but they tell him the cats aren't there. Denies hallucinations during evaluation. Denies feeling depressed or anxious. States he feels good. States appetite is good but the food at [facility] doesn't taste that good. No evidence of depression or anxiety noted. No suicidal/homicidal ideations.     Patient is alert oriented to person, place, not to time and situation. Thought process consist of paranoid ideations as described and admits to visual hallucination at times. Does not appear to be a danger to himself or others at this time.  Insight/Judgement limited.    Collaterals: Mo Brian head nurse at [facility] [phone], [phone] patient used to have a roommate. Due to COVID patient is now in a room by his self and isolated most of the day. Mo Brain patient started to present with AMS and behavioral issues after this change. States patient has been increasingly agitated, experiencing hallucinations, periods of delusional behavior, refusing meds at times. No suicidal/homicidal ideations, no suicide attempts. States today patients agitation and aggression increased so they sent patient out to ED to be evaluated. [facility] states that patient recently started on Seroquel 25mg daily on May 4th.    Case discussed with MD Recinos    Dx Dementia with Behavioral Disturbance    No need or benefit from IPP  R/O any medical issues that may contribute to AMS  Patient can be discharged back to [facility] once cleared medically for discharge  Recommend Seroquel 25mg BID PRN  Continue to encourage compliance. Reviewed and referred to chart.    · Chief Complaint: The patient is a 66y Male complaining of see chief complaint quote.  · HPI Objective Statement: 65 yo male with a pmh of DM, parkinson's, and dementia present from [facility] for multiple complaints. pt states he was sent because he was refusing to take 2 doses of his medications. he states to experienced visual hallucinations for over 2 months but denies any SI/HI. denies any other symptoms including fevers, chill, headache, recent illness/travel, cough, abdominal pain, chest pain, or SOB. paperwork from [facility] states pt has experiencing hallucinations, paranoia, word salad, self harm, aggression toward staff, and episodes of tremors w/ eyes rolling upward since March that worsened over the last 2 months.    Psych eval:     Patient is observed to be calm, cooperative, no agitation or aggression noted. Patient unaware of why he was brought to the ED today. Admits to having issues with memory. "states I forget sometime" Patient states staff at [facility] play tricks on him sometimes and sometimes he gets upset. Patient states he sees cats at times but they tell him the cats aren't there. Denies hallucinations during evaluation. Denies feeling depressed or anxious. States he feels good. States appetite is good but the food at [facility] doesn't taste that good. No evidence of depression or anxiety noted. No suicidal/homicidal ideations.     Patient is alert oriented to person, place, not to time and situation. Thought process consist of paranoid ideations as described and admits to visual hallucination at times. Does not appear to be a danger to himself or others at this time.  Insight/Judgement limited.    Collaterals: Mo Brian head nurse at [facility] [phone], [phone] patient used to have a roommate. Due to COVID patient is now in a room by his self and isolated most of the day. Mo Brian patient started to present with AMS and behavioral issues after this change. States patient has been increasingly agitated, experiencing hallucinations, periods of delusional behavior, refusing meds at times. No suicidal/homicidal ideations, no suicide attempts. States today patients agitation and aggression increased so they sent patient out to ED to be evaluated. [facility] states that patient recently started on Seroquel 25mg daily on May 4th.    Case discussed with MD Recinos    Dx Neurocognitive Disorder with Behavioral Disturbance    No need or benefit from IPP  R/O any medical issues that may contribute to AMS  Patient can be discharged back to [facility] once cleared medically for discharge  Recommend Seroquel 25mg BID PRN  Continue to encourage compliance.

## 2023-07-25 NOTE — ED NOTES
A few hours ago, developed chest pressure and a headache. Took tylneol at home with some relief. SOB with chest pain when the pain comes in waves. Pregnancy #5. No prior medical HX

## 2023-07-25 NOTE — DISCHARGE INSTRUCTIONS
Reassuring work-up today with respect to the potential serious explanations for chest pain and shortness of air as we discussed.  Please keep a close eye on symptoms given your current pregnancy, return to the emergency department if worse or changes.  Please follow-up for prenatal care as planned.

## 2023-07-25 NOTE — ED PROVIDER NOTES
"  History     Chief Complaint   Patient presents with    Chest Pain     Chest tightness started a few hours ago, also c/o SOA and headache.     HPI  Huong Meredith is a 33 year old female , past medical history is significant for depression, seasonal allergies, anxiety, diverticulosis, internal hemorrhoids, morbid obesity, G5, P4 at 33+ week gestation who presents now with concerns of central chest tightness heaviness as well as shortness of air beginning about 3 to 4 hours prior to presentation.  History is obtained with the patient who states that she was seated watching TV with her kids when she began to have tightness in her chest that waxed and waned, felt short of breath as well.  It was still present when she had her EKG done but it seems to have almost subsided at this point.  She can recall no trauma or injury.  There was no heavy lifting or straining bending etc.  She has never had chest discomfort like this before.  She is concerned she might have a blood clot.  He also notes an occipital headache that she describes as a \"tension headache\" beginning around the same time which seems to have improved.  She denies any recent illness however has noted increased runny nose of late.      Allergies:  Allergies   Allergen Reactions    Miconazole     Adhesive Tape Itching and Rash    Blood-Group Specific Substance Unknown     Patient has Probable Passive Anti-D.  Blood Product orders may be delayed.  Draw one red top and two purple top tubes for ALL Type and Screen/ Type and Crossmatch orders.    Buspirone Rash    Calamine Rash    Nickel Chloride  [Nickel] Rash       Problem List:    Patient Active Problem List    Diagnosis Date Noted    Encounter for triage in pregnant patient 07/09/2023     Priority: Medium    Polyp of colon 05/25/2022     Priority: Medium    Depressive disorder 04/02/2021     Priority: Medium    Seasonal allergies 07/01/2019     Priority: Medium    Anxiety 06/13/2018     Priority: Medium    " "Diverticulosis of large intestine 06/13/2018     Priority: Medium    Hemorrhoids, internal 06/13/2018     Priority: Medium    History of colonic polyps 06/13/2018     Priority: Medium    Morbid obesity with BMI of 40.0-44.9, adult (H) 01/28/2016     Priority: Medium    Prior fetal anencephaly in third trimester, antepartum 01/28/2016     Priority: Medium        Past Medical History:    No past medical history on file.    Past Surgical History:    No past surgical history on file.    Family History:    No family history on file.    Social History:  Marital Status:  Single [1]  Social History     Tobacco Use    Smoking status: Never    Smokeless tobacco: Never        Medications:    folic acid (FOLVITE) 1 MG tablet  Prenatal Vit-Fe Fumarate-FA (PRENATAL VITAMIN) 27-0.8 MG TABS          Review of Systems   All other systems reviewed and are negative.      Physical Exam   BP: 103/73  Pulse: 92  Temp: 96.8  F (36  C)  Resp: 20  Height: 162.6 cm (5' 4\")  Weight: 125.6 kg (277 lb)  SpO2: 96 %      Physical Exam  Vitals and nursing note reviewed.   Constitutional:       General: She is not in acute distress.     Appearance: She is well-developed. She is not ill-appearing.   HENT:      Head: Normocephalic and atraumatic.   Eyes:      Extraocular Movements: Extraocular movements intact.      Pupils: Pupils are equal, round, and reactive to light.   Cardiovascular:      Rate and Rhythm: Normal rate and regular rhythm.      Heart sounds: Normal heart sounds.   Pulmonary:      Effort: Pulmonary effort is normal.      Breath sounds: Normal breath sounds.   Abdominal:      General: Bowel sounds are normal.      Palpations: Abdomen is soft.      Comments: The uterus is palpable in the upper abdomen, soft, nontender, fetal movements are appreciated.   Musculoskeletal:         General: Normal range of motion.      Cervical back: Normal range of motion and neck supple.   Skin:     General: Skin is warm and dry.      Capillary Refill: " Capillary refill takes less than 2 seconds.   Neurological:      General: No focal deficit present.      Mental Status: She is alert.   Psychiatric:         Mood and Affect: Mood normal.         Behavior: Behavior normal.         ED Course                 Procedures              EKG Interpretation:      Interpreted by Darrel Barrios MD  Time reviewed: Time obtained 5:42 PM time interpreted same.  84 bpm sinus rhythm normal axis, normal intervals, no acute ST-T wave changes.  Impression normal EKG.    Results for orders placed or performed during the hospital encounter of 07/24/23 (from the past 24 hour(s))   [place] Draw    Narrative    The following orders were created for panel order [place] Draw.  Procedure                               Abnormality         Status                     ---------                               -----------         ------                     Extra Green Top (Lithium...[[id number]]                      Final result               Extra Purple Top Tube[[id number]]                            Final result                 Please view results for these tests on the individual orders.   Extra Green Top (Lithium Heparin) Tube   Result Value Ref Range    Hold Specimen JIC    Extra Purple Top Tube   Result Value Ref Range    Hold Specimen JIC    CBC with platelets, differential    Narrative    The following orders were created for panel order CBC with platelets, differential.  Procedure                               Abnormality         Status                     ---------                               -----------         ------                     CBC with platelets and d...[[id number]]  Abnormal            Final result                 Please view results for these tests on the individual orders.   Comprehensive metabolic panel   Result Value Ref Range    Sodium 137 136 - 145 mmol/L    Potassium 4.2 3.4 - 5.3 mmol/L    Chloride 104 98 - 107 mmol/L    Carbon Dioxide (CO2) 20 (L) 22 - 29 mmol/L     Anion Gap 13 7 - 15 mmol/L    Urea Nitrogen 6.3 6.0 - 20.0 mg/dL    Creatinine 0.65 0.51 - 0.95 mg/dL    Calcium 9.6 8.6 - 10.0 mg/dL    Glucose 103 (H) 70 - 99 mg/dL    Alkaline Phosphatase 116 (H) 35 - 104 U/L    AST 14 0 - 45 U/L    ALT 8 0 - 50 U/L    Protein Total 6.9 6.4 - 8.3 g/dL    Albumin 3.4 (L) 3.5 - 5.2 g/dL    Bilirubin Total 0.2 <=1.2 mg/dL    GFR Estimate >90 >60 mL/min/1.73m2   Lipase   Result Value Ref Range    Lipase 17 13 - 60 U/L   Troponin T, High Sensitivity   Result Value Ref Range    Troponin T, High Sensitivity <6 <=14 ng/L   CBC with platelets and differential   Result Value Ref Range    WBC Count 13.5 (H) 4.0 - 11.0 10e3/uL    RBC Count 4.21 3.80 - 5.20 10e6/uL    Hemoglobin 12.1 11.7 - 15.7 g/dL    Hematocrit 36.0 35.0 - 47.0 %    MCV 86 78 - 100 fL    MCH 28.7 26.5 - 33.0 pg    MCHC 33.6 31.5 - 36.5 g/dL    RDW 14.0 10.0 - 15.0 %    Platelet Count 269 150 - 450 10e3/uL    % Neutrophils 75 %    % Lymphocytes 18 %    % Monocytes 5 %    % Eosinophils 2 %    % Basophils 0 %    % Immature Granulocytes 0 %    NRBCs per 100 WBC 0 <1 /100    Absolute Neutrophils 10.1 (H) 1.6 - 8.3 10e3/uL    Absolute Lymphocytes 2.4 0.8 - 5.3 10e3/uL    Absolute Monocytes 0.7 0.0 - 1.3 10e3/uL    Absolute Eosinophils 0.2 0.0 - 0.7 10e3/uL    Absolute Basophils 0.0 0.0 - 0.2 10e3/uL    Absolute Immature Granulocytes 0.1 <=0.4 10e3/uL    Absolute NRBCs 0.0 10e3/uL   D dimer quantitative   Result Value Ref Range    D-Dimer Quantitative 1.30 (H) 0.00 - 0.50 ug/mL FEU    Narrative    This D-dimer assay is intended for use in conjunction with a clinical pretest probability assessment model to exclude pulmonary embolism (PE) and deep venous thrombosis (DVT) in outpatients suspected of PE or DVT. The cut-off value is 0.50 ug/mL FEU.   CT Chest Pulmonary Embolism w Contrast    Narrative    EXAM: CT CHEST PULMONARY EMBOLISM W CONTRAST  LOCATION: [facility]  DATE: 7/24/2023    INDICATION:  Pain; SOA,Chest heaviness,elevated DDimer. 33 + week gestation. R O PE; Female sex; Pregnant; No DVT symptoms; No imaging to r o PE in the last 24 hours; Chest x ray not available  COMPARISON: None.  TECHNIQUE: CT chest pulmonary angiogram during arterial phase injection of IV contrast. Multiplanar reformats and MIP reconstructions were performed. Dose reduction techniques were used.   CONTRAST: 89 ml Isovue 370    FINDINGS:  ANGIOGRAM CHEST: Pulmonary arteries are normal caliber and negative for central pulmonary emboli. Bolus timing makes complete exclusion of segmental and subsegmental emboli difficult, although none are definitively visualized. Thoracic aorta is negative   for dissection. No CT evidence of right heart strain.    LUNGS AND PLEURA: No focal airspace consolidation or pleural effusion.    MEDIASTINUM/AXILLAE: No suspicious lymphadenopathy.    CORONARY ARTERY CALCIFICATION: None.    UPPER ABDOMEN: Normal.    MUSCULOSKELETAL: No acute bony abnormality.      Impression    IMPRESSION:  1.  No evidence of pulmonary embolus or other acute abnormality in the chest.       Medications   iopamidol (ISOVUE-370) solution 89 mL (89 mLs Intravenous $Given 7/24/23 2009)   sodium chloride 0.9 % bag 500 mL for CT scan flush use (100 mLs Intravenous $Given 7/24/23 2010)   9:03 PM  Reviewed lab diagnostics as well as CT imaging.  Reassuring diagnostic evaluation with negative chest CT PE study.  No evidence for pulmonary embolism, atypical pneumonia, pneumothorax, vascular abnormality.  EKG was normal and discussed with the patient.  The D-dimer was elevated and the interpretation of this study was reviewed in the room with the patient before discussing CT chest PE.  We discussed that considering differential diagnostic considerations for her entrance complaint her evaluation has been comprehensive and we have not found any potential serious underlying etiology.  I suspect this relates to physical deconditioning,  anxiety, gravid state.  I have requested that the patient follow-up with her primary care provider and return to the emergency department if worse or changes.        Assessments & Plan (with Medical Decision Making)   Assessments and plan with medical decision making at the time stamp above.    Disclaimer: This note consists of symbols derived from keyboarding, dictation and/or voice recognition software. As a result, there may be errors in the script that have gone undetected. Please consider this when interpreting information found in this chart.      I have reviewed the nursing notes.    I have reviewed the findings, diagnosis, plan and need for follow up with the patient.        New Prescriptions    No medications on file       Final diagnoses:   Chest pain, unspecified type - Noncardiac   Dyspnea, unspecified type       7/24/2023   [facility] EMERGENCY DEPT       Darrel Barrios MD  07/24/23 9674

## 2023-08-17 ENCOUNTER — LAB REQUISITION (OUTPATIENT)
Dept: LAB | Facility: CLINIC | Age: 33
End: 2023-08-17
Payer: COMMERCIAL

## 2023-08-17 DIAGNOSIS — O99.019 ANEMIA COMPLICATING PREGNANCY, UNSPECIFIED TRIMESTER: ICD-10-CM

## 2023-08-17 DIAGNOSIS — H53.9 UNSPECIFIED VISUAL DISTURBANCE: ICD-10-CM

## 2023-08-17 DIAGNOSIS — Z36.0 ENCOUNTER FOR ANTENATAL SCREENING FOR CHROMOSOMAL ANOMALIES: ICD-10-CM

## 2023-08-17 LAB
ALBUMIN MFR UR ELPH: 23.5 MG/DL
ALT SERPL W P-5'-P-CCNC: 6 U/L (ref 0–50)
AST SERPL W P-5'-P-CCNC: 21 U/L (ref 0–45)
BASOPHILS # BLD AUTO: 0 10E3/UL (ref 0–0.2)
BASOPHILS NFR BLD AUTO: 0 %
CREAT SERPL-MCNC: 0.7 MG/DL (ref 0.51–0.95)
CREAT UR-MCNC: 280 MG/DL
EOSINOPHIL # BLD AUTO: 0.2 10E3/UL (ref 0–0.7)
EOSINOPHIL NFR BLD AUTO: 2 %
ERYTHROCYTE [DISTWIDTH] IN BLOOD BY AUTOMATED COUNT: 14.2 % (ref 10–15)
FERRITIN SERPL-MCNC: 20 NG/ML (ref 6–175)
GFR SERPL CREATININE-BSD FRML MDRD: >90 ML/MIN/1.73M2
HCT VFR BLD AUTO: 38.3 % (ref 35–47)
HGB BLD-MCNC: 12.1 G/DL (ref 11.7–15.7)
IMM GRANULOCYTES # BLD: 0.1 10E3/UL
IMM GRANULOCYTES NFR BLD: 1 %
LYMPHOCYTES # BLD AUTO: 2.5 10E3/UL (ref 0.8–5.3)
LYMPHOCYTES NFR BLD AUTO: 18 %
MCH RBC QN AUTO: 28.1 PG (ref 26.5–33)
MCHC RBC AUTO-ENTMCNC: 31.6 G/DL (ref 31.5–36.5)
MCV RBC AUTO: 89 FL (ref 78–100)
MONOCYTES # BLD AUTO: 0.8 10E3/UL (ref 0–1.3)
MONOCYTES NFR BLD AUTO: 6 %
NEUTROPHILS # BLD AUTO: 10.5 10E3/UL (ref 1.6–8.3)
NEUTROPHILS NFR BLD AUTO: 73 %
NRBC # BLD AUTO: 0 10E3/UL
NRBC BLD AUTO-RTO: 0 /100
PLATELET # BLD AUTO: 298 10E3/UL (ref 150–450)
PROT/CREAT 24H UR: 0.08 MG/MG CR (ref 0–0.2)
RBC # BLD AUTO: 4.31 10E6/UL (ref 3.8–5.2)
WBC # BLD AUTO: 14.1 10E3/UL (ref 4–11)

## 2023-08-17 PROCEDURE — 84156 ASSAY OF PROTEIN URINE: CPT | Mod: ORL | Performed by: NURSE PRACTITIONER

## 2023-08-17 PROCEDURE — 82728 ASSAY OF FERRITIN: CPT | Mod: ORL | Performed by: NURSE PRACTITIONER

## 2023-08-17 PROCEDURE — 87653 STREP B DNA AMP PROBE: CPT | Mod: ORL | Performed by: NURSE PRACTITIONER

## 2023-08-17 PROCEDURE — 84460 ALANINE AMINO (ALT) (SGPT): CPT | Mod: ORL | Performed by: NURSE PRACTITIONER

## 2023-08-17 PROCEDURE — 85025 COMPLETE CBC W/AUTO DIFF WBC: CPT | Mod: ORL | Performed by: NURSE PRACTITIONER

## 2023-08-17 PROCEDURE — 84450 TRANSFERASE (AST) (SGOT): CPT | Mod: ORL | Performed by: NURSE PRACTITIONER

## 2023-08-17 PROCEDURE — 82565 ASSAY OF CREATININE: CPT | Mod: ORL | Performed by: NURSE PRACTITIONER

## 2023-08-18 LAB — GP B STREP DNA SPEC QL NAA+PROBE: NEGATIVE

## 2023-09-10 ENCOUNTER — HOSPITAL ENCOUNTER (EMERGENCY)
Facility: CLINIC | Age: 33
Discharge: HOME | End: 2023-09-11
Attending: STUDENT IN AN ORGANIZED HEALTH CARE EDUCATION/TRAINING PROGRAM | Admitting: STUDENT IN AN ORGANIZED HEALTH CARE EDUCATION/TRAINING PROGRAM
Payer: COMMERCIAL

## 2023-09-10 ENCOUNTER — APPOINTMENT (OUTPATIENT)
Dept: ULTRASOUND IMAGING | Facility: CLINIC | Age: 33
End: 2023-09-10
Attending: STUDENT IN AN ORGANIZED HEALTH CARE EDUCATION/TRAINING PROGRAM
Payer: COMMERCIAL

## 2023-09-10 DIAGNOSIS — R07.89 ATYPICAL CHEST PAIN: ICD-10-CM

## 2023-09-10 DIAGNOSIS — M79.89 RIGHT LEG SWELLING: ICD-10-CM

## 2023-09-10 LAB
BASOPHILS # BLD AUTO: 0.1 10E3/UL (ref 0–0.2)
BASOPHILS NFR BLD AUTO: 1 %
EOSINOPHIL # BLD AUTO: 0.2 10E3/UL (ref 0–0.7)
EOSINOPHIL NFR BLD AUTO: 2 %
ERYTHROCYTE [DISTWIDTH] IN BLOOD BY AUTOMATED COUNT: 14 % (ref 10–15)
HCT VFR BLD AUTO: 35.3 % (ref 35–47)
HGB BLD-MCNC: 11.8 G/DL (ref 11.7–15.7)
IMM GRANULOCYTES # BLD: 0 10E3/UL
IMM GRANULOCYTES NFR BLD: 0 %
LYMPHOCYTES # BLD AUTO: 3.2 10E3/UL (ref 0.8–5.3)
LYMPHOCYTES NFR BLD AUTO: 29 %
MCH RBC QN AUTO: 28.8 PG (ref 26.5–33)
MCHC RBC AUTO-ENTMCNC: 33.4 G/DL (ref 31.5–36.5)
MCV RBC AUTO: 86 FL (ref 78–100)
MONOCYTES # BLD AUTO: 0.6 10E3/UL (ref 0–1.3)
MONOCYTES NFR BLD AUTO: 5 %
NEUTROPHILS # BLD AUTO: 7 10E3/UL (ref 1.6–8.3)
NEUTROPHILS NFR BLD AUTO: 63 %
NRBC # BLD AUTO: 0 10E3/UL
NRBC BLD AUTO-RTO: 0 /100
PLATELET # BLD AUTO: 271 10E3/UL (ref 150–450)
RBC # BLD AUTO: 4.1 10E6/UL (ref 3.8–5.2)
WBC # BLD AUTO: 11.1 10E3/UL (ref 4–11)

## 2023-09-10 PROCEDURE — 99285 EMERGENCY DEPT VISIT HI MDM: CPT | Mod: 25 | Performed by: STUDENT IN AN ORGANIZED HEALTH CARE EDUCATION/TRAINING PROGRAM

## 2023-09-10 PROCEDURE — 85379 FIBRIN DEGRADATION QUANT: CPT | Performed by: STUDENT IN AN ORGANIZED HEALTH CARE EDUCATION/TRAINING PROGRAM

## 2023-09-10 PROCEDURE — 93005 ELECTROCARDIOGRAM TRACING: CPT | Performed by: STUDENT IN AN ORGANIZED HEALTH CARE EDUCATION/TRAINING PROGRAM

## 2023-09-10 PROCEDURE — 93010 ELECTROCARDIOGRAM REPORT: CPT | Performed by: STUDENT IN AN ORGANIZED HEALTH CARE EDUCATION/TRAINING PROGRAM

## 2023-09-10 PROCEDURE — 250N000011 HC RX IP 250 OP 636: Mod: JZ | Performed by: STUDENT IN AN ORGANIZED HEALTH CARE EDUCATION/TRAINING PROGRAM

## 2023-09-10 PROCEDURE — 99284 EMERGENCY DEPT VISIT MOD MDM: CPT | Mod: 25 | Performed by: STUDENT IN AN ORGANIZED HEALTH CARE EDUCATION/TRAINING PROGRAM

## 2023-09-10 PROCEDURE — 83690 ASSAY OF LIPASE: CPT | Performed by: STUDENT IN AN ORGANIZED HEALTH CARE EDUCATION/TRAINING PROGRAM

## 2023-09-10 PROCEDURE — 36415 COLL VENOUS BLD VENIPUNCTURE: CPT | Performed by: STUDENT IN AN ORGANIZED HEALTH CARE EDUCATION/TRAINING PROGRAM

## 2023-09-10 PROCEDURE — 80053 COMPREHEN METABOLIC PANEL: CPT | Performed by: STUDENT IN AN ORGANIZED HEALTH CARE EDUCATION/TRAINING PROGRAM

## 2023-09-10 PROCEDURE — 84484 ASSAY OF TROPONIN QUANT: CPT | Performed by: STUDENT IN AN ORGANIZED HEALTH CARE EDUCATION/TRAINING PROGRAM

## 2023-09-10 PROCEDURE — 85025 COMPLETE CBC W/AUTO DIFF WBC: CPT | Performed by: STUDENT IN AN ORGANIZED HEALTH CARE EDUCATION/TRAINING PROGRAM

## 2023-09-10 PROCEDURE — 96374 THER/PROPH/DIAG INJ IV PUSH: CPT | Mod: 59 | Performed by: STUDENT IN AN ORGANIZED HEALTH CARE EDUCATION/TRAINING PROGRAM

## 2023-09-10 PROCEDURE — 250N000013 HC RX MED GY IP 250 OP 250 PS 637: Performed by: STUDENT IN AN ORGANIZED HEALTH CARE EDUCATION/TRAINING PROGRAM

## 2023-09-10 PROCEDURE — 93971 EXTREMITY STUDY: CPT | Mod: RT

## 2023-09-10 RX ORDER — MAGNESIUM HYDROXIDE/ALUMINUM HYDROXICE/SIMETHICONE 120; 1200; 1200 MG/30ML; MG/30ML; MG/30ML
15 SUSPENSION ORAL ONCE
Status: COMPLETED | OUTPATIENT
Start: 2023-09-10 | End: 2023-09-10

## 2023-09-10 RX ORDER — KETOROLAC TROMETHAMINE 15 MG/ML
15 INJECTION, SOLUTION INTRAMUSCULAR; INTRAVENOUS ONCE
Status: COMPLETED | OUTPATIENT
Start: 2023-09-10 | End: 2023-09-10

## 2023-09-10 RX ADMIN — ALUMINUM HYDROXIDE, MAGNESIUM HYDROXIDE, AND SIMETHICONE 15 ML: 200; 200; 20 SUSPENSION ORAL at 23:38

## 2023-09-10 RX ADMIN — KETOROLAC TROMETHAMINE 15 MG: 15 INJECTION, SOLUTION INTRAMUSCULAR; INTRAVENOUS at 23:38

## 2023-09-10 ASSESSMENT — ACTIVITIES OF DAILY LIVING (ADL): ADLS_ACUITY_SCORE: 33

## 2023-09-11 ENCOUNTER — APPOINTMENT (OUTPATIENT)
Dept: CT IMAGING | Facility: CLINIC | Age: 33
End: 2023-09-11
Attending: STUDENT IN AN ORGANIZED HEALTH CARE EDUCATION/TRAINING PROGRAM
Payer: COMMERCIAL

## 2023-09-11 VITALS
OXYGEN SATURATION: 97 % | TEMPERATURE: 98.1 F | HEIGHT: 64 IN | HEART RATE: 64 BPM | WEIGHT: 266.2 LBS | DIASTOLIC BLOOD PRESSURE: 75 MMHG | BODY MASS INDEX: 45.45 KG/M2 | SYSTOLIC BLOOD PRESSURE: 117 MMHG | RESPIRATION RATE: 18 BRPM

## 2023-09-11 LAB
ALBUMIN SERPL BCG-MCNC: 3.2 G/DL (ref 3.5–5.2)
ALP SERPL-CCNC: 113 U/L (ref 35–104)
ALT SERPL W P-5'-P-CCNC: 18 U/L (ref 0–50)
ANION GAP SERPL CALCULATED.3IONS-SCNC: 11 MMOL/L (ref 7–15)
AST SERPL W P-5'-P-CCNC: 31 U/L (ref 0–45)
BILIRUB SERPL-MCNC: 0.2 MG/DL
BUN SERPL-MCNC: 10 MG/DL (ref 6–20)
CALCIUM SERPL-MCNC: 9.6 MG/DL (ref 8.6–10)
CHLORIDE SERPL-SCNC: 104 MMOL/L (ref 98–107)
CREAT SERPL-MCNC: 0.9 MG/DL (ref 0.51–0.95)
D DIMER PPP FEU-MCNC: 1.07 UG/ML FEU (ref 0–0.5)
DEPRECATED HCO3 PLAS-SCNC: 23 MMOL/L (ref 22–29)
EGFRCR SERPLBLD CKD-EPI 2021: 87 ML/MIN/1.73M2
GLUCOSE SERPL-MCNC: 90 MG/DL (ref 70–99)
LIPASE SERPL-CCNC: 21 U/L (ref 13–60)
POTASSIUM SERPL-SCNC: 3.5 MMOL/L (ref 3.4–5.3)
PROT SERPL-MCNC: 6.5 G/DL (ref 6.4–8.3)
SODIUM SERPL-SCNC: 138 MMOL/L (ref 136–145)
TROPONIN T SERPL HS-MCNC: <6 NG/L

## 2023-09-11 PROCEDURE — 250N000009 HC RX 250: Performed by: STUDENT IN AN ORGANIZED HEALTH CARE EDUCATION/TRAINING PROGRAM

## 2023-09-11 PROCEDURE — 250N000011 HC RX IP 250 OP 636: Performed by: STUDENT IN AN ORGANIZED HEALTH CARE EDUCATION/TRAINING PROGRAM

## 2023-09-11 PROCEDURE — 71275 CT ANGIOGRAPHY CHEST: CPT

## 2023-09-11 RX ORDER — IOPAMIDOL 755 MG/ML
94 INJECTION, SOLUTION INTRAVASCULAR ONCE
Status: COMPLETED | OUTPATIENT
Start: 2023-09-11 | End: 2023-09-11

## 2023-09-11 RX ADMIN — IOPAMIDOL 94 ML: 755 INJECTION, SOLUTION INTRAVENOUS at 00:50

## 2023-09-11 RX ADMIN — SODIUM CHLORIDE 100 ML: 9 INJECTION, SOLUTION INTRAVENOUS at 00:51

## 2023-09-11 ASSESSMENT — ACTIVITIES OF DAILY LIVING (ADL): ADLS_ACUITY_SCORE: 35

## 2023-09-11 NOTE — ED PROVIDER NOTES
"  History     Chief Complaint   Patient presents with     Postpartum Complications     HPI  Huong Meredith is a 33 year old female who is 4 days postpartum after spontaneous vaginal birth who presents to the emergency department complaining of pain and leg swelling.  Patient states she has been having right leg swelling ever since she gave birth, but it was much more swollen today.  She states that her foot was very swollen today.  Denies any pain in the calf or leg.  No history of blood clots.  She is also complaining of pain between her shoulder blades that radiates to her chest.  She endorses pain with taking deep breaths.  She endorses chest tightness.  She also has lower back pain and pain in her hips.  She states she has had 4 children prior to this and never had issues like this.  She states that the birth was uncomplicated, other than the epidural where she states that they told her they \"blew a vein.\"  She denies weakness or paresthesias in the lower extremities.  She denies headache or neck pain.  No vision issues.  She endorses a burning in her upper abdomen, but denies abdominal pain.  No nausea or vomiting.  No diarrhea, melena, hematochezia.  No dysuria.  She states she had normal prenatal care and had no issues for the pregnancy.  Denies fever.  She has not been taking any medications.    Allergies:  Allergies   Allergen Reactions     Miconazole      Adhesive Tape Itching and Rash     Blood-Group Specific Substance Unknown     Patient has Probable Passive Anti-D.  Blood Product orders may be delayed.  Draw one red top and two purple top tubes for ALL Type and Screen/ Type and Crossmatch orders.     Buspirone Rash     Calamine Rash     Nickel Chloride  [Nickel] Rash       Problem List:    Patient Active Problem List    Diagnosis Date Noted     Encounter for triage in pregnant patient 07/09/2023     Priority: Medium     Polyp of colon 05/25/2022     Priority: Medium     Depressive disorder 04/02/2021 " "    Priority: Medium     Seasonal allergies 07/01/2019     Priority: Medium     Anxiety 06/13/2018     Priority: Medium     Diverticulosis of large intestine 06/13/2018     Priority: Medium     Hemorrhoids, internal 06/13/2018     Priority: Medium     History of colonic polyps 06/13/2018     Priority: Medium     Morbid obesity with BMI of 40.0-44.9, adult (H) 01/28/2016     Priority: Medium     Prior fetal anencephaly in third trimester, antepartum 01/28/2016     Priority: Medium        Past Medical History:    No past medical history on file.    Past Surgical History:    No past surgical history on file.    Family History:    No family history on file.    Social History:  Marital Status:  Single [1]  Social History     Tobacco Use     Smoking status: Never     Smokeless tobacco: Never        Medications:    folic acid (FOLVITE) 1 MG tablet  Prenatal Vit-Fe Fumarate-FA (PRENATAL VITAMIN) 27-0.8 MG TABS          Review of Systems  See HPI  Physical Exam   BP: (!) 131/90  Pulse: 77  Temp: 98.1  F (36.7  C)  Resp: 18  Height: 162.6 cm (5' 4\")  Weight: 120.7 kg (266 lb 3.2 oz)  SpO2: 97 %      Physical Exam  /72   Pulse 64   Temp 98.1  F (36.7  C) (Oral)   Resp 18   Ht 1.626 m (5' 4\")   Wt 120.7 kg (266 lb 3.2 oz)   SpO2 99%   Breastfeeding No   BMI 45.69 kg/m    General: alert, interactive, in no apparent distress  Head: atraumatic  Nose: no rhinorrhea or epistaxis  Ears: no external auditory canal discharge or bleeding.    Eyes: Sclera nonicteric. Conjunctiva noninjected. PERRL, EOMI  Mouth: no tonsillar erythema, edema, or exudate.  Moist mucous membranes  Neck: supple, moving spontaneously no midline cervical tenderness  Lungs: No increased work of breathing.  Clear to auscultation bilaterally.  Chest is nontender to palpation.  CV: RRR, peripheral pulses palpable and symmetric  Abdomen: soft, nt, nd, no guarding or rebound.   Extremities: Warm and well-perfused.  Right lower extremity edematous " compared to the left side.  Skin: no rash or diaphoresis  Neuro: CN II-XII grossly intact, strength 5/5 in UE and LEs bilaterally, sensation intact to light touch in UE and LEs bilaterally;     ED Course            ED Course as of 09/11/23 0106   Mon Sep 11, 2023   0044  Lower Extremity Venous Duplex Right  No DVT.   0044 CBC with platelets differential(!)  Very mild leukocytosis is nonspecific.  Hemoglobin similar to previous   0045 Comprehensive metabolic panel(!)  Slight elevation of alk phos similar to previous.  Otherwise normal CMP   0045 Lipase  Normal lipase   0045 D dimer quantitative(!)  Elevated D-dimer certainly could be due to her recent pregnancy.  However, given her symptoms I do think we need to rule out PE.  We will proceed with CT PE study   0104 Patient signed out to Dr Clemens pending CT PE study.  This is normal, will discharge home.  Likely musculoskeletal etiology.     Procedures              EKG Interpretation:      Interpreted by Jung Estrella MD  Time reviewed: 12:41 AM    Symptoms at time of EKG: Pleuritic chest pain  Rhythm: normal sinus   Rate: normal  Axis: normal  Ectopy: none  Conduction: normal  ST Segments/ T Waves: No ST-T wave changes  Q Waves: none  Comparison to prior: Unchanged    Clinical Impression: normal EKG      Critical Care time:  none       No results found for this or any previous visit (from the past 24 hour(s)).    Medications - No data to display    Assessments & Plan (with Medical Decision Making)     I have reviewed the nursing notes.    I have reviewed the findings, diagnosis, plan and need for follow up with the patient.  Medical Decision Making  Huong Meredith is a 33 year old female who is 4 days postpartum after spontaneous vaginal birth who presents to the emergency department complaining of pain and leg swelling.  Vital signs are notable for mild hypertension, otherwise reassuring.  Exam is notable for asymmetric swelling in the right lower extremity.   Given her recent pregnancy and lower extremity swelling and pleuritic chest pain I do think we need to rule out DVT/PE.  EKG is reassuring.  She is having some burning in her abdomen, but abdomen is completely nontender.  I do not think she needs abdominal imaging.  Suspect this could be dyspepsia or GERD.  However, will check labs to ensure no signs of pancreatitis or hepatobiliary pathology.  Will give Maalox.  Toradol for pain.  We will plan for DVT ultrasound, troponin, D-dimer, CBC, CMP.  If work-up reassuring, I suspect musculoskeletal pain.  This position pending.        New Prescriptions    No medications on file       Final diagnoses:   None       9/10/2023   [facility] EMERGENCY DEPT       Jung Estrella MD  09/11/23 010

## 2023-09-11 NOTE — DISCHARGE INSTRUCTIONS
No signs of a blood clot.  Tests are looking reassuring.  Return to the emergency department for difficulty breathing, repeated vomiting, or other concerns.  Otherwise follow-up with your obstetrician.

## 2023-09-11 NOTE — ED PROVIDER NOTES
The patient is signed out to me by Dr. Estrella at change of shift.  Briefly this is a 33-year-old female who is followed days postpartum and presents with right lower extremity swelling and pleuritic chest pain.  Work-up so far has been reassuring and she is awaiting CT of the chest for rule out of pulmonary embolism.    CT of the chest obtained, images interpreted independently as well as radiology read reviewed, no signs of pulmonary embolism, pneumonia, or pneumothorax.  On recheck the patient is comfortable, well-appearing, breathing comfortably on room air.  At this time she is safe to discharge home with reassurance and instructions to use acetaminophen and ibuprofen as needed, return if worse, otherwise get rechecked in clinic if not better over the next several days.  The patient is in agreement with this plan.     Twan Clemens MD  09/11/23 0423

## 2023-09-11 NOTE — ED TRIAGE NOTES
"Pt presents after giving birth 4 days ago at [place] with pressure between shoulder blades, pelvic pain, back pain, and right leg swelling and pain. Pt reports the provider who did epidural said they \"blew a vein\" when doing an epidural.   Pt is unsure if her discomfort is due to the complication from the epidural.      Triage Assessment       Row Name 09/10/23 6868       Triage Assessment (Adult)    Airway WDL WDL       Respiratory WDL    Respiratory WDL WDL       Skin Circulation/Temperature WDL    Skin Circulation/Temperature WDL WDL       Cardiac WDL    Cardiac WDL WDL       Peripheral/Neurovascular WDL    Peripheral Neurovascular WDL WDL       Cognitive/Neuro/Behavioral WDL    Cognitive/Neuro/Behavioral WDL WDL       Pascual Coma Scale    Best Eye Response 4-->(E4) spontaneous    Best Motor Response 6-->(M6) obeys commands    Best Verbal Response 5-->(V5) oriented    [place] Coma Scale Score 15                    "

## 2023-12-26 ENCOUNTER — APPOINTMENT (OUTPATIENT)
Dept: ULTRASOUND IMAGING | Facility: CLINIC | Age: 33
End: 2023-12-26
Attending: EMERGENCY MEDICINE
Payer: COMMERCIAL

## 2023-12-26 ENCOUNTER — APPOINTMENT (OUTPATIENT)
Dept: CT IMAGING | Facility: CLINIC | Age: 33
End: 2023-12-26
Attending: EMERGENCY MEDICINE
Payer: COMMERCIAL

## 2023-12-26 ENCOUNTER — HOSPITAL ENCOUNTER (EMERGENCY)
Facility: CLINIC | Age: 33
Discharge: HOME | End: 2023-12-26
Attending: EMERGENCY MEDICINE | Admitting: EMERGENCY MEDICINE
Payer: COMMERCIAL

## 2023-12-26 VITALS
SYSTOLIC BLOOD PRESSURE: 105 MMHG | TEMPERATURE: 96.6 F | OXYGEN SATURATION: 97 % | HEIGHT: 64 IN | WEIGHT: 265 LBS | BODY MASS INDEX: 45.24 KG/M2 | RESPIRATION RATE: 18 BRPM | HEART RATE: 71 BPM | DIASTOLIC BLOOD PRESSURE: 66 MMHG

## 2023-12-26 DIAGNOSIS — R10.32 ABDOMINAL PAIN, LEFT LOWER QUADRANT: ICD-10-CM

## 2023-12-26 LAB
ALBUMIN SERPL BCG-MCNC: 4.2 G/DL (ref 3.5–5.2)
ALBUMIN UR-MCNC: NEGATIVE MG/DL
ALP SERPL-CCNC: 108 U/L (ref 40–150)
ALT SERPL W P-5'-P-CCNC: 10 U/L (ref 0–50)
ANION GAP SERPL CALCULATED.3IONS-SCNC: 9 MMOL/L (ref 7–15)
APPEARANCE UR: ABNORMAL
AST SERPL W P-5'-P-CCNC: 17 U/L (ref 0–45)
BASOPHILS # BLD AUTO: 0 10E3/UL (ref 0–0.2)
BASOPHILS NFR BLD AUTO: 0 %
BILIRUB SERPL-MCNC: 0.4 MG/DL
BILIRUB UR QL STRIP: NEGATIVE
BUN SERPL-MCNC: 10.1 MG/DL (ref 6–20)
CALCIUM SERPL-MCNC: 9.1 MG/DL (ref 8.6–10)
CHLORIDE SERPL-SCNC: 107 MMOL/L (ref 98–107)
COLOR UR AUTO: YELLOW
CREAT SERPL-MCNC: 0.7 MG/DL (ref 0.51–0.95)
DEPRECATED HCO3 PLAS-SCNC: 23 MMOL/L (ref 22–29)
EGFRCR SERPLBLD CKD-EPI 2021: >90 ML/MIN/1.73M2
EOSINOPHIL # BLD AUTO: 0.2 10E3/UL (ref 0–0.7)
EOSINOPHIL NFR BLD AUTO: 1 %
ERYTHROCYTE [DISTWIDTH] IN BLOOD BY AUTOMATED COUNT: 13.2 % (ref 10–15)
GLUCOSE SERPL-MCNC: 92 MG/DL (ref 70–99)
GLUCOSE UR STRIP-MCNC: NEGATIVE MG/DL
HCG UR QL: NEGATIVE
HCT VFR BLD AUTO: 40.5 % (ref 35–47)
HGB BLD-MCNC: 13.3 G/DL (ref 11.7–15.7)
HGB UR QL STRIP: NEGATIVE
IMM GRANULOCYTES # BLD: 0.2 10E3/UL
IMM GRANULOCYTES NFR BLD: 1 %
KETONES UR STRIP-MCNC: NEGATIVE MG/DL
LEUKOCYTE ESTERASE UR QL STRIP: ABNORMAL
LYMPHOCYTES # BLD AUTO: 3.2 10E3/UL (ref 0.8–5.3)
LYMPHOCYTES NFR BLD AUTO: 22 %
MCH RBC QN AUTO: 28.1 PG (ref 26.5–33)
MCHC RBC AUTO-ENTMCNC: 32.8 G/DL (ref 31.5–36.5)
MCV RBC AUTO: 86 FL (ref 78–100)
MONOCYTES # BLD AUTO: 0.6 10E3/UL (ref 0–1.3)
MONOCYTES NFR BLD AUTO: 4 %
MUCOUS THREADS #/AREA URNS LPF: PRESENT /LPF
NEUTROPHILS # BLD AUTO: 10.2 10E3/UL (ref 1.6–8.3)
NEUTROPHILS NFR BLD AUTO: 72 %
NITRATE UR QL: NEGATIVE
NRBC # BLD AUTO: 0 10E3/UL
NRBC BLD AUTO-RTO: 0 /100
PH UR STRIP: 5 [PH] (ref 5–7)
PLATELET # BLD AUTO: 366 10E3/UL (ref 150–450)
POTASSIUM SERPL-SCNC: 4 MMOL/L (ref 3.4–5.3)
PROT SERPL-MCNC: 7.7 G/DL (ref 6.4–8.3)
RBC # BLD AUTO: 4.73 10E6/UL (ref 3.8–5.2)
RBC URINE: 3 /HPF
SODIUM SERPL-SCNC: 139 MMOL/L (ref 135–145)
SP GR UR STRIP: 1.03 (ref 1–1.03)
SQUAMOUS EPITHELIAL: 2 /HPF
UROBILINOGEN UR STRIP-MCNC: 2 MG/DL
WBC # BLD AUTO: 14.5 10E3/UL (ref 4–11)
WBC URINE: 1 /HPF

## 2023-12-26 PROCEDURE — 74177 CT ABD & PELVIS W/CONTRAST: CPT

## 2023-12-26 PROCEDURE — 36415 COLL VENOUS BLD VENIPUNCTURE: CPT | Performed by: FAMILY MEDICINE

## 2023-12-26 PROCEDURE — 81025 URINE PREGNANCY TEST: CPT | Performed by: EMERGENCY MEDICINE

## 2023-12-26 PROCEDURE — 250N000011 HC RX IP 250 OP 636: Performed by: EMERGENCY MEDICINE

## 2023-12-26 PROCEDURE — 80053 COMPREHEN METABOLIC PANEL: CPT | Performed by: FAMILY MEDICINE

## 2023-12-26 PROCEDURE — 96374 THER/PROPH/DIAG INJ IV PUSH: CPT | Mod: 59 | Performed by: EMERGENCY MEDICINE

## 2023-12-26 PROCEDURE — 81003 URINALYSIS AUTO W/O SCOPE: CPT | Performed by: EMERGENCY MEDICINE

## 2023-12-26 PROCEDURE — 250N000009 HC RX 250: Performed by: EMERGENCY MEDICINE

## 2023-12-26 PROCEDURE — 76830 TRANSVAGINAL US NON-OB: CPT

## 2023-12-26 PROCEDURE — 85004 AUTOMATED DIFF WBC COUNT: CPT | Performed by: FAMILY MEDICINE

## 2023-12-26 PROCEDURE — 85025 COMPLETE CBC W/AUTO DIFF WBC: CPT | Performed by: EMERGENCY MEDICINE

## 2023-12-26 PROCEDURE — 258N000003 HC RX IP 258 OP 636: Performed by: EMERGENCY MEDICINE

## 2023-12-26 PROCEDURE — 99284 EMERGENCY DEPT VISIT MOD MDM: CPT | Performed by: EMERGENCY MEDICINE

## 2023-12-26 PROCEDURE — 80053 COMPREHEN METABOLIC PANEL: CPT | Performed by: EMERGENCY MEDICINE

## 2023-12-26 PROCEDURE — 99285 EMERGENCY DEPT VISIT HI MDM: CPT | Mod: 25 | Performed by: EMERGENCY MEDICINE

## 2023-12-26 PROCEDURE — 96361 HYDRATE IV INFUSION ADD-ON: CPT | Performed by: EMERGENCY MEDICINE

## 2023-12-26 RX ORDER — IOPAMIDOL 755 MG/ML
100 INJECTION, SOLUTION INTRAVASCULAR ONCE
Status: COMPLETED | OUTPATIENT
Start: 2023-12-26 | End: 2023-12-26

## 2023-12-26 RX ORDER — KETOROLAC TROMETHAMINE 15 MG/ML
10 INJECTION, SOLUTION INTRAMUSCULAR; INTRAVENOUS
Status: COMPLETED | OUTPATIENT
Start: 2023-12-26 | End: 2023-12-26

## 2023-12-26 RX ADMIN — KETOROLAC TROMETHAMINE 10 MG: 15 INJECTION, SOLUTION INTRAMUSCULAR; INTRAVENOUS at 17:50

## 2023-12-26 RX ADMIN — SODIUM CHLORIDE 500 ML: 9 INJECTION, SOLUTION INTRAVENOUS at 17:49

## 2023-12-26 RX ADMIN — SODIUM CHLORIDE 72 ML: 9 INJECTION, SOLUTION INTRAVENOUS at 18:25

## 2023-12-26 RX ADMIN — IOPAMIDOL 100 ML: 755 INJECTION, SOLUTION INTRAVENOUS at 18:25

## 2023-12-26 ASSESSMENT — ENCOUNTER SYMPTOMS
PSYCHIATRIC NEGATIVE: 1
CARDIOVASCULAR NEGATIVE: 1
MUSCULOSKELETAL NEGATIVE: 1
ALLERGIC/IMMUNOLOGIC NEGATIVE: 1
ENDOCRINE NEGATIVE: 1
CONSTITUTIONAL NEGATIVE: 1
HEMATOLOGIC/LYMPHATIC NEGATIVE: 1
NEUROLOGICAL NEGATIVE: 1
EYES NEGATIVE: 1
ABDOMINAL PAIN: 1
RESPIRATORY NEGATIVE: 1

## 2023-12-26 ASSESSMENT — ACTIVITIES OF DAILY LIVING (ADL)
ADLS_ACUITY_SCORE: 35
ADLS_ACUITY_SCORE: 35

## 2023-12-26 NOTE — ED PROVIDER NOTES
History     Chief Complaint   Patient presents with    Abdominal Pain     HPI  Huong Meredith is a 33 year old female who presents with report of 3 days of left-sided abdominal pain.    Patient's medical records show history of diverticulosis and anxiety.  Prior care for colonic polyps and hemorrhoids.  History of seasonal allergies.    On examination patient arrived by car alone from home in [facility].  She reports her boyfriend lives in [facility].  She treated a month ago for possible diverticulitis.  Medical records were she was assessed on 11/2/2023 and treated with Augmentin.  Patient reports in the last 4 days she has had left lower quadrant pain rating down to the left flank.  Pain is rated a 4 out of 10.  No prior abdominal surgeries.  She had a vaginal delivery September 2023.  No abnormal spotting or discharge.She reports a history of irregular periods currently on birth control.  With persistent pain in light of recent treatment for presumed diverticulitis last month she presents for further care     Allergies:  Allergies   Allergen Reactions    Miconazole     Adhesive Tape Itching and Rash    Blood-Group Specific Substance Unknown     Patient has Probable Passive Anti-D.  Blood Product orders may be delayed.  Draw one red top and two purple top tubes for ALL Type and Screen/ Type and Crossmatch orders.    Buspirone Rash    Calamine Rash    Nickel Chloride  [Nickel] Rash       Problem List:    Patient Active Problem List    Diagnosis Date Noted    Encounter for triage in pregnant patient 07/09/2023     Priority: Medium    Polyp of colon 05/25/2022     Priority: Medium    Depressive disorder 04/02/2021     Priority: Medium    Seasonal allergies 07/01/2019     Priority: Medium    Anxiety 06/13/2018     Priority: Medium    Diverticulosis of large intestine 06/13/2018     Priority: Medium    Hemorrhoids, internal 06/13/2018     Priority: Medium    History of colonic polyps 06/13/2018      Prior to immunization administration, verified patients identity using patient s name and date of birth. Please see Immunization Activity for additional information.     Screening Questionnaire for Pediatric Immunization    Is the child sick today?   No   Does the child have allergies to medications, food, a vaccine component, or latex?   No   Has the child had a serious reaction to a vaccine in the past?   No   Does the child have a long-term health problem with lung, heart, kidney or metabolic disease (e.g., diabetes), asthma, a blood disorder, no spleen, complement component deficiency, a cochlear implant, or a spinal fluid leak?  Is he/she on long-term aspirin therapy?   No   If the child to be vaccinated is 2 through 4 years of age, has a healthcare provider told you that the child had wheezing or asthma in the  past 12 months?   No   If your child is a baby, have you ever been told he or she has had intussusception?   No   Has the child, sibling or parent had a seizure, has the child had brain or other nervous system problems?   No   Does the child have cancer, leukemia, AIDS, or any immune system         problem?   No   Does the child have a parent, brother, or sister with an immune system problem?   Yes   In the past 3 months, has the child taken medications that affect the immune system such as prednisone, other steroids, or anticancer drugs; drugs for the treatment of rheumatoid arthritis, Crohn s disease, or psoriasis; or had radiation treatments?   No   In the past year, has the child received a transfusion of blood or blood products, or been given immune (gamma) globulin or an antiviral drug?   No   Is the child/teen pregnant or is there a chance that she could become       pregnant during the next month?   No   Has the child received any vaccinations in the past 4 weeks?   No      Immunization questionnaire was positive for at least one answer.  Notified Dr. Barraza.        [facility] eligibility  "Priority: Medium    Morbid obesity with BMI of 40.0-44.9, adult (H) 01/28/2016     Priority: Medium    Prior fetal anencephaly in third trimester, antepartum 01/28/2016     Priority: Medium        Past Medical History:    No past medical history on file.    Past Surgical History:    No past surgical history on file.    Family History:    No family history on file.    Social History:  Marital Status:  Single [1]  Social History     Tobacco Use    Smoking status: Never    Smokeless tobacco: Never        Medications:    folic acid (FOLVITE) 1 MG tablet  Prenatal Vit-Fe Fumarate-FA (PRENATAL VITAMIN) 27-0.8 MG TABS          Review of Systems   Constitutional: Negative.    HENT: Negative.     Eyes: Negative.    Respiratory: Negative.     Cardiovascular: Negative.    Gastrointestinal:  Positive for abdominal pain.   Endocrine: Negative.    Genitourinary: Negative.    Musculoskeletal: Negative.    Skin: Negative.    Allergic/Immunologic: Negative.    Neurological: Negative.    Hematological: Negative.    Psychiatric/Behavioral: Negative.     All other systems reviewed and are negative.      Physical Exam   BP: 112/76  Pulse: 66  Temp: (!) 96.6  F (35.9  C)  Resp: 18  Height: 162.6 cm (5' 4\")  Weight: 120.2 kg (265 lb) (stated)  SpO2: 100 %      Physical Exam  Constitutional:       General: She is not in acute distress.     Appearance: She is not ill-appearing, toxic-appearing or diaphoretic.   HENT:      Head: Normocephalic and atraumatic.   Eyes:      Extraocular Movements: Extraocular movements intact.      Pupils: Pupils are equal, round, and reactive to light.   Cardiovascular:      Rate and Rhythm: Normal rate and regular rhythm.   Pulmonary:      Effort: Pulmonary effort is normal.      Breath sounds: Normal breath sounds.   Abdominal:      General: Abdomen is flat.      Palpations: Abdomen is soft.      Tenderness: There is abdominal tenderness in the left lower quadrant.       Genitourinary:     Adnexa:         " self-screening form given to patient.    Per orders of Dr. Barraza, injection of HPV given by Maria Rosa. Patient instructed to remain in clinic for 15 minutes afterwards, and to report any adverse reaction to me immediately.    Screening performed by Maria Rosa on 7/22/2022 at 4:12 PM.     "Right: No mass, tenderness or fullness.          Left: No mass, tenderness or fullness.     Musculoskeletal:        Back:    Skin:     Capillary Refill: Capillary refill takes less than 2 seconds.   Neurological:      General: No focal deficit present.      Mental Status: She is alert and oriented to person, place, and time.   Psychiatric:         Mood and Affect: Mood normal. Mood is not anxious or depressed.         Behavior: Behavior normal.         ED Course                 Procedures              Critical Care time:  none               ED medications:  Medications   sodium chloride 0.9% BOLUS 500 mL (500 mLs Intravenous $New Bag 12/26/23 1749)   ketorolac (TORADOL) injection 10 mg (10 mg Intravenous $Given 12/26/23 1750)   iopamidol (ISOVUE-370) solution 100 mL (100 mLs Intravenous $Given 12/26/23 1825)   sodium chloride 0.9 % bag 500mL for CT scan flush use (72 mLs Intravenous $Given 12/26/23 1825)     ED Vitals:  Vitals:    12/26/23 1206 12/26/23 1937   BP: 112/76 106/74   Pulse: 66 69   Resp: 18 16   Temp: (!) 96.6  F (35.9  C)    TempSrc: Tympanic    SpO2: 100%    Weight: 120.2 kg (265 lb)    Height: 1.626 m (5' 4\")      ED labs and imaging:  Results for orders placed or performed during the hospital encounter of 12/26/23 (from the past 24 hour(s))   CBC with platelets, differential    Narrative    The following orders were created for panel order CBC with platelets, differential.  Procedure                               Abnormality         Status                     ---------                               -----------         ------                     CBC with platelets and d...[[id number]]  Abnormal            Final result                 Please view results for these tests on the individual orders.   Comprehensive metabolic panel   Result Value Ref Range    Sodium 139 135 - 145 mmol/L    Potassium 4.0 3.4 - 5.3 mmol/L    Carbon Dioxide (CO2) 23 22 - 29 mmol/L    Anion Gap 9 7 - 15 mmol/L    Urea Nitrogen " 10.1 6.0 - 20.0 mg/dL    Creatinine 0.70 0.51 - 0.95 mg/dL    GFR Estimate >90 >60 mL/min/1.73m2    Calcium 9.1 8.6 - 10.0 mg/dL    Chloride 107 98 - 107 mmol/L    Glucose 92 70 - 99 mg/dL    Alkaline Phosphatase 108 40 - 150 U/L    AST 17 0 - 45 U/L    ALT 10 0 - 50 U/L    Protein Total 7.7 6.4 - 8.3 g/dL    Albumin 4.2 3.5 - 5.2 g/dL    Bilirubin Total 0.4 <=1.2 mg/dL   CBC with platelets and differential   Result Value Ref Range    WBC Count 14.5 (H) 4.0 - 11.0 10e3/uL    RBC Count 4.73 3.80 - 5.20 10e6/uL    Hemoglobin 13.3 11.7 - 15.7 g/dL    Hematocrit 40.5 35.0 - 47.0 %    MCV 86 78 - 100 fL    MCH 28.1 26.5 - 33.0 pg    MCHC 32.8 31.5 - 36.5 g/dL    RDW 13.2 10.0 - 15.0 %    Platelet Count 366 150 - 450 10e3/uL    % Neutrophils 72 %    % Lymphocytes 22 %    % Monocytes 4 %    % Eosinophils 1 %    % Basophils 0 %    % Immature Granulocytes 1 %    NRBCs per 100 WBC 0 <1 /100    Absolute Neutrophils 10.2 (H) 1.6 - 8.3 10e3/uL    Absolute Lymphocytes 3.2 0.8 - 5.3 10e3/uL    Absolute Monocytes 0.6 0.0 - 1.3 10e3/uL    Absolute Eosinophils 0.2 0.0 - 0.7 10e3/uL    Absolute Basophils 0.0 0.0 - 0.2 10e3/uL    Absolute Immature Granulocytes 0.2 <=0.4 10e3/uL    Absolute NRBCs 0.0 10e3/uL   UA with Microscopic reflex to Culture    Specimen: Urine, Clean Catch   Result Value Ref Range    Color Urine Yellow Colorless, Straw, Light Yellow, Yellow    Appearance Urine Slightly Cloudy (A) Clear    Glucose Urine Negative Negative mg/dL    Bilirubin Urine Negative Negative    Ketones Urine Negative Negative mg/dL    Specific Gravity Urine 1.029 1.003 - 1.035    Blood Urine Negative Negative    pH Urine 5.0 5.0 - 7.0    Protein Albumin Urine Negative Negative mg/dL    Urobilinogen Urine 2.0 Normal, 2.0 mg/dL    Nitrite Urine Negative Negative    Leukocyte Esterase Urine Trace (A) Negative    Mucus Urine Present (A) None Seen /LPF    RBC Urine 3 (H) <=2 /HPF    WBC Urine 1 <=5 /HPF    Squamous Epithelials Urine 2 (H) <=1 /HPF     Narrative    Urine Culture not indicated   HCG qualitative urine   Result Value Ref Range    hCG Urine Qualitative Negative Negative   CT Abdomen Pelvis w Contrast    Narrative    EXAM: CT ABDOMEN PELVIS W CONTRAST  LOCATION: [facility]  DATE: 12/26/2023    INDICATION: 4 day history of left lower quadrant abdominal pain and left flank pain.  Hx of diverticulitis. Evaluate for acute intra abdominal and or retroperitoneal process.  COMPARISON: CT exams 09/11/2023, 2/14/2022 and 12/30/2020  TECHNIQUE: CT scan of the abdomen and pelvis was performed following injection of IV contrast. Multiplanar reformats were obtained. Dose reduction techniques were used.  CONTRAST: 100 mL Isovue-370    FINDINGS:   LOWER CHEST: Normal.    HEPATOBILIARY: Normal.    PANCREAS: Normal.    SPLEEN: Normal.    ADRENAL GLANDS: Normal.    KIDNEYS/BLADDER: Normal.    BOWEL: Normal small bowel and appendix. Colonic diverticulosis without evidence of acute diverticulitis. No free air, free fluid or abscess.    LYMPH NODES: Normal.    VASCULATURE: Unremarkable.    PELVIC ORGANS: Incidental 2 cm dominant right ovarian follicle. No pelvic mass or free fluid.    MUSCULOSKELETAL: Spinal degenerative changes.      Impression    IMPRESSION:   1.  No findings to explain the patient's symptoms. No inflammatory process, hydronephrosis or bowel obstruction.  2.  Colonic diverticulosis without evidence of acute diverticulitis.   US Pelvis Cmplt w Transvag & Doppler LmtPel Duplex Limited    Narrative    EXAM: US PELVIS COMPLETE W TRANSVAGINAL AND DOPPLER LIMITED  LOCATION: [facility]  DATE: 12/26/2023    INDICATION: 4 day history of left lower quadrant abdominal pain and flank pain.  Patient request ultrasound. Evaluate for ovarian cyst versus torsion.  Unrevealing urinalysis and CT.  COMPARISON: None.  TECHNIQUE: Transabdominal scans were performed. Endovaginal ultrasound was performed to better  visualize the adnexa. Color flow with spectral Doppler and waveform analysis performed.    FINDINGS:    UTERUS: 9.8 x 6.2 x 4.6 cm. Normal in size and position with no masses.    ENDOMETRIUM: 6 mm. Normal smooth endometrium.    RIGHT OVARY: 3.2 x 2.8 x 2 cm. Normal with arterial and venous duplex flow identified.    LEFT OVARY: Not visualized due to overlying bowel gas.    No significant free fluid.      Impression    IMPRESSION:    1.  Normal pelvic ultrasound.             Assessments & Plan (with Medical Decision Making)   Assessment Summary and Clinical Impression: 33-year-old female presenting with 4 days of left lower quad abdominal pain left flank pain.  Treated presumptively for possible diverticulitis a month ago with Augmentin.  Known history of diverticulitis.  Had a vaginal delivery September 2023.  No fever or chills.  History of irregular menstrual periods currently on birth control.  Presented with persistent pain and discomfort for further assessment and care.  On exam he appeared in no acute distress.  Pain was rated a 4 out of 10.  Patient was captured to be afebrile.  Blood pressure was 112/76.  100% on room air.  Localized tenderness in the left lower quadrant on exam without rebound or guarding.  With history of diverticulitis pain we discussed and reviewed treatment goals and options and she agreed to proceed with imaging and broadening the workup.  Workup was unrevealing although the left ovary was not well-visualized per radiology due to overlying bowel gas.  Patient appeared comfortable with going home with watchful waiting with symptoms of uncertain cause with low threshold to return for re-evaluation.    ED course and plan:  Reviewed the medical record.  Reviewed visit on 11/2/2023.  She was offered medications to manage her pain is rated and reported.  Workup initiated on arrival revealed a white count of 14.5.  Normal hemoglobin.  Normal electrolytes and liver enzymes.  Normal creatinine.   Glucose was 92.  Due to history of diverticulitis his abdominal imaging with contrast was obtained, with last CT  abdominal imaging from 2/14/2022. Abdominal imaging with contrast revealed no acute findings to explain patient's symptoms.  Urinalysis revealed trace leukocyte esterase negative urine pregnancy.  She reported no urinary symptoms low suspicion for UTI.  She also had no vaginal symptoms.  She was offered a pelvic ultrasound for additional diagnostic imaging and workup given subacute left lower quadrant pain to exclude ovarian cyst with torsion although clinical suspicion is low.  After reviewing risk and benefit patient elected to proceed with pelvic ultrasound.  Comprehensive pelvic ultrasound with Doppler revealed no acute findings- although radiologist did note that the left ovary was not well-visualized due to overlying bowel gas patient was discharged with  symptoms of uncertain cause.  We reviewed that if she has persistent pain she should return to be reevaluated and also discussed concerning symptoms such as bleeding spotting or any other new concerns.  Patient expressed comfort with this plan.      Disclaimer: This note consists of symbols derived from keyboarding, dictation and/or voice recognition software. As a result, there may be errors in the script that have gone undetected. Please consider this when interpreting information found in this chart.   I have reviewed the nursing notes.    I have reviewed the findings, diagnosis, plan and need for follow up with the patient.           Medical Decision Making  The patient's presentation was of moderate complexity (an acute illness with systemic symptoms).    The patient's evaluation involved:  ordering and/or review of 2 test(s) in this encounter (diagnostic imaging and labs)    The patient's management necessitated moderate risk (prescription drug management including medications given in the ED).        New Prescriptions    No medications on  file       Final diagnoses:   Abdominal pain, left lower quadrant - Over the last 4 days       12/26/2023   [facility] EMERGENCY DEPT       Nii Garcia MD  12/26/23 2408

## 2023-12-26 NOTE — ED TRIAGE NOTES
Pt having left sided abdominal pain for last 3 days     Triage Assessment (Adult)       Row Name 12/26/23 1204          Triage Assessment    Airway WDL WDL        Respiratory WDL    Respiratory WDL WDL        Skin Circulation/Temperature WDL    Skin Circulation/Temperature WDL WDL        Cardiac WDL    Cardiac WDL WDL        Peripheral/Neurovascular WDL    Peripheral Neurovascular WDL WDL        Cognitive/Neuro/Behavioral WDL    Cognitive/Neuro/Behavioral WDL WDL

## 2023-12-27 NOTE — DISCHARGE INSTRUCTIONS
1) Your evaluation today did not reveal an emergency condition or diagnosis of the exact cause of your pain and discomfort over the last 4 days.  Your workup did not suggest a recurrence of diverticulitis and pelvic ultrasound was normal with no finding of cysts or abnormal flow to the ovaries-although the left ovary was not well-visualized due to obstructive bowel gas    2) We have reviewed that although your workup and assessment was reassuring with no emergency condition or finding we discussed that if you develop new concerns including fever, abnormal vaginal bleeding or new concerns you should return to be reevaluated.

## 2024-01-24 ENCOUNTER — HOSPITAL ENCOUNTER (EMERGENCY)
Facility: CLINIC | Age: 34
Discharge: HOME | End: 2024-01-24
Attending: EMERGENCY MEDICINE | Admitting: EMERGENCY MEDICINE
Payer: COMMERCIAL

## 2024-01-24 ENCOUNTER — APPOINTMENT (OUTPATIENT)
Dept: CT IMAGING | Facility: CLINIC | Age: 34
End: 2024-01-24
Attending: EMERGENCY MEDICINE
Payer: COMMERCIAL

## 2024-01-24 VITALS
OXYGEN SATURATION: 98 % | RESPIRATION RATE: 18 BRPM | WEIGHT: 265 LBS | BODY MASS INDEX: 45.24 KG/M2 | SYSTOLIC BLOOD PRESSURE: 115 MMHG | HEART RATE: 94 BPM | HEIGHT: 64 IN | TEMPERATURE: 98.4 F | DIASTOLIC BLOOD PRESSURE: 89 MMHG

## 2024-01-24 DIAGNOSIS — K57.92 ACUTE DIVERTICULITIS: ICD-10-CM

## 2024-01-24 DIAGNOSIS — R10.32 LLQ ABDOMINAL PAIN: ICD-10-CM

## 2024-01-24 LAB
ALBUMIN SERPL BCG-MCNC: 4.1 G/DL (ref 3.5–5.2)
ALBUMIN UR-MCNC: 30 MG/DL
ALP SERPL-CCNC: 110 U/L (ref 40–150)
ALT SERPL W P-5'-P-CCNC: 13 U/L (ref 0–50)
ANION GAP SERPL CALCULATED.3IONS-SCNC: 14 MMOL/L (ref 7–15)
APPEARANCE UR: ABNORMAL
AST SERPL W P-5'-P-CCNC: 19 U/L (ref 0–45)
BASOPHILS # BLD AUTO: 0.1 10E3/UL (ref 0–0.2)
BASOPHILS NFR BLD AUTO: 0 %
BILIRUB SERPL-MCNC: 0.3 MG/DL
BILIRUB UR QL STRIP: NEGATIVE
BUN SERPL-MCNC: 14.3 MG/DL (ref 6–20)
CALCIUM SERPL-MCNC: 9.4 MG/DL (ref 8.6–10)
CHLORIDE SERPL-SCNC: 106 MMOL/L (ref 98–107)
COLOR UR AUTO: YELLOW
CREAT SERPL-MCNC: 0.84 MG/DL (ref 0.51–0.95)
DEPRECATED HCO3 PLAS-SCNC: 23 MMOL/L (ref 22–29)
EGFRCR SERPLBLD CKD-EPI 2021: >90 ML/MIN/1.73M2
EOSINOPHIL # BLD AUTO: 0.4 10E3/UL (ref 0–0.7)
EOSINOPHIL NFR BLD AUTO: 2 %
ERYTHROCYTE [DISTWIDTH] IN BLOOD BY AUTOMATED COUNT: 12.8 % (ref 10–15)
GLUCOSE SERPL-MCNC: 96 MG/DL (ref 70–99)
GLUCOSE UR STRIP-MCNC: NEGATIVE MG/DL
HCG SERPL QL: NEGATIVE
HCT VFR BLD AUTO: 40.8 % (ref 35–47)
HGB BLD-MCNC: 13.6 G/DL (ref 11.7–15.7)
HGB UR QL STRIP: NEGATIVE
IMM GRANULOCYTES # BLD: 0.1 10E3/UL
IMM GRANULOCYTES NFR BLD: 0 %
KETONES UR STRIP-MCNC: 5 MG/DL
LEUKOCYTE ESTERASE UR QL STRIP: NEGATIVE
LYMPHOCYTES # BLD AUTO: 3.7 10E3/UL (ref 0.8–5.3)
LYMPHOCYTES NFR BLD AUTO: 23 %
MCH RBC QN AUTO: 27.9 PG (ref 26.5–33)
MCHC RBC AUTO-ENTMCNC: 33.3 G/DL (ref 31.5–36.5)
MCV RBC AUTO: 84 FL (ref 78–100)
MONOCYTES # BLD AUTO: 1 10E3/UL (ref 0–1.3)
MONOCYTES NFR BLD AUTO: 6 %
MUCOUS THREADS #/AREA URNS LPF: PRESENT /LPF
NEUTROPHILS # BLD AUTO: 11.1 10E3/UL (ref 1.6–8.3)
NEUTROPHILS NFR BLD AUTO: 69 %
NITRATE UR QL: NEGATIVE
NRBC # BLD AUTO: 0 10E3/UL
NRBC BLD AUTO-RTO: 0 /100
PH UR STRIP: 7 [PH] (ref 5–7)
PLATELET # BLD AUTO: 347 10E3/UL (ref 150–450)
POTASSIUM SERPL-SCNC: 3.9 MMOL/L (ref 3.4–5.3)
PROT SERPL-MCNC: 7.8 G/DL (ref 6.4–8.3)
RBC # BLD AUTO: 4.87 10E6/UL (ref 3.8–5.2)
RBC URINE: 0 /HPF
SODIUM SERPL-SCNC: 143 MMOL/L (ref 135–145)
SP GR UR STRIP: 1.03 (ref 1–1.03)
SQUAMOUS EPITHELIAL: 1 /HPF
UROBILINOGEN UR STRIP-MCNC: 2 MG/DL
WBC # BLD AUTO: 16.3 10E3/UL (ref 4–11)
WBC URINE: 1 /HPF

## 2024-01-24 PROCEDURE — 81001 URINALYSIS AUTO W/SCOPE: CPT | Performed by: EMERGENCY MEDICINE

## 2024-01-24 PROCEDURE — 96374 THER/PROPH/DIAG INJ IV PUSH: CPT | Mod: 59 | Performed by: EMERGENCY MEDICINE

## 2024-01-24 PROCEDURE — 74177 CT ABD & PELVIS W/CONTRAST: CPT

## 2024-01-24 PROCEDURE — 250N000009 HC RX 250: Performed by: EMERGENCY MEDICINE

## 2024-01-24 PROCEDURE — 258N000003 HC RX IP 258 OP 636: Performed by: EMERGENCY MEDICINE

## 2024-01-24 PROCEDURE — 96361 HYDRATE IV INFUSION ADD-ON: CPT | Performed by: EMERGENCY MEDICINE

## 2024-01-24 PROCEDURE — 99284 EMERGENCY DEPT VISIT MOD MDM: CPT | Performed by: EMERGENCY MEDICINE

## 2024-01-24 PROCEDURE — 84703 CHORIONIC GONADOTROPIN ASSAY: CPT | Performed by: EMERGENCY MEDICINE

## 2024-01-24 PROCEDURE — 250N000011 HC RX IP 250 OP 636: Performed by: EMERGENCY MEDICINE

## 2024-01-24 PROCEDURE — 82040 ASSAY OF SERUM ALBUMIN: CPT | Performed by: EMERGENCY MEDICINE

## 2024-01-24 PROCEDURE — 99285 EMERGENCY DEPT VISIT HI MDM: CPT | Mod: 25 | Performed by: EMERGENCY MEDICINE

## 2024-01-24 PROCEDURE — 36415 COLL VENOUS BLD VENIPUNCTURE: CPT | Performed by: EMERGENCY MEDICINE

## 2024-01-24 PROCEDURE — 85041 AUTOMATED RBC COUNT: CPT | Performed by: EMERGENCY MEDICINE

## 2024-01-24 RX ORDER — METRONIDAZOLE 500 MG/1
500 TABLET ORAL 3 TIMES DAILY
Qty: 30 TABLET | Refills: 0 | Status: SHIPPED | OUTPATIENT
Start: 2024-01-24 | End: 2024-04-27

## 2024-01-24 RX ORDER — ONDANSETRON 2 MG/ML
4 INJECTION INTRAMUSCULAR; INTRAVENOUS ONCE
Status: COMPLETED | OUTPATIENT
Start: 2024-01-24 | End: 2024-01-24

## 2024-01-24 RX ORDER — HYDROCODONE BITARTRATE AND ACETAMINOPHEN 5; 325 MG/1; MG/1
1 TABLET ORAL EVERY 6 HOURS PRN
Qty: 4 TABLET | Refills: 0 | Status: SHIPPED | OUTPATIENT
Start: 2024-01-24 | End: 2024-04-27

## 2024-01-24 RX ORDER — CIPROFLOXACIN 500 MG/1
500 TABLET, FILM COATED ORAL 2 TIMES DAILY
Qty: 14 TABLET | Refills: 0 | Status: SHIPPED | OUTPATIENT
Start: 2024-01-24 | End: 2024-04-27

## 2024-01-24 RX ORDER — IOPAMIDOL 755 MG/ML
100 INJECTION, SOLUTION INTRAVASCULAR ONCE
Status: COMPLETED | OUTPATIENT
Start: 2024-01-24 | End: 2024-01-24

## 2024-01-24 RX ADMIN — ONDANSETRON 4 MG: 2 INJECTION INTRAMUSCULAR; INTRAVENOUS at 20:04

## 2024-01-24 RX ADMIN — SODIUM CHLORIDE 500 ML: 9 INJECTION, SOLUTION INTRAVENOUS at 20:04

## 2024-01-24 RX ADMIN — SODIUM CHLORIDE 72 ML: 9 INJECTION, SOLUTION INTRAVENOUS at 20:46

## 2024-01-24 RX ADMIN — IOPAMIDOL 100 ML: 755 INJECTION, SOLUTION INTRAVENOUS at 20:46

## 2024-01-24 ASSESSMENT — ACTIVITIES OF DAILY LIVING (ADL): ADLS_ACUITY_SCORE: 35

## 2024-01-25 NOTE — ED TRIAGE NOTES
4 days of abdomen pain that circles her abdomen/back. Nausea present. Denies urinary problems. HX of divertic and feels like past episodes is stated.

## 2024-01-25 NOTE — DISCHARGE INSTRUCTIONS
Return if symptoms worsen or new symptoms develop.  Drink plenty of fluids.  Take ibuprofen or Tylenol for pain.  Take antibiotics as directed.  For severe pain take Hawks.  If increased pain blood in the stool fevers not controlled with ibuprofen or Tylenol or other symptoms present please return for further evaluation and care.

## 2024-01-25 NOTE — ED PROVIDER NOTES
History     Chief Complaint   Patient presents with    Abdominal Pain     HPI  Huong Meredith is a 33 year old female with past medical history significant for anxiety history of diverticulitis depression who presents emergency department with left lower quadrant abdominal pain.  Patient states she has had pain for the last 4 days left lateral side of her abdomen in the left lower quadrant radiating to her back.  She has had some nausea with this without any urinary symptoms this feels like an episode of diverticulitis with patient.  She was last seen in the end of December for similar complaint no obvious abnormality was found at that time.  Denies any fevers or chills has not had headache denies any chest pain or shortness of breath.  Has not had any urinary symptoms or bowel or bladder dysfunction.  Has had normal bowel movements.  Denies any swelling of the legs numbness weakness any extremity calf pain or rash.    Allergies:  Allergies   Allergen Reactions    Miconazole     Adhesive Tape Itching and Rash    Blood-Group Specific Substance Unknown     Patient has Probable Passive Anti-D.  Blood Product orders may be delayed.  Draw one red top and two purple top tubes for ALL Type and Screen/ Type and Crossmatch orders.    Buspirone Rash    Calamine Rash    Nickel Chloride  [Nickel] Rash       Problem List:    Patient Active Problem List    Diagnosis Date Noted    Encounter for triage in pregnant patient 07/09/2023     Priority: Medium    Polyp of colon 05/25/2022     Priority: Medium    Depressive disorder 04/02/2021     Priority: Medium    Seasonal allergies 07/01/2019     Priority: Medium    Anxiety 06/13/2018     Priority: Medium    Diverticulosis of large intestine 06/13/2018     Priority: Medium    Hemorrhoids, internal 06/13/2018     Priority: Medium    History of colonic polyps 06/13/2018     Priority: Medium    Morbid obesity with BMI of 40.0-44.9, adult (H) 01/28/2016     Priority: Medium    Prior  "fetal anencephaly in third trimester, antepartum 01/28/2016     Priority: Medium        Past Medical History:    No past medical history on file.    Past Surgical History:    No past surgical history on file.    Family History:    No family history on file.    Social History:  Marital Status:  Single [1]  Social History     Tobacco Use    Smoking status: Never    Smokeless tobacco: Never        Medications:    folic acid (FOLVITE) 1 MG tablet  Prenatal Vit-Fe Fumarate-FA (PRENATAL VITAMIN) 27-0.8 MG TABS          Review of Systems  As per HPI.  Physical Exam   BP: 111/77  Pulse: 103  Temp: 98.4  F (36.9  C)  Resp: 18  Height: 162.6 cm (5' 4\")  Weight: 120.2 kg (265 lb)  SpO2: 100 %      Physical Exam  Vitals and nursing note reviewed.   Constitutional:       General: She is not in acute distress.     Appearance: She is well-developed. She is not ill-appearing, toxic-appearing or diaphoretic.   HENT:      Head: Normocephalic and atraumatic.      Nose: Nose normal.      Mouth/Throat:      Mouth: Mucous membranes are moist.      Pharynx: Oropharynx is clear.   Eyes:      Conjunctiva/sclera: Conjunctivae normal.   Cardiovascular:      Rate and Rhythm: Normal rate and regular rhythm.      Pulses: Normal pulses.      Heart sounds: Normal heart sounds. No murmur heard.  Pulmonary:      Effort: Pulmonary effort is normal.      Breath sounds: Normal breath sounds. No stridor. No wheezing or rhonchi.   Abdominal:      Comments: Soft, nondistended tender to palpation mid left lateral abdomen and left lower quadrant of abdomen there is no significant flank pain no guarding or rebound present.  Bowel sounds are positive.  No masses or hernia are palpated.   Musculoskeletal:         General: No swelling or tenderness. Normal range of motion.      Cervical back: Normal range of motion and neck supple.      Right lower leg: No edema.      Left lower leg: No edema.   Skin:     General: Skin is warm and dry.      Findings: No rash. "   Neurological:      General: No focal deficit present.      Mental Status: She is alert and oriented to person, place, and time.      Sensory: No sensory deficit.      Motor: No weakness.      Coordination: Coordination normal.   Psychiatric:         Mood and Affect: Mood normal.         ED Course                 Procedures              Critical Care time:  none               Results for orders placed or performed during the hospital encounter of 01/24/24 (from the past 24 hour(s))   CBC with platelets differential    Narrative    The following orders were created for panel order CBC with platelets differential.  Procedure                               Abnormality         Status                     ---------                               -----------         ------                     CBC with platelets and d...[[id number]]  Abnormal            Final result                 Please view results for these tests on the individual orders.   Comprehensive metabolic panel   Result Value Ref Range    Sodium 143 135 - 145 mmol/L    Potassium 3.9 3.4 - 5.3 mmol/L    Carbon Dioxide (CO2) 23 22 - 29 mmol/L    Anion Gap 14 7 - 15 mmol/L    Urea Nitrogen 14.3 6.0 - 20.0 mg/dL    Creatinine 0.84 0.51 - 0.95 mg/dL    GFR Estimate >90 >60 mL/min/1.73m2    Calcium 9.4 8.6 - 10.0 mg/dL    Chloride 106 98 - 107 mmol/L    Glucose 96 70 - 99 mg/dL    Alkaline Phosphatase 110 40 - 150 U/L    AST 19 0 - 45 U/L    ALT 13 0 - 50 U/L    Protein Total 7.8 6.4 - 8.3 g/dL    Albumin 4.1 3.5 - 5.2 g/dL    Bilirubin Total 0.3 <=1.2 mg/dL   UA with Microscopic reflex to Culture    Specimen: Urine, Clean Catch   Result Value Ref Range    Color Urine Yellow Colorless, Straw, Light Yellow, Yellow    Appearance Urine Slightly Cloudy (A) Clear    Glucose Urine Negative Negative mg/dL    Bilirubin Urine Negative Negative    Ketones Urine 5 (A) Negative mg/dL    Specific Gravity Urine 1.030 1.003 - 1.035    Blood Urine Negative Negative    pH Urine 7.0 5.0  - 7.0    Protein Albumin Urine 30 (A) Negative mg/dL    Urobilinogen Urine 2.0 Normal, 2.0 mg/dL    Nitrite Urine Negative Negative    Leukocyte Esterase Urine Negative Negative    Mucus Urine Present (A) None Seen /LPF    RBC Urine 0 <=2 /HPF    WBC Urine 1 <=5 /HPF    Squamous Epithelials Urine 1 <=1 /HPF    Narrative    Urine Culture not indicated   HCG qualitative Blood   Result Value Ref Range    hCG Serum Qualitative Negative Negative   CBC with platelets and differential   Result Value Ref Range    WBC Count 16.3 (H) 4.0 - 11.0 10e3/uL    RBC Count 4.87 3.80 - 5.20 10e6/uL    Hemoglobin 13.6 11.7 - 15.7 g/dL    Hematocrit 40.8 35.0 - 47.0 %    MCV 84 78 - 100 fL    MCH 27.9 26.5 - 33.0 pg    MCHC 33.3 31.5 - 36.5 g/dL    RDW 12.8 10.0 - 15.0 %    Platelet Count 347 150 - 450 10e3/uL    % Neutrophils 69 %    % Lymphocytes 23 %    % Monocytes 6 %    % Eosinophils 2 %    % Basophils 0 %    % Immature Granulocytes 0 %    NRBCs per 100 WBC 0 <1 /100    Absolute Neutrophils 11.1 (H) 1.6 - 8.3 10e3/uL    Absolute Lymphocytes 3.7 0.8 - 5.3 10e3/uL    Absolute Monocytes 1.0 0.0 - 1.3 10e3/uL    Absolute Eosinophils 0.4 0.0 - 0.7 10e3/uL    Absolute Basophils 0.1 0.0 - 0.2 10e3/uL    Absolute Immature Granulocytes 0.1 <=0.4 10e3/uL    Absolute NRBCs 0.0 10e3/uL       Medications   sodium chloride 0.9% BOLUS 500 mL (500 mLs Intravenous $New Bag 1/24/24 2004)   ondansetron (ZOFRAN) injection 4 mg (4 mg Intravenous $Given 1/24/24 2004)     Results for orders placed or performed during the hospital encounter of 01/24/24   CT Abdomen Pelvis w Contrast    Narrative    EXAM: CT ABDOMEN PELVIS W CONTRAST  LOCATION: [facility]  DATE: 1/24/2024    INDICATION: Left lower quadrant abdominal pain.  COMPARISON: 12/26/2023  TECHNIQUE: CT scan of the abdomen and pelvis was performed following injection of IV contrast. Multiplanar reformats were obtained. Exam limited by high KV technique  CONTRAST: 100 mL  Isovue-370    FINDINGS:   LOWER CHEST: No basilar infiltrates    HEPATOBILIARY: No biliary dilatation    PANCREAS: Unremarkable    SPLEEN: Unremarkable    ADRENAL GLANDS: Unremarkable    KIDNEYS/BLADDER: No hydronephrosis. Decompressed bladder.    BOWEL: Moderate inflammatory stranding about the mid descending colon with underlying inflamed diverticulum. No fluid collections. Additional scattered uncomplicated colonic diverticula. Normal caliber appendix.    LYMPH NODES: No significant retroperitoneal adenopathy.    VASCULATURE: No abdominal aortic aneurysm. Patent portal vein.    PELVIC ORGANS: No free fluid.    MUSCULOSKELETAL: No acute bony abnormalities.      Impression    IMPRESSION:   1.  Descending colonic acute diverticulitis, without fluid collections.        Assessments & Plan (with Medical Decision Making) records reviewed.  Past medical history medications and allergies reviewed.  Patient's recent ED visit for abdominal pain was reviewed on 12/26/2023.  Labs were obtained.  I dependently reviewed and interpreted labs.  Patient was given a fluid bolus and Zofran.  Patient's white count was elevated at 16.3 hemoglobin 13.6.  Platelet count was 347.  There was a left shift.  Comprehensive metabolic panel without some abnormality.  Pregnancy test was negative.  UA with 5 ketones 30 protein.  No other significant abnormality.  Due to patient's history presentation and exam a CT scan abdomen pelvis with contrast was obtained.  Patient was agree with the things.  I dependently reviewed this.  I agree with his findings of descending colonic acute diverticulitis without fluid collections.  Findings discussed in detail with patient.  I would treat her with Cipro and Flagyl as well as give her a few pain pills for any breakthrough pain.  She should just take ibuprofen and Tylenol for pain.  She should return if things abdominal pain fevers not controlled with ibuprofen or Tylenol blood in her stool or other  symptoms present.  Patient feels comfortable to plan at this time.     I have reviewed the nursing notes.    I have reviewed the findings, diagnosis, plan and need for follow up with the patient.           Discharge Medication List as of 1/24/2024  9:28 PM        START taking these medications    Details   ciprofloxacin (CIPRO) 500 MG tablet Take 1 tablet (500 mg) by mouth 2 times daily, Disp-14 tablet, R-0, [facility]      HYDROcodone-acetaminophen (NORCO) 5-325 MG tablet Take 1 tablet by mouth every 6 hours as needed for breakthrough pain, Disp-4 tablet, R-0, [facility]      metroNIDAZOLE (FLAGYL) 500 MG tablet Take 1 tablet (500 mg) by mouth 3 times daily, Disp-30 tablet, R-0, [facility]             Final diagnoses:   LLQ abdominal pain   Acute diverticulitis       1/24/2024   [facility] EMERGENCY DEPT       Oren, Geovani Moya MD  01/25/24 9038

## 2024-03-20 PROCEDURE — 99284 EMERGENCY DEPT VISIT MOD MDM: CPT | Performed by: STUDENT IN AN ORGANIZED HEALTH CARE EDUCATION/TRAINING PROGRAM

## 2024-03-20 PROCEDURE — 99283 EMERGENCY DEPT VISIT LOW MDM: CPT | Performed by: STUDENT IN AN ORGANIZED HEALTH CARE EDUCATION/TRAINING PROGRAM

## 2024-03-20 ASSESSMENT — COLUMBIA-SUICIDE SEVERITY RATING SCALE - C-SSRS
6. HAVE YOU EVER DONE ANYTHING, STARTED TO DO ANYTHING, OR PREPARED TO DO ANYTHING TO END YOUR LIFE?: NO
1. IN THE PAST MONTH, HAVE YOU WISHED YOU WERE DEAD OR WISHED YOU COULD GO TO SLEEP AND NOT WAKE UP?: NO
2. HAVE YOU ACTUALLY HAD ANY THOUGHTS OF KILLING YOURSELF IN THE PAST MONTH?: NO

## 2024-03-21 ENCOUNTER — HOSPITAL ENCOUNTER (EMERGENCY)
Facility: CLINIC | Age: 34
Discharge: HOME | End: 2024-03-21
Attending: STUDENT IN AN ORGANIZED HEALTH CARE EDUCATION/TRAINING PROGRAM | Admitting: STUDENT IN AN ORGANIZED HEALTH CARE EDUCATION/TRAINING PROGRAM
Payer: COMMERCIAL

## 2024-03-21 VITALS
WEIGHT: 265 LBS | DIASTOLIC BLOOD PRESSURE: 65 MMHG | TEMPERATURE: 98.4 F | RESPIRATION RATE: 18 BRPM | SYSTOLIC BLOOD PRESSURE: 99 MMHG | OXYGEN SATURATION: 98 % | BODY MASS INDEX: 45.24 KG/M2 | HEART RATE: 89 BPM | HEIGHT: 64 IN

## 2024-03-21 DIAGNOSIS — M54.42 ACUTE BILATERAL LOW BACK PAIN WITH BILATERAL SCIATICA: ICD-10-CM

## 2024-03-21 DIAGNOSIS — M54.41 ACUTE BILATERAL LOW BACK PAIN WITH BILATERAL SCIATICA: ICD-10-CM

## 2024-03-21 PROCEDURE — 250N000011 HC RX IP 250 OP 636: Performed by: STUDENT IN AN ORGANIZED HEALTH CARE EDUCATION/TRAINING PROGRAM

## 2024-03-21 PROCEDURE — 250N000013 HC RX MED GY IP 250 OP 250 PS 637: Performed by: STUDENT IN AN ORGANIZED HEALTH CARE EDUCATION/TRAINING PROGRAM

## 2024-03-21 PROCEDURE — 96372 THER/PROPH/DIAG INJ SC/IM: CPT | Performed by: STUDENT IN AN ORGANIZED HEALTH CARE EDUCATION/TRAINING PROGRAM

## 2024-03-21 RX ORDER — HYDROCODONE BITARTRATE AND ACETAMINOPHEN 5; 325 MG/1; MG/1
1 TABLET ORAL EVERY 6 HOURS PRN
Qty: 4 TABLET | Refills: 0 | Status: SHIPPED | OUTPATIENT
Start: 2024-03-21 | End: 2024-04-27

## 2024-03-21 RX ORDER — CYCLOBENZAPRINE HCL 10 MG
10 TABLET ORAL 3 TIMES DAILY PRN
Qty: 15 TABLET | Refills: 0 | Status: SHIPPED | OUTPATIENT
Start: 2024-03-21 | End: 2024-04-27

## 2024-03-21 RX ORDER — OXYCODONE AND ACETAMINOPHEN 5; 325 MG/1; MG/1
1 TABLET ORAL ONCE
Status: COMPLETED | OUTPATIENT
Start: 2024-03-21 | End: 2024-03-21

## 2024-03-21 RX ORDER — KETOROLAC TROMETHAMINE 15 MG/ML
15 INJECTION, SOLUTION INTRAMUSCULAR; INTRAVENOUS ONCE
Status: COMPLETED | OUTPATIENT
Start: 2024-03-21 | End: 2024-03-21

## 2024-03-21 RX ADMIN — OXYCODONE HYDROCHLORIDE AND ACETAMINOPHEN 1 TABLET: 5; 325 TABLET ORAL at 01:54

## 2024-03-21 RX ADMIN — KETOROLAC TROMETHAMINE 15 MG: 15 INJECTION, SOLUTION INTRAMUSCULAR; INTRAVENOUS at 01:54

## 2024-03-21 ASSESSMENT — ACTIVITIES OF DAILY LIVING (ADL)
ADLS_ACUITY_SCORE: 35
ADLS_ACUITY_SCORE: 35

## 2024-03-21 NOTE — DISCHARGE INSTRUCTIONS
Your symptoms are likely due to sciatica.  This will eventually improve with time, but it can cause a lot of chronic issues.  Is important that you avoid prolonged immobility as this can make symptoms worse.  I have placed a referral to physical therapy.  You should begin doing some of the exercises provided at home to you can get in with physical therapy.  You should take ibuprofen on a scheduled basis for the next couple of days until your symptoms improved.  You can use the prescribed pain medication for severe pain only.  Use the muscle relaxers to help sleep.  Be aware, that these medications can make you sleepy, so do not drive, drink alcohol or operate machinery while on these medications.  Follow-up with primary care when you are able.  A referral has been given.  If you develop fever, loss of bowel or bladder function, inability to walk, severe pain, or any other new or concerning symptoms return to the ER.

## 2024-03-21 NOTE — ED NOTES
Pt states her pain is 3/10, up and moving more comfortably, denies any other complaints, SO @ bedside, call light @ bedside, will continue to monitor and assist as needed.

## 2024-03-21 NOTE — ED TRIAGE NOTES
Low back pain that radiates to both hips and down both legs.  Patient started four days ago on the left side and is now on both sides and worsening pain.  Patient took Tylenol at 2130 tonight.     Triage Assessment (Adult)       Row Name 03/20/24 2149          Triage Assessment    Airway WDL WDL        Respiratory WDL    Respiratory WDL WDL        Skin Circulation/Temperature WDL    Skin Circulation/Temperature WDL WDL        Cardiac WDL    Cardiac WDL WDL        Peripheral/Neurovascular WDL    Peripheral Neurovascular WDL WDL        Cognitive/Neuro/Behavioral WDL    Cognitive/Neuro/Behavioral WDL WDL

## 2024-03-21 NOTE — ED PROVIDER NOTES
History     Chief Complaint   Patient presents with    Back Pain     HPI  Huong Meredith is a 33 year old female who has anxiety, morbid obesity who presents to the emergency department for evaluation of back pain.  Patient states that 4 days ago she rolled out of bed and began having pain in the left side of her back.  She states that pain radiates down the back and side of her left leg down to her ankle.  Tonight she then began having some pain in the right side of her back and it is radiating similarly down the back of the right leg.  She is able to walk, but it is painful.  She denies numbness.  She denies weakness.  She denies loss of bowel or bladder function.  She denies falls or trauma.  Pain is worse with movements.  To help when she lays on her right side.  She has had back pain in the past, but nothing like this before.  She denies fever.  She has been taking Tylenol without any improvement in symptoms.    Allergies:  Allergies   Allergen Reactions    Miconazole     Adhesive Tape Itching and Rash    Blood-Group Specific Substance Unknown     Patient has Probable Passive Anti-D.  Blood Product orders may be delayed.  Draw one red top and two purple top tubes for ALL Type and Screen/ Type and Crossmatch orders.    Buspirone Rash    Calamine Rash    Nickel Chloride  [Nickel] Rash       Problem List:    Patient Active Problem List    Diagnosis Date Noted    Encounter for triage in pregnant patient 07/09/2023     Priority: Medium    Polyp of colon 05/25/2022     Priority: Medium    Depressive disorder 04/02/2021     Priority: Medium    Seasonal allergies 07/01/2019     Priority: Medium    Anxiety 06/13/2018     Priority: Medium    Diverticulosis of large intestine 06/13/2018     Priority: Medium    Hemorrhoids, internal 06/13/2018     Priority: Medium    History of colonic polyps 06/13/2018     Priority: Medium    Morbid obesity with BMI of 40.0-44.9, adult (H) 01/28/2016     Priority: Medium    Prior fetal  "anencephaly in third trimester, antepartum 01/28/2016     Priority: Medium        Past Medical History:    No past medical history on file.    Past Surgical History:    No past surgical history on file.    Family History:    No family history on file.    Social History:  Marital Status:  Single [1]  Social History     Tobacco Use    Smoking status: Never    Smokeless tobacco: Never        Medications:    cyclobenzaprine (FLEXERIL) 10 MG tablet  HYDROcodone-acetaminophen (NORCO) 5-325 MG tablet  ciprofloxacin (CIPRO) 500 MG tablet  folic acid (FOLVITE) 1 MG tablet  HYDROcodone-acetaminophen (NORCO) 5-325 MG tablet  metroNIDAZOLE (FLAGYL) 500 MG tablet          Review of Systems  See HPI  Physical Exam   BP: 113/80  Pulse: 89  Temp: 98.4  F (36.9  C)  Resp: 18  Height: 162.6 cm (5' 4\")  Weight: 120.2 kg (265 lb)  SpO2: 98 %      Physical Exam  BP 99/65   Pulse 89   Temp 98.4  F (36.9  C) (Tympanic)   Resp 18   Ht 1.626 m (5' 4\")   Wt 120.2 kg (265 lb)   LMP 01/10/2024   SpO2 98%   BMI 45.49 kg/m    General: Uncomfortable appearing and lying on her right side.  She is alert and conversant  Head: atraumatic  Nose: no rhinorrhea or epistaxis  Ears: no external auditory canal discharge or bleeding.    Eyes: Sclera nonicteric. Conjunctiva noninjected. PERRL, EOMI  Mouth: no tonsillar erythema, edema, or exudate.  Moist mucous membranes  Neck: supple, moving spontaneously no midline cervical tenderness  Lungs: No increased work of breathing.  Clear to auscultation bilaterally.  CV: RRR, peripheral pulses palpable and symmetric  Abdomen: soft, nt, nd, no guarding or rebound.   Musculoskeletal: No thoracic or lumbar spinal tenderness.  There is paraspinal muscle tenderness bilaterally in the lumbar region.  It is worse on the left.  There is pain on palpation of the sciatic nerve bilaterally.  Skin: no rash or diaphoresis  Neuro: CN II-XII grossly intact, strength 5/5 in UE and LEs bilaterally, sensation intact to " light touch in UE and LEs bilaterally; normal gait    ED Course        Procedures             Critical Care time:  none             No results found for this or any previous visit (from the past 24 hour(s)).    Medications   ketorolac (TORADOL) injection 15 mg (15 mg Intramuscular $Given 3/21/24 0154)   oxyCODONE-acetaminophen (PERCOCET) 5-325 MG per tablet 1 tablet (1 tablet Oral $Given 3/21/24 0154)       Assessments & Plan (with Medical Decision Making)     I have reviewed the nursing notes.    I have reviewed the findings, diagnosis, plan and need for follow up with the patient.          Medical Decision Making  Huong Meredith is a 33 year old female who has anxiety, morbid obesity who presents to the emergency department for evaluation of back pain.  Vital signs reviewed and reassuring.  Patient has normal neurological exam.  She has no red flag symptoms.  I suspect musculoskeletal back pain with sciatic component.  She is given IM Toradol and Percocet with improvement in her symptoms.  When I went to reassess the patient, she was standing and stated she felt much more comfortable.  Still having some mild pain, but is much more tolerable.  I had a long discussion with the patient regarding back pain.  Anticipatory guidance was discussed.  I recommended that she avoid prolonged immobility because this can make symptoms worse.  A physical therapy referral was provided.  She is provided with a list of exercises to do in the meantime.  She is given a primary care referral.  She is given muscle relaxers with instructions to schedule ibuprofen.  She is given a very small prescription of Norco for severe pain.  Explicit return precautions were discussed.  All questions answered.  Patient discharged in stable condition.        Discharge Medication List as of 3/21/2024  2:52 AM        START taking these medications    Details   cyclobenzaprine (FLEXERIL) 10 MG tablet Take 1 tablet (10 mg) by mouth 3 times daily as  needed for muscle spasms, Disp-15 tablet, R-0, [facility]      !! HYDROcodone-acetaminophen (NORCO) 5-325 MG tablet Take 1 tablet by mouth every 6 hours as needed for severe pain, Disp-4 tablet, R-0, [facility]       !! - Potential duplicate medications found. Please discuss with provider.          Final diagnoses:   Acute bilateral low back pain with bilateral sciatica       3/20/2024   [facility] EMERGENCY DEPT       Jung Estrella MD  03/21/24 8122

## 2024-03-21 NOTE — ED NOTES
Pt states she has had bilateral back pain that radiates down both legs x 4 days, no relief from tylenol.

## 2024-03-31 NOTE — PROGRESS NOTES
PHYSICAL THERAPY EVALUATION  Type of Visit: Evaluation    Patient Name: Huong Meredith, 33 year old, female  Todays Date: Apr 1, 2024    Referral Diagnosis:  Acute bilateral low back pain with bilateral sciatica    Relevant Medical History:   Patient Active Problem List   Diagnosis    Anxiety    Diverticulosis of large intestine    Hemorrhoids, internal    History of colonic polyps    Morbid obesity with BMI of 40.0-44.9, adult (H)    Depressive disorder    Seasonal allergies    Prior fetal anencephaly in third trimester, antepartum    Polyp of colon    Encounter for triage in pregnant patient    Acute bilateral low back pain with bilateral sciatica         Subjective:    Reason for referral/chief complaint: low back pain,     Pt isnt sure how they injured the back. She notes she got out of her bad and the pain radiates into the hip and ankle. She did go to the ER-she tried to get out of bed the next day and radiated down the Right side next and was like that for the following 5 days, She notes not as much lower back pain now, she can sit now and the hip is getting better.     She notes no pain in the hip while standing but in the ankle.     She notes going from sitting to sitting is stretching and hurts the hip.     She notes this was the first time to this extent. She did have sciatica like pain during her pregnancy.         Onset, Mechanism of Injury/Issue: none       Pain: Current: 1/10 Worst: 6/10    Pain Description: sharp in back and hip, caleb horse in the leg and feels like heartbeat in the ankle, soreness     Changes in symptoms since onset: improved    Effects on Sleep: She notes the last 5 days of sleep have been ok, she notes sometimes rolling can be hard, pain is not waking her up in the middle of the night     Aggravating Factors: going from seated to standing position     Easing Factors: Rest, pillow between knees, heating with massage feature on hip,     Other related Services/Treatments:  None    Review of Systems: negative      Occupation: stay at home mom, 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 Pt notes pressure in L lower lumbar area, decreased trunk rotation       Other Observations (Muscle Atrophy, Scars, Swelling): None      Lumbar ROM:     Lumbar Flexion 50% pulling feeling, feels into the L lumbar side   Lumbar Extension 50% radites across back, pulling sensation    Left AROM Right AROM   Lumbar Sidebend 50% pulling sensation on the Left 75% no pain    Lumbar Rotation 75% no pain 75% pulling sensation    Lumbar Sideglide Not as bad as the R, some tension  Feels tension middle of the back toward left     Fam Evaluation: Assessment of repeated motions and static tests and posture correction:  Flexion increased radiation into the glut, extension more so stays in the back.        Hip ROM: Full ROM, both hips    Hip and Knee Flexibility: Within Normal Limits      Ankle and Foot Flexibility: Within Normal Limits    Beighton Hypermobility Scale    B elbow (2)    B knees (2)    B thumbs (2)    B small finger HE (2)    Bend and touch floor with flat palms (1)    Score: (0-9)    :         STRENGTH: Within Normal Limits       Left Right   L4 (Quad)   2+ normal   2+ normal   S1 (Achilles)   2+ normal   1+ trace, or seen only with reinforcement   Clonus Negative Negative         Neurological Testing:    CORD SIGNS: WNL    NEURAL TENSION:   Slump Test:  nt    Straight Leg Raise: negative    SPECIAL TESTS    LUMBAR/HIP Special Tests:        Hip:     Hip intra: MARTHA: LSK Postive Neg: Negative, FADIR: LSK Postive Neg: Negative, and Scour: LSK Postive Neg: Negative     SIJ:   SIJ Tests: Thigh Thrust: LSK Postive Neg: Negative              Assessment & Plan   CLINICAL IMPRESSIONS  Medical Diagnosis: Low Back Pain    Treatment Diagnosis: Low back pain   Impression/Assessment: Patient is a 33 year old female with L sided low back pain complaints consistent with disc pathology.  The following significant findings have been identified: Pain, Decreased ROM/flexibility, and Decreased strength. These impairments interfere with their ability to perform self care tasks, work tasks, household chores, household mobility, and community mobility as compared to previous level of function.     Clinical Decision Making (Complexity):  Clinical Presentation: Stable/Uncomplicated  Clinical Presentation Rationale: based on medical and personal factors listed in PT evaluation  Clinical Decision Making (Complexity): Low complexity    PLAN OF CARE  Treatment Interventions:  Interventions: Manual Therapy, Neuromuscular Re-education, Therapeutic Activity, Therapeutic Exercise, Self-Care/Home Management    Long Term Goals     PT Goal 1  Goal Identifier: Bending  Goal Description: Pt will be able to bend over to 75% of full AROM with minimal symptoms  Rationale: to maximize safety and independence with performance of ADLs and functional tasks;to maximize safety and independence within the home;to maximize safety and independence within the community;to maximize safety and independence with self cares      Frequency of Treatment: 1x per week for 3 weeks then every other week after that  Duration of Treatment: 90 days    Recommended Referrals to Other Professionals:  None  Education Assessment:        Risks and benefits of evaluation/treatment have been explained.   Patient/Family/caregiver agrees with Plan of Care.     Evaluation Time:     PT Eval, Low Complexity Minutes  (52232): 30       Signing Clinician: Nile Strom, PT      [facility]                                                                                   OUTPATIENT PHYSICAL THERAPY    HEP:     Exercise Name: Supine Sciatic Nerve Sliders, Sets: 2-3 - Reps: 10, Notes: CAN DO IN BED BEFORE YOU WAKE UP OR WHEN YOU GO TO BED  Exercise Name: Prone On Elbows, Sets: 2-3 - Reps: 10 - Sessions: 1-3, Notes: CAN JUST DO IN STANDING AS WELL.   Exercise Name: Standing Sideglide, Sets: 2-3 - Reps: 10, Notes: STAND WITH LEFT ARM TUCKED IN BY SHOULDER AGAINST WALL. OTHER HAND ON HIP. GENTLY PUSH HIP IN TOWARDS THE WALL. DONT PUSH ANYTHING THAT IS PAINFUL.   Exercise Name: Side Stepping With Theraband, Sets: 3-4 - Reps: 10 steps , Notes: 3 DAYS A WEEK. DONT DO SQUAT POSITION. KEEP FEET POINTED FORWARD      PLAN OF TREATMENT FOR OUTPATIENT REHABILITATION   Patient's Last Name, First Name, DEIDRA MeredithHuong  M YOB: 1990   Provider's Name   [facility]   Medical Record No.  [id number]     Onset Date: 03/21/24 (Date of Referral)  Start of Care Date: 04/01/24     Medical Diagnosis:  Low Back Pain      PT Treatment Diagnosis:  Low back pain Plan of Treatment  Frequency/Duration: 1x per week for 3 weeks then every other week after that/ 90 days    Certification date from 04/01/24 to 06/30/24         See note for plan of treatment details and functional goals     Nile Storm, PT                         I CERTIFY THE NEED FOR THESE SERVICES FURNISHED UNDER        THIS PLAN OF TREATMENT AND WHILE UNDER MY CARE     (Physician attestation of this document indicates review and certification of the therapy plan).              Referring Provider:  Jung Estrella    Initial Assessment  See Epic Evaluation- Start of Care Date: 04/01/24

## 2024-04-01 ENCOUNTER — THERAPY VISIT (OUTPATIENT)
Dept: PHYSICAL THERAPY | Facility: CLINIC | Age: 34
End: 2024-04-01
Attending: STUDENT IN AN ORGANIZED HEALTH CARE EDUCATION/TRAINING PROGRAM
Payer: COMMERCIAL

## 2024-04-01 DIAGNOSIS — M54.42 ACUTE BILATERAL LOW BACK PAIN WITH BILATERAL SCIATICA: ICD-10-CM

## 2024-04-01 DIAGNOSIS — M54.41 ACUTE BILATERAL LOW BACK PAIN WITH BILATERAL SCIATICA: ICD-10-CM

## 2024-04-01 PROCEDURE — 97161 PT EVAL LOW COMPLEX 20 MIN: CPT | Mod: GP

## 2024-04-01 PROCEDURE — 97110 THERAPEUTIC EXERCISES: CPT | Mod: GP

## 2024-04-23 ENCOUNTER — HOSPITAL ENCOUNTER (EMERGENCY)
Facility: CLINIC | Age: 34
Discharge: HOME | End: 2024-04-23
Attending: EMERGENCY MEDICINE | Admitting: EMERGENCY MEDICINE
Payer: COMMERCIAL

## 2024-04-23 ENCOUNTER — APPOINTMENT (OUTPATIENT)
Dept: CT IMAGING | Facility: CLINIC | Age: 34
End: 2024-04-23
Attending: EMERGENCY MEDICINE
Payer: COMMERCIAL

## 2024-04-23 VITALS
OXYGEN SATURATION: 98 % | TEMPERATURE: 97.6 F | SYSTOLIC BLOOD PRESSURE: 117 MMHG | HEART RATE: 94 BPM | DIASTOLIC BLOOD PRESSURE: 69 MMHG | RESPIRATION RATE: 18 BRPM

## 2024-04-23 DIAGNOSIS — K57.92 ACUTE DIVERTICULITIS: ICD-10-CM

## 2024-04-23 LAB
ALBUMIN SERPL BCG-MCNC: 3.8 G/DL (ref 3.5–5.2)
ALBUMIN UR-MCNC: NEGATIVE MG/DL
ALP SERPL-CCNC: 118 U/L (ref 40–150)
ALT SERPL W P-5'-P-CCNC: 13 U/L (ref 0–50)
ANION GAP SERPL CALCULATED.3IONS-SCNC: 9 MMOL/L (ref 7–15)
APPEARANCE UR: CLEAR
AST SERPL W P-5'-P-CCNC: 17 U/L (ref 0–45)
BASOPHILS # BLD AUTO: 0.1 10E3/UL (ref 0–0.2)
BASOPHILS NFR BLD AUTO: 0 %
BILIRUB SERPL-MCNC: 0.2 MG/DL
BILIRUB UR QL STRIP: NEGATIVE
BUN SERPL-MCNC: 10.4 MG/DL (ref 6–20)
CALCIUM SERPL-MCNC: 9.1 MG/DL (ref 8.6–10)
CAOX CRY #/AREA URNS HPF: ABNORMAL /HPF
CHLORIDE SERPL-SCNC: 104 MMOL/L (ref 98–107)
COLOR UR AUTO: YELLOW
CREAT SERPL-MCNC: 0.82 MG/DL (ref 0.51–0.95)
DEPRECATED HCO3 PLAS-SCNC: 28 MMOL/L (ref 22–29)
EGFRCR SERPLBLD CKD-EPI 2021: >90 ML/MIN/1.73M2
EOSINOPHIL # BLD AUTO: 0.3 10E3/UL (ref 0–0.7)
EOSINOPHIL NFR BLD AUTO: 2 %
ERYTHROCYTE [DISTWIDTH] IN BLOOD BY AUTOMATED COUNT: 13.1 % (ref 10–15)
GLUCOSE SERPL-MCNC: 78 MG/DL (ref 70–99)
GLUCOSE UR STRIP-MCNC: NEGATIVE MG/DL
HCG UR QL: NEGATIVE
HCT VFR BLD AUTO: 42.3 % (ref 35–47)
HGB BLD-MCNC: 14.1 G/DL (ref 11.7–15.7)
HGB UR QL STRIP: NEGATIVE
HOLD SPECIMEN: NORMAL
HOLD SPECIMEN: NORMAL
IMM GRANULOCYTES # BLD: 0.1 10E3/UL
IMM GRANULOCYTES NFR BLD: 0 %
KETONES UR STRIP-MCNC: 5 MG/DL
LEUKOCYTE ESTERASE UR QL STRIP: NEGATIVE
LYMPHOCYTES # BLD AUTO: 3.4 10E3/UL (ref 0.8–5.3)
LYMPHOCYTES NFR BLD AUTO: 21 %
MCH RBC QN AUTO: 28.3 PG (ref 26.5–33)
MCHC RBC AUTO-ENTMCNC: 33.3 G/DL (ref 31.5–36.5)
MCV RBC AUTO: 85 FL (ref 78–100)
MONOCYTES # BLD AUTO: 0.9 10E3/UL (ref 0–1.3)
MONOCYTES NFR BLD AUTO: 6 %
MUCOUS THREADS #/AREA URNS LPF: PRESENT /LPF
NEUTROPHILS # BLD AUTO: 11.2 10E3/UL (ref 1.6–8.3)
NEUTROPHILS NFR BLD AUTO: 70 %
NITRATE UR QL: NEGATIVE
NRBC # BLD AUTO: 0 10E3/UL
NRBC BLD AUTO-RTO: 0 /100
PH UR STRIP: 5 [PH] (ref 5–7)
PLATELET # BLD AUTO: 348 10E3/UL (ref 150–450)
POTASSIUM SERPL-SCNC: 3.5 MMOL/L (ref 3.4–5.3)
PROT SERPL-MCNC: 7.6 G/DL (ref 6.4–8.3)
RBC # BLD AUTO: 4.99 10E6/UL (ref 3.8–5.2)
RBC URINE: 2 /HPF
SODIUM SERPL-SCNC: 141 MMOL/L (ref 135–145)
SP GR UR STRIP: 1.03 (ref 1–1.03)
SQUAMOUS EPITHELIAL: 2 /HPF
UROBILINOGEN UR STRIP-MCNC: 2 MG/DL
WBC # BLD AUTO: 16 10E3/UL (ref 4–11)
WBC URINE: <1 /HPF

## 2024-04-23 PROCEDURE — 74177 CT ABD & PELVIS W/CONTRAST: CPT

## 2024-04-23 PROCEDURE — 81025 URINE PREGNANCY TEST: CPT | Performed by: EMERGENCY MEDICINE

## 2024-04-23 PROCEDURE — 250N000011 HC RX IP 250 OP 636: Performed by: EMERGENCY MEDICINE

## 2024-04-23 PROCEDURE — 80053 COMPREHEN METABOLIC PANEL: CPT | Performed by: EMERGENCY MEDICINE

## 2024-04-23 PROCEDURE — 85025 COMPLETE CBC W/AUTO DIFF WBC: CPT | Performed by: EMERGENCY MEDICINE

## 2024-04-23 PROCEDURE — 96361 HYDRATE IV INFUSION ADD-ON: CPT | Performed by: EMERGENCY MEDICINE

## 2024-04-23 PROCEDURE — 99285 EMERGENCY DEPT VISIT HI MDM: CPT | Mod: 25 | Performed by: EMERGENCY MEDICINE

## 2024-04-23 PROCEDURE — 96374 THER/PROPH/DIAG INJ IV PUSH: CPT | Mod: 59 | Performed by: EMERGENCY MEDICINE

## 2024-04-23 PROCEDURE — 250N000009 HC RX 250: Performed by: EMERGENCY MEDICINE

## 2024-04-23 PROCEDURE — 258N000003 HC RX IP 258 OP 636: Performed by: EMERGENCY MEDICINE

## 2024-04-23 PROCEDURE — 81001 URINALYSIS AUTO W/SCOPE: CPT | Performed by: EMERGENCY MEDICINE

## 2024-04-23 PROCEDURE — 99284 EMERGENCY DEPT VISIT MOD MDM: CPT | Performed by: EMERGENCY MEDICINE

## 2024-04-23 PROCEDURE — 36415 COLL VENOUS BLD VENIPUNCTURE: CPT | Performed by: EMERGENCY MEDICINE

## 2024-04-23 RX ORDER — IOPAMIDOL 755 MG/ML
100 INJECTION, SOLUTION INTRAVASCULAR ONCE
Status: COMPLETED | OUTPATIENT
Start: 2024-04-23 | End: 2024-04-23

## 2024-04-23 RX ORDER — ONDANSETRON 2 MG/ML
4 INJECTION INTRAMUSCULAR; INTRAVENOUS ONCE
Status: COMPLETED | OUTPATIENT
Start: 2024-04-23 | End: 2024-04-23

## 2024-04-23 RX ORDER — KETOROLAC TROMETHAMINE 15 MG/ML
10 INJECTION, SOLUTION INTRAMUSCULAR; INTRAVENOUS
Status: COMPLETED | OUTPATIENT
Start: 2024-04-23 | End: 2024-04-23

## 2024-04-23 RX ORDER — SODIUM CHLORIDE, SODIUM LACTATE, POTASSIUM CHLORIDE, CALCIUM CHLORIDE 600; 310; 30; 20 MG/100ML; MG/100ML; MG/100ML; MG/100ML
1000 INJECTION, SOLUTION INTRAVENOUS CONTINUOUS
Status: DISCONTINUED | OUTPATIENT
Start: 2024-04-23 | End: 2024-04-24 | Stop reason: HOSPADM

## 2024-04-23 RX ORDER — HYDROMORPHONE HYDROCHLORIDE 1 MG/ML
0.5 INJECTION, SOLUTION INTRAMUSCULAR; INTRAVENOUS; SUBCUTANEOUS
Status: DISCONTINUED | OUTPATIENT
Start: 2024-04-23 | End: 2024-04-24 | Stop reason: HOSPADM

## 2024-04-23 RX ADMIN — SODIUM CHLORIDE, POTASSIUM CHLORIDE, SODIUM LACTATE AND CALCIUM CHLORIDE 500 ML: 600; 310; 30; 20 INJECTION, SOLUTION INTRAVENOUS at 21:30

## 2024-04-23 RX ADMIN — IOPAMIDOL 100 ML: 755 INJECTION, SOLUTION INTRAVENOUS at 22:42

## 2024-04-23 RX ADMIN — KETOROLAC TROMETHAMINE 10 MG: 15 INJECTION, SOLUTION INTRAMUSCULAR; INTRAVENOUS at 21:34

## 2024-04-23 RX ADMIN — SODIUM CHLORIDE 72 ML: 9 INJECTION, SOLUTION INTRAVENOUS at 22:42

## 2024-04-23 ASSESSMENT — ENCOUNTER SYMPTOMS
PSYCHIATRIC NEGATIVE: 1
NEUROLOGICAL NEGATIVE: 1
MUSCULOSKELETAL NEGATIVE: 1
EYES NEGATIVE: 1
HEMATOLOGIC/LYMPHATIC NEGATIVE: 1
ABDOMINAL PAIN: 1
RESPIRATORY NEGATIVE: 1
CONSTITUTIONAL NEGATIVE: 1
CARDIOVASCULAR NEGATIVE: 1
ALLERGIC/IMMUNOLOGIC NEGATIVE: 1
ENDOCRINE NEGATIVE: 1

## 2024-04-23 ASSESSMENT — COLUMBIA-SUICIDE SEVERITY RATING SCALE - C-SSRS
6. HAVE YOU EVER DONE ANYTHING, STARTED TO DO ANYTHING, OR PREPARED TO DO ANYTHING TO END YOUR LIFE?: NO
2. HAVE YOU ACTUALLY HAD ANY THOUGHTS OF KILLING YOURSELF IN THE PAST MONTH?: NO
1. IN THE PAST MONTH, HAVE YOU WISHED YOU WERE DEAD OR WISHED YOU COULD GO TO SLEEP AND NOT WAKE UP?: NO

## 2024-04-23 ASSESSMENT — ACTIVITIES OF DAILY LIVING (ADL)
ADLS_ACUITY_SCORE: 35
ADLS_ACUITY_SCORE: 35

## 2024-04-24 NOTE — DISCHARGE INSTRUCTIONS
1) Your evaluation today revealed that your pain may be related to diverticulitis.  Imaging today revealed diverticulitis involving the descending colon and the left side of the abdomen which is similar to the findings on CT imaging 3 months prior.  Although your pain began in the right side and was more in the midline lower suprapubic region we have discussed that based on CT findings today we have elected to treat you given this is your third episode of diverticulitis in the last 3 years.  Based on your previous treatment 3 months ago we have elected to treat you with Augmentin twice a day, 7 days    2) We have discussed follow-up with your clinic provider once you see your primary care provider including discussion with GI and neurosurgery whether further intervention may be required given your recurrence of diverticulitis with 3 episodes in the last 3 years

## 2024-04-24 NOTE — ED PROVIDER NOTES
History     Chief Complaint   Patient presents with    Abdominal Pain     HPI  Huong Meredith is a 33 year old female who presents for evaluation with right-sided abdominal cramping.    Reviewed the medical record.  Visit on 3/21/24.  Patient has a previous diagnosis of anxiety, internal hemorrhoids depression and seasonal allergies.      Patient current prescribed  medications were reviewed.    On examination patient reports she developed periumbilical pain now localized to the right lower quadrant the last 24 hours.  She finished her period 3 days ago.  No prior abdominal surgery.  Pain is currently 4-6 out of 10.  No nausea.  Did have a cheeseburger for dinner tonight.  Arrived by car from home for further assessment with concern about gallbladder or appendix.      Allergies:  Allergies   Allergen Reactions    Miconazole     Adhesive Tape Itching and Rash    Blood-Group Specific Substance Unknown     Patient has Probable Passive Anti-D.  Blood Product orders may be delayed.  Draw one red top and two purple top tubes for ALL Type and Screen/ Type and Crossmatch orders.    Buspirone Rash    Calamine Rash    Nickel Chloride  [Nickel] Rash       Problem List:    Patient Active Problem List    Diagnosis Date Noted    Acute bilateral low back pain with bilateral sciatica 04/01/2024     Priority: Medium    Encounter for triage in pregnant patient 07/09/2023     Priority: Medium    Polyp of colon 05/25/2022     Priority: Medium    Depressive disorder 04/02/2021     Priority: Medium    Seasonal allergies 07/01/2019     Priority: Medium    Anxiety 06/13/2018     Priority: Medium    Diverticulosis of large intestine 06/13/2018     Priority: Medium    Hemorrhoids, internal 06/13/2018     Priority: Medium    History of colonic polyps 06/13/2018     Priority: Medium    Morbid obesity with BMI of 40.0-44.9, adult (H) 01/28/2016     Priority: Medium    Prior fetal anencephaly in third trimester, antepartum 01/28/2016      Priority: Medium        Past Medical History:    No past medical history on file.    Past Surgical History:    No past surgical history on file.    Family History:    No family history on file.    Social History:  Marital Status:  Single [1]  Social History     Tobacco Use    Smoking status: Never    Smokeless tobacco: Never        Medications:    amoxicillin-clavulanate (AUGMENTIN) 875-125 MG tablet  ciprofloxacin (CIPRO) 500 MG tablet  cyclobenzaprine (FLEXERIL) 10 MG tablet  folic acid (FOLVITE) 1 MG tablet  HYDROcodone-acetaminophen (NORCO) 5-325 MG tablet  HYDROcodone-acetaminophen (NORCO) 5-325 MG tablet  metroNIDAZOLE (FLAGYL) 500 MG tablet          Review of Systems   Constitutional: Negative.    HENT: Negative.     Eyes: Negative.    Respiratory: Negative.     Cardiovascular: Negative.    Gastrointestinal:  Positive for abdominal pain.   Endocrine: Negative.    Genitourinary: Negative.    Musculoskeletal: Negative.    Skin: Negative.    Allergic/Immunologic: Negative.    Neurological: Negative.    Hematological: Negative.    Psychiatric/Behavioral: Negative.     All other systems reviewed and are negative.      Physical Exam   BP: 113/62  Pulse: 96  Temp: 97.6  F (36.4  C)  Resp: 18  SpO2: 97 %      Physical Exam  Constitutional:       General: She is not in acute distress.     Appearance: She is well-developed. She is not ill-appearing, toxic-appearing or diaphoretic.   HENT:      Head: Normocephalic and atraumatic.   Eyes:      Extraocular Movements: Extraocular movements intact.      Pupils: Pupils are equal, round, and reactive to light.   Cardiovascular:      Rate and Rhythm: Normal rate and regular rhythm.   Pulmonary:      Effort: Pulmonary effort is normal. No respiratory distress.      Breath sounds: Normal breath sounds. No stridor. No wheezing, rhonchi or rales.   Chest:      Chest wall: No tenderness.   Abdominal:      Palpations: Abdomen is soft.      Tenderness: There is abdominal tenderness  in the right lower quadrant and periumbilical area.       Skin:     Capillary Refill: Capillary refill takes less than 2 seconds.      Coloration: Skin is not cyanotic or mottled.      Findings: No erythema.   Neurological:      General: No focal deficit present.      Mental Status: She is alert and oriented to person, place, and time.   Psychiatric:         Mood and Affect: Mood normal. Mood is not anxious or depressed.         Behavior: Behavior normal.         ED Course        Procedures              Critical Care time:  none               ED medications:  Medications   lactated ringers infusion 1,000 mL (has no administration in time range)   HYDROmorphone (PF) (DILAUDID) injection 0.5 mg (has no administration in time range)   lactated ringers BOLUS 500 mL (0 mLs Intravenous Stopped 4/23/24 2310)   ketorolac (TORADOL) injection 10 mg (10 mg Intravenous $Given 4/23/24 2134)   iopamidol (ISOVUE-370) solution 100 mL (100 mLs Intravenous $Given 4/23/24 2242)   sodium chloride 0.9 % bag 500 mL for CT scan flush use (72 mLs Intravenous $Given 4/23/24 2242)   ondansetron (ZOFRAN) injection 4 mg (4 mg Intravenous Not Given 4/23/24 2137)     ED Vitals:  Vitals:    04/23/24 2110   BP: 113/62   Pulse: 96   Resp: 18   Temp: 97.6  F (36.4  C)   SpO2: 97%      ED labs and imaging:  Results for orders placed or performed during the hospital encounter of 04/23/24   CT Abdomen Pelvis w Contrast     Status: None (Preliminary result)    Narrative    EXAM: CT ABDOMEN PELVIS W CONTRAST  LOCATION: [facility]  DATE: 4/23/2024    INDICATION: Large BMI. One day hx of periumbilical and right lower quadrant abdominal pain. Evaluate for acute appendicitis vs other acute intra-abdominal process under catastrophe.   COMPARISON: 01/24/2024.  TECHNIQUE: CT scan of the abdomen and pelvis was performed following injection of IV contrast. Multiplanar reformats were obtained. Dose reduction techniques were  used.  CONTRAST: 100 mL Isovue-370.    FINDINGS:   LOWER CHEST: Normal.    HEPATOBILIARY: Normal.    PANCREAS: Normal.    SPLEEN: Normal.    ADRENAL GLANDS: Normal.    KIDNEYS/BLADDER: Normal.    BOWEL: Colonic diverticulosis. Localized low-density wall thickening and surrounding inflammatory stranding and edema in the descending colon in the left side of the abdomen compatible with acute diverticulitis. No evidence for abscess, free air, or   bowel obstruction. Normal appendix.    LYMPH NODES: Normal.    VASCULATURE: Normal.    PELVIC ORGANS: Normal.    MUSCULOSKELETAL: Normal.      Impression    IMPRESSION:   1.  Acute diverticulitis involving the descending colon in the left side of the abdomen. No evidence for abscess, free air, or bowel obstruction.    2.  No significant findings elsewhere.   Comprehensive metabolic panel     Status: Normal   Result Value Ref Range    Sodium 141 135 - 145 mmol/L    Potassium 3.5 3.4 - 5.3 mmol/L    Carbon Dioxide (CO2) 28 22 - 29 mmol/L    Anion Gap 9 7 - 15 mmol/L    Urea Nitrogen 10.4 6.0 - 20.0 mg/dL    Creatinine 0.82 0.51 - 0.95 mg/dL    GFR Estimate >90 >60 mL/min/1.73m2    Calcium 9.1 8.6 - 10.0 mg/dL    Chloride 104 98 - 107 mmol/L    Glucose 78 70 - 99 mg/dL    Alkaline Phosphatase 118 40 - 150 U/L    AST 17 0 - 45 U/L    ALT 13 0 - 50 U/L    Protein Total 7.6 6.4 - 8.3 g/dL    Albumin 3.8 3.5 - 5.2 g/dL    Bilirubin Total 0.2 <=1.2 mg/dL   [facility] [facility] Draw     Status: None    Narrative    The following orders were created for panel order [employer] [facility] Draw.  Procedure                               Abnormality         Status                     ---------                               -----------         ------                     Extra Blue Top Tube[[id number]]                              Final result               Extra Red Top Tube[[id number]]                               Final result                 Please view results for these tests on the individual orders.   CBC with  platelets and differential     Status: Abnormal   Result Value Ref Range    WBC Count 16.0 (H) 4.0 - 11.0 10e3/uL    RBC Count 4.99 3.80 - 5.20 10e6/uL    Hemoglobin 14.1 11.7 - 15.7 g/dL    Hematocrit 42.3 35.0 - 47.0 %    MCV 85 78 - 100 fL    MCH 28.3 26.5 - 33.0 pg    MCHC 33.3 31.5 - 36.5 g/dL    RDW 13.1 10.0 - 15.0 %    Platelet Count 348 150 - 450 10e3/uL    % Neutrophils 70 %    % Lymphocytes 21 %    % Monocytes 6 %    % Eosinophils 2 %    % Basophils 0 %    % Immature Granulocytes 0 %    NRBCs per 100 WBC 0 <1 /100    Absolute Neutrophils 11.2 (H) 1.6 - 8.3 10e3/uL    Absolute Lymphocytes 3.4 0.8 - 5.3 10e3/uL    Absolute Monocytes 0.9 0.0 - 1.3 10e3/uL    Absolute Eosinophils 0.3 0.0 - 0.7 10e3/uL    Absolute Basophils 0.1 0.0 - 0.2 10e3/uL    Absolute Immature Granulocytes 0.1 <=0.4 10e3/uL    Absolute NRBCs 0.0 10e3/uL   Extra Blue Top Tube     Status: None   Result Value Ref Range    Hold Specimen [facility]    Extra Red Top Tube     Status: None   Result Value Ref Range    Hold Specimen [facility]    UA with Microscopic reflex to Culture     Status: Abnormal    Specimen: Urine, Clean Catch   Result Value Ref Range    Color Urine Yellow Colorless, Straw, Light Yellow, Yellow    Appearance Urine Clear Clear    Glucose Urine Negative Negative mg/dL    Bilirubin Urine Negative Negative    Ketones Urine 5 (A) Negative mg/dL    Specific Gravity Urine 1.027 1.003 - 1.035    Blood Urine Negative Negative    pH Urine 5.0 5.0 - 7.0    Protein Albumin Urine Negative Negative mg/dL    Urobilinogen Urine 2.0 Normal, 2.0 mg/dL    Nitrite Urine Negative Negative    Leukocyte Esterase Urine Negative Negative    Mucus Urine Present (A) None Seen /LPF    Calcium Oxalate Crystals Urine Few (A) None Seen /HPF    RBC Urine 2 <=2 /HPF    WBC Urine <1 <=5 /HPF    Squamous Epithelials Urine 2 (H) <=1 /HPF    Narrative    Urine Culture not indicated   HCG qualitative urine     Status: Normal   Result Value Ref Range    hCG Urine  Qualitative Negative Negative   CBC with Platelets & Differential     Status: Abnormal    Narrative    The following orders were created for panel order CBC with Platelets & Differential.  Procedure                               Abnormality         Status                     ---------                               -----------         ------                     CBC with platelets and d...[[id number]]  Abnormal            Final result                 Please view results for these tests on the individual orders.     Assessments & Plan (with Medical Decision Making)   Assessment Summary and clinical Impression: 33-year-old female who presented with report of 24 hours of periumbilical and right lower quadrant abdominal pain.  She was concerned about her gallbladder and appendix and arrived by car from home for further assessment and care.  She was afebrile on arrival and hemodynamically normal.  Large BMI with periumbilical tenderness without rebound or guarding.  Given large BMI and location of pain abdominal imaging with contrast obtained to assess for acute appendicitis with other intra-abdominal process.  CT surprisingly revealed diverticulitis in the descending colon the left side.  This does not correspond with the predominant location of patient's discomfort although patient did report that her pain radiated from the right side to the left side and in the mid and suprapubic area.  We discussed her history of diverticulitis which reported this was the third episode in 3 years.  After reviewing care goals including supportive care and watchful waiting given acuity of presentation within 24 hours versus treatment with oral antibiotics she elected to begin oral antibiotics.  She was given Augmentin twice daily x 7 days. Previouslt treated with ciprofloxacin and Flagyl 3 months prior. Follow-up care with her care team reviewed.    ED course and plan:  Reviewed the medical record.  Reviewed visit in 3/21/24.  Last  abdominal imaging 1/24/24.  Offered medications to manage pain and discomfort.  Reviewed options for admission and workup given location of pain and patient's large BMI.  Elected to proceed with CT imaging giving competing diagnosis. Workup revealed white count of 16.  Normal hemogram.  Negative pregnancy test.  Urinalysis did reveal calcium oxalate, no hematuria.  Abdominal imaging revealed acute diverticulitis in the descending colon and the left side.  No abscess or free air.  Reviewed treatment goals with the patient including monitoring and supportive care with acuity of presentation within the last 24 hours vs oral antibiotics .  After reviewing risk and benefit and options for care patient elected to be treated since this is her third episode in 3 years.  She was treated with ciprofloxacin and Flagyl January 2024.  We elected to switch to Augmentin to facilitate compliance and monotherapy x 7 days.  We discussed follow-up care with care team including discussion about further intervention if necessary with recurrent episodes of diverticulitis.  Reviewed concerning symptoms including reasons to return for reevaluation.  She expressed understanding and comfort with the plan of care.      Disclaimer: This note consists of symbols derived from keyboarding, dictation and/or voice recognition software. As a result, there may be errors in the script that have gone undetected. Please consider this when interpreting information found in this chart.   I have reviewed the nursing notes.    I have reviewed the findings, diagnosis, plan and need for follow up with the patient.           Medical Decision Making  The patient's presentation was of high complexity (Large BMI, abdominal pain).    The patient's evaluation involved:  ordering and/or review of 2 test(s) in this encounter (diagnostic imaging and labs)    The patient's management necessitated moderate risk (prescription drug management including medications given  in the ED).        New Prescriptions    AMOXICILLIN-CLAVULANATE (AUGMENTIN) 875-125 MG TABLET    Take 1 tablet by mouth 2 times daily for 7 days       Final diagnoses:   Acute diverticulitis - CT revealing- Acute diverticulitis involving the descending colon in the left side of the abdomen       4/23/2024   [facility] EMERGENCY DEPT       Nii Garcia MD  04/23/24 6149

## 2024-04-24 NOTE — ED TRIAGE NOTES
Right sided Abdominal pain that radiates across the abdomen to the right side. Started about 4 hrs ago. Denies fevers, nausea, vomitting.      Triage Assessment (Adult)       Row Name 04/23/24 8897          Triage Assessment    Airway WDL WDL        Respiratory WDL    Respiratory WDL WDL        Skin Circulation/Temperature WDL    Skin Circulation/Temperature WDL WDL        Cardiac WDL    Cardiac WDL WDL        Peripheral/Neurovascular WDL    Peripheral Neurovascular WDL WDL        Cognitive/Neuro/Behavioral WDL    Cognitive/Neuro/Behavioral WDL WDL

## 2024-04-27 ENCOUNTER — APPOINTMENT (OUTPATIENT)
Dept: CT IMAGING | Facility: CLINIC | Age: 34
End: 2024-04-27
Attending: NURSE PRACTITIONER
Payer: COMMERCIAL

## 2024-04-27 ENCOUNTER — HOSPITAL ENCOUNTER (EMERGENCY)
Facility: CLINIC | Age: 34
Discharge: HOME | End: 2024-04-27
Attending: NURSE PRACTITIONER | Admitting: NURSE PRACTITIONER
Payer: COMMERCIAL

## 2024-04-27 VITALS
SYSTOLIC BLOOD PRESSURE: 136 MMHG | HEIGHT: 64 IN | DIASTOLIC BLOOD PRESSURE: 78 MMHG | BODY MASS INDEX: 45.75 KG/M2 | RESPIRATION RATE: 16 BRPM | TEMPERATURE: 97 F | WEIGHT: 268 LBS | HEART RATE: 92 BPM | OXYGEN SATURATION: 98 %

## 2024-04-27 DIAGNOSIS — K57.92 ACUTE DIVERTICULITIS: Primary | ICD-10-CM

## 2024-04-27 LAB
ALBUMIN SERPL BCG-MCNC: 3.9 G/DL (ref 3.5–5.2)
ALP SERPL-CCNC: 116 U/L (ref 40–150)
ALT SERPL W P-5'-P-CCNC: 22 U/L (ref 0–50)
ANION GAP SERPL CALCULATED.3IONS-SCNC: 10 MMOL/L (ref 7–15)
AST SERPL W P-5'-P-CCNC: 23 U/L (ref 0–45)
BASOPHILS # BLD AUTO: 0.1 10E3/UL (ref 0–0.2)
BASOPHILS NFR BLD AUTO: 0 %
BILIRUB SERPL-MCNC: 0.2 MG/DL
BUN SERPL-MCNC: 12.1 MG/DL (ref 6–20)
CALCIUM SERPL-MCNC: 9.6 MG/DL (ref 8.6–10)
CHLORIDE SERPL-SCNC: 103 MMOL/L (ref 98–107)
CREAT SERPL-MCNC: 0.78 MG/DL (ref 0.51–0.95)
DEPRECATED HCO3 PLAS-SCNC: 27 MMOL/L (ref 22–29)
EGFRCR SERPLBLD CKD-EPI 2021: >90 ML/MIN/1.73M2
EOSINOPHIL # BLD AUTO: 0.2 10E3/UL (ref 0–0.7)
EOSINOPHIL NFR BLD AUTO: 1 %
ERYTHROCYTE [DISTWIDTH] IN BLOOD BY AUTOMATED COUNT: 12.9 % (ref 10–15)
GLUCOSE SERPL-MCNC: 99 MG/DL (ref 70–99)
HCT VFR BLD AUTO: 43.9 % (ref 35–47)
HGB BLD-MCNC: 14.7 G/DL (ref 11.7–15.7)
IMM GRANULOCYTES # BLD: 0.1 10E3/UL
IMM GRANULOCYTES NFR BLD: 0 %
LYMPHOCYTES # BLD AUTO: 2.7 10E3/UL (ref 0.8–5.3)
LYMPHOCYTES NFR BLD AUTO: 18 %
MCH RBC QN AUTO: 28.4 PG (ref 26.5–33)
MCHC RBC AUTO-ENTMCNC: 33.5 G/DL (ref 31.5–36.5)
MCV RBC AUTO: 85 FL (ref 78–100)
MONOCYTES # BLD AUTO: 0.6 10E3/UL (ref 0–1.3)
MONOCYTES NFR BLD AUTO: 4 %
NEUTROPHILS # BLD AUTO: 11.1 10E3/UL (ref 1.6–8.3)
NEUTROPHILS NFR BLD AUTO: 75 %
NRBC # BLD AUTO: 0 10E3/UL
NRBC BLD AUTO-RTO: 0 /100
PLATELET # BLD AUTO: 359 10E3/UL (ref 150–450)
POTASSIUM SERPL-SCNC: 4.5 MMOL/L (ref 3.4–5.3)
PROT SERPL-MCNC: 7.6 G/DL (ref 6.4–8.3)
RBC # BLD AUTO: 5.18 10E6/UL (ref 3.8–5.2)
SODIUM SERPL-SCNC: 140 MMOL/L (ref 135–145)
WBC # BLD AUTO: 14.7 10E3/UL (ref 4–11)

## 2024-04-27 PROCEDURE — 36415 COLL VENOUS BLD VENIPUNCTURE: CPT | Performed by: NURSE PRACTITIONER

## 2024-04-27 PROCEDURE — 99284 EMERGENCY DEPT VISIT MOD MDM: CPT | Performed by: NURSE PRACTITIONER

## 2024-04-27 PROCEDURE — 74177 CT ABD & PELVIS W/CONTRAST: CPT

## 2024-04-27 PROCEDURE — 85025 COMPLETE CBC W/AUTO DIFF WBC: CPT | Performed by: NURSE PRACTITIONER

## 2024-04-27 PROCEDURE — 250N000009 HC RX 250: Performed by: NURSE PRACTITIONER

## 2024-04-27 PROCEDURE — 99285 EMERGENCY DEPT VISIT HI MDM: CPT | Mod: 25

## 2024-04-27 PROCEDURE — 250N000011 HC RX IP 250 OP 636: Performed by: NURSE PRACTITIONER

## 2024-04-27 PROCEDURE — 80053 COMPREHEN METABOLIC PANEL: CPT | Performed by: NURSE PRACTITIONER

## 2024-04-27 RX ORDER — METRONIDAZOLE 500 MG/1
500 TABLET ORAL 2 TIMES DAILY
Qty: 14 TABLET | Refills: 0 | Status: SHIPPED | OUTPATIENT
Start: 2024-04-27 | End: 2024-04-27

## 2024-04-27 RX ORDER — METRONIDAZOLE 500 MG/1
500 TABLET ORAL 3 TIMES DAILY
Qty: 14 TABLET | Refills: 0 | Status: SHIPPED | OUTPATIENT
Start: 2024-04-27

## 2024-04-27 RX ORDER — IOPAMIDOL 755 MG/ML
100 INJECTION, SOLUTION INTRAVASCULAR ONCE
Status: COMPLETED | OUTPATIENT
Start: 2024-04-27 | End: 2024-04-27

## 2024-04-27 RX ORDER — CIPROFLOXACIN 500 MG/1
500 TABLET, FILM COATED ORAL 2 TIMES DAILY
Qty: 14 TABLET | Refills: 0 | Status: SHIPPED | OUTPATIENT
Start: 2024-04-27 | End: 2024-05-04

## 2024-04-27 RX ADMIN — SODIUM CHLORIDE 73 ML: 9 INJECTION, SOLUTION INTRAVENOUS at 11:59

## 2024-04-27 RX ADMIN — IOPAMIDOL 100 ML: 755 INJECTION, SOLUTION INTRAVENOUS at 11:59

## 2024-04-27 ASSESSMENT — ACTIVITIES OF DAILY LIVING (ADL)
ADLS_ACUITY_SCORE: 35
ADLS_ACUITY_SCORE: 33
ADLS_ACUITY_SCORE: 35

## 2024-04-27 NOTE — ED TRIAGE NOTES
Pt presents with mid to left lower abdominal pain. Diagnosed with diverticulitis. Has been on abx for 4 days and symptoms have stayed the same. Pt states she had this before and was prescribed a combination of two abx that relieved symptoms within 2 days.      Triage Assessment (Adult)       Row Name 04/27/24 1022          Triage Assessment    Airway WDL WDL        Respiratory WDL    Respiratory WDL WDL        Skin Circulation/Temperature WDL    Skin Circulation/Temperature WDL WDL        Cardiac WDL    Cardiac WDL WDL        Peripheral/Neurovascular WDL    Peripheral Neurovascular WDL WDL        Cognitive/Neuro/Behavioral WDL    Cognitive/Neuro/Behavioral WDL WDL

## 2024-04-27 NOTE — DISCHARGE INSTRUCTIONS
Recommend stopping Augmentin antibiotics.  Replacing antibiotics with ciprofloxacin and Flagyl, recommend increase oral fluid intake manage fevers and pain with ibuprofen or Tylenol, if symptoms or not resolved within 7 days please follow-up in primary care or return here if symptoms worsen despite recommended treatment plan please return to the emergency department for further evaluation.  General surgery consult has been ordered for you as well due to the amount of episodes of acute diverticulitis that you have had.  Recommend follow-up discussion with general surgery and/or GI providers.  Please feel free to contact your primary care clinic or contact us if you have any further questions thank you a voicemail was left on your phone number listed noting that I did place a consult.

## 2024-04-27 NOTE — ED PROVIDER NOTES
ED Provider Note  [facility]      History     Chief Complaint   Patient presents with    Abdominal Pain     HPI  Huong Meredith is a 33 year old female who presents with abdominal pain that changed from her right side to worsening on her left side, was seen on 4/23/2024 diagnosed with diverticulitis was put on Augmentin twice daily she took her seventh dose of antibiotics this morning.  Reports that pain has not resolved and is worse presenting currently on her left side.  Denies any nausea, vomiting reports having a mucus blood-tinged stool this morning.  Denies any pain with urination, hematuria, or foul-smelling urine or flank pain bilateral. Denies any possibility of pregnancy, last LMP 4/16/2024, pregnancy testing was done 4/23/2024.  Imaging on 4/23/2024 showed left-sided diverticulitis of the descending colon, however pain was presenting on the right side when she was seen on 4/23/2024.  Reports that pain has now moved to her left side and extends into her right side.  Denies any onset of nausea or vomiting while on Augmentin which was ordered for treatment twice daily on 4/23/2024.      Allergies:  Allergies   Allergen Reactions    Miconazole     Adhesive Tape Itching and Rash    Blood-Group Specific Substance Unknown     Patient has Probable Passive Anti-D.  Blood Product orders may be delayed.  Draw one red top and two purple top tubes for ALL Type and Screen/ Type and Crossmatch orders.    Buspirone Rash    Calamine Rash    Nickel Chloride  [Nickel] Rash       Problem List:    Patient Active Problem List    Diagnosis Date Noted    Acute bilateral low back pain with bilateral sciatica 04/01/2024     Priority: Medium    Encounter for triage in pregnant patient 07/09/2023     Priority: Medium    Polyp of colon 05/25/2022     Priority: Medium    Depressive disorder 04/02/2021     Priority: Medium    Seasonal allergies 07/01/2019     Priority: Medium    Anxiety 06/13/2018      "Priority: Medium    Diverticulosis of large intestine 06/13/2018     Priority: Medium    Hemorrhoids, internal 06/13/2018     Priority: Medium    History of colonic polyps 06/13/2018     Priority: Medium    Morbid obesity with BMI of 40.0-44.9, adult (H) 01/28/2016     Priority: Medium    Prior fetal anencephaly in third trimester, antepartum 01/28/2016     Priority: Medium        Past Medical History:    No past medical history on file.    Past Surgical History:    No past surgical history on file.    Family History:    No family history on file.    Social History:  Marital Status:  Single [1]  Social History     Tobacco Use    Smoking status: Never    Smokeless tobacco: Never        Medications:    amoxicillin-clavulanate (AUGMENTIN) 875-125 MG tablet  folic acid (FOLVITE) 1 MG tablet          Review of Systems  A medically appropriate review of systems was performed with pertinent positives and negatives noted in the HPI, and all other systems negative.    Physical Exam   Patient Vitals for the past 24 hrs:   BP Temp Temp src Pulse Resp SpO2 Height Weight   04/27/24 1020 126/87 97  F (36.1  C) Tympanic 115 16 97 % 1.626 m (5' 4\") 121.6 kg (268 lb)   04/27/24 1004 108/75 97.9  F (36.6  C) Tympanic 113 22 98 % -- --          Physical Exam  General: alert and in mod acute distress on arrival  Head: atraumatic, normocephalic  Lungs:  nonlabored, CTA bilateral throughout.  CV: Regular rate and rhythm, extremities warm and perfused  Abd: nondistended, pain with palpation of right and left side lower abdomen, bowel sounds present all quadrants.  With palpation over left lower quadrant, pain is reported as cramping and sharp and worsening into left lower abdominal quadrant. No rebound tenderness present no CVA tenderness bilateral.  Skin: no rashes, no diaphoresis and skin color normal  Neuro: Patient awake, alert, speech is fluent, appropriate historian.  No focal deficits identified.  Psychiatric: affect/mood normal, " pleasant      ED Course                 Procedures               Results for orders placed or performed during the hospital encounter of 04/27/24 (from the past 24 hour(s))   CBC with platelets, differential    Narrative    The following orders were created for panel order CBC with platelets, differential.  Procedure                               Abnormality         Status                     ---------                               -----------         ------                     CBC with platelets and d...[[id number]]  Abnormal            Final result                 Please view results for these tests on the individual orders.   Comprehensive metabolic panel   Result Value Ref Range    Sodium 140 135 - 145 mmol/L    Potassium 4.5 3.4 - 5.3 mmol/L    Carbon Dioxide (CO2) 27 22 - 29 mmol/L    Anion Gap 10 7 - 15 mmol/L    Urea Nitrogen 12.1 6.0 - 20.0 mg/dL    Creatinine 0.78 0.51 - 0.95 mg/dL    GFR Estimate >90 >60 mL/min/1.73m2    Calcium 9.6 8.6 - 10.0 mg/dL    Chloride 103 98 - 107 mmol/L    Glucose 99 70 - 99 mg/dL    Alkaline Phosphatase 116 40 - 150 U/L    AST 23 0 - 45 U/L    ALT 22 0 - 50 U/L    Protein Total 7.6 6.4 - 8.3 g/dL    Albumin 3.9 3.5 - 5.2 g/dL    Bilirubin Total 0.2 <=1.2 mg/dL   CBC with platelets and differential   Result Value Ref Range    WBC Count 14.7 (H) 4.0 - 11.0 10e3/uL    RBC Count 5.18 3.80 - 5.20 10e6/uL    Hemoglobin 14.7 11.7 - 15.7 g/dL    Hematocrit 43.9 35.0 - 47.0 %    MCV 85 78 - 100 fL    MCH 28.4 26.5 - 33.0 pg    MCHC 33.5 31.5 - 36.5 g/dL    RDW 12.9 10.0 - 15.0 %    Platelet Count 359 150 - 450 10e3/uL    % Neutrophils 75 %    % Lymphocytes 18 %    % Monocytes 4 %    % Eosinophils 1 %    % Basophils 0 %    % Immature Granulocytes 0 %    NRBCs per 100 WBC 0 <1 /100    Absolute Neutrophils 11.1 (H) 1.6 - 8.3 10e3/uL    Absolute Lymphocytes 2.7 0.8 - 5.3 10e3/uL    Absolute Monocytes 0.6 0.0 - 1.3 10e3/uL    Absolute Eosinophils 0.2 0.0 - 0.7 10e3/uL    Absolute Basophils  0.1 0.0 - 0.2 10e3/uL    Absolute Immature Granulocytes 0.1 <=0.4 10e3/uL    Absolute NRBCs 0.0 10e3/uL   CT Abdomen Pelvis w Contrast    Narrative    EXAM: CT ABDOMEN PELVIS W CONTRAST  LOCATION: [facility]  DATE: 4/27/2024    INDICATION: lower abdominal pain worse on left recently diagnosed with Diverticulitis  COMPARISON: CT abdomen/pelvis 04/23/2024.  TECHNIQUE: CT scan of the abdomen and pelvis was performed following injection of IV contrast. Multiplanar reformats were obtained. Dose reduction techniques were used.  CONTRAST: 100mL Isovue 370    FINDINGS:   LOWER CHEST: Minimal right basilar atelectasis and/or scarring.    HEPATOBILIARY: Normal.    PANCREAS: Normal.    SPLEEN: Normal.    ADRENAL GLANDS: Normal.    KIDNEYS/BLADDER: Normal.    BOWEL: No bowel obstruction. Colonic diverticulosis. Increased mural thickening of the distal descending colon and slightly increased pericolonic stranding. No drainable pericolonic collection or pneumoperitoneum. Normal appendix. No ascites.    LYMPH NODES: Normal.    VASCULATURE: Normal caliber abdominal aorta without significant atherosclerotic plaque.    PELVIC ORGANS: Normal.    MUSCULOSKELETAL: No destructive osseous lesion.      Impression    IMPRESSION:   1.  Redemonstrated findings of acute uncomplicated diverticulitis of the distal descending colon with slightly increased inflammatory changes.       MEDICATIONS GIVEN IN THE EMERGENCY DEPARTMENT:  Medications - No data to display             Assessments & Plan (with Medical Decision Making)  Patient was seen and Augmentin twice daily was ordered for diverticulitis on 04/23/2024.  Patient started day 4 of antibiotics today without improvement of pain.  Pain is now reported on the left lower quadrant, reports having a mucus blood tinged stool this morning.     33 year old female who presents to the emergency department for evaluation of pain which is reported as not improving with current  diagnosis of acute diverticulitis.  CT with contrast was ordered findings: Redemonstrating acute diverticulitis of the distal descending colon.  Antibiotics changed to Cipro 500 mg twice daily and metronidazole 500 mg 3 times daily for 7 days.  Patient advised to return if symptoms worsen despite recommended treatment changes, a general surgery consult was ordered and they will contact patient to schedule as this is patient's fourth episode of diverticulitis encounters in the last 1.5 years.  Patient advised to return if symptoms worsen despite plan or to contact us with further questions that she has or her primary care office.       I have reviewed the nursing notes.    I have reviewed the findings, diagnosis, plan and need for follow up with the patient.        NEW PRESCRIPTIONS STARTED AT TODAY'S ER VISIT  ciprofloxacin (CIPRO) 500 MG tablet  Take 1 tablet (500 mg) by mouth 2 times daily for 7 days, Disp-14 tablet, R-0, E-Prescribe, Pharmacy: [facility], [place] - [address], Refills: 0 ordered, Ordered by Sho Renee APRN CNP on 4/27/2024 at 1301  Changed  metroNIDAZOLE (FLAGYL) 500 MG tablet  Take 1 tablet (500 mg) by mouth 3 times daily, Disp-14 tablet, R-0, E-Prescribe, Pharmacy: [facility], [place] - [address] [place] [address], Refills: 0 ordered, Ordered by Sho Renee APRN CNP on 4/27/2024 at 1305      Final diagnoses:   Acute diverticulitis       4/27/2024   [facility] EMERGENCY DEPT       Sho Renee APRN CNP  04/27/24 7561

## 2024-08-31 ENCOUNTER — HEALTH MAINTENANCE LETTER (OUTPATIENT)
Age: 34
End: 2024-08-31

## 2024-10-09 NOTE — PROGRESS NOTES
DISCHARGE  Reason for Discharge: Patient has failed to schedule further appointments.    Equipment Issued: none    Discharge Plan: Patient to continue home program.    Referring Provider:  Jung Estrella

## 2024-10-21 ENCOUNTER — APPOINTMENT (OUTPATIENT)
Dept: CT IMAGING | Facility: CLINIC | Age: 34
End: 2024-10-21
Attending: EMERGENCY MEDICINE
Payer: COMMERCIAL

## 2024-10-21 ENCOUNTER — HOSPITAL ENCOUNTER (EMERGENCY)
Facility: CLINIC | Age: 34
Discharge: HOME | End: 2024-10-21
Attending: EMERGENCY MEDICINE | Admitting: EMERGENCY MEDICINE
Payer: COMMERCIAL

## 2024-10-21 VITALS
HEART RATE: 79 BPM | SYSTOLIC BLOOD PRESSURE: 101 MMHG | RESPIRATION RATE: 16 BRPM | WEIGHT: 264 LBS | BODY MASS INDEX: 45.07 KG/M2 | DIASTOLIC BLOOD PRESSURE: 71 MMHG | OXYGEN SATURATION: 99 % | TEMPERATURE: 97.3 F | HEIGHT: 64 IN

## 2024-10-21 DIAGNOSIS — K57.32 SIGMOID DIVERTICULITIS: ICD-10-CM

## 2024-10-21 DIAGNOSIS — R10.32 LLQ ABDOMINAL PAIN: ICD-10-CM

## 2024-10-21 LAB
ALBUMIN SERPL BCG-MCNC: 3.9 G/DL (ref 3.5–5.2)
ALP SERPL-CCNC: 116 U/L (ref 40–150)
ALT SERPL W P-5'-P-CCNC: 21 U/L (ref 0–50)
ANION GAP SERPL CALCULATED.3IONS-SCNC: 12 MMOL/L (ref 7–15)
AST SERPL W P-5'-P-CCNC: 20 U/L (ref 0–45)
BASOPHILS # BLD AUTO: 0 10E3/UL (ref 0–0.2)
BASOPHILS NFR BLD AUTO: 0 %
BILIRUB SERPL-MCNC: 0.2 MG/DL
BUN SERPL-MCNC: 11.6 MG/DL (ref 6–20)
CALCIUM SERPL-MCNC: 9.1 MG/DL (ref 8.8–10.4)
CHLORIDE SERPL-SCNC: 102 MMOL/L (ref 98–107)
CREAT SERPL-MCNC: 0.79 MG/DL (ref 0.51–0.95)
EGFRCR SERPLBLD CKD-EPI 2021: >90 ML/MIN/1.73M2
EOSINOPHIL # BLD AUTO: 0.2 10E3/UL (ref 0–0.7)
EOSINOPHIL NFR BLD AUTO: 1 %
ERYTHROCYTE [DISTWIDTH] IN BLOOD BY AUTOMATED COUNT: 12.5 % (ref 10–15)
GLUCOSE SERPL-MCNC: 101 MG/DL (ref 70–99)
HCG SERPL QL: NEGATIVE
HCO3 SERPL-SCNC: 25 MMOL/L (ref 22–29)
HCT VFR BLD AUTO: 40.6 % (ref 35–47)
HGB BLD-MCNC: 13.9 G/DL (ref 11.7–15.7)
IMM GRANULOCYTES # BLD: 0 10E3/UL
IMM GRANULOCYTES NFR BLD: 0 %
LIPASE SERPL-CCNC: 16 U/L (ref 13–60)
LYMPHOCYTES # BLD AUTO: 2.9 10E3/UL (ref 0.8–5.3)
LYMPHOCYTES NFR BLD AUTO: 20 %
MCH RBC QN AUTO: 29.3 PG (ref 26.5–33)
MCHC RBC AUTO-ENTMCNC: 34.2 G/DL (ref 31.5–36.5)
MCV RBC AUTO: 86 FL (ref 78–100)
MONOCYTES # BLD AUTO: 0.7 10E3/UL (ref 0–1.3)
MONOCYTES NFR BLD AUTO: 5 %
NEUTROPHILS # BLD AUTO: 11.2 10E3/UL (ref 1.6–8.3)
NEUTROPHILS NFR BLD AUTO: 74 %
NRBC # BLD AUTO: 0 10E3/UL
NRBC BLD AUTO-RTO: 0 /100
PLATELET # BLD AUTO: 309 10E3/UL (ref 150–450)
POTASSIUM SERPL-SCNC: 3.6 MMOL/L (ref 3.4–5.3)
PROT SERPL-MCNC: 7.6 G/DL (ref 6.4–8.3)
RBC # BLD AUTO: 4.74 10E6/UL (ref 3.8–5.2)
SODIUM SERPL-SCNC: 139 MMOL/L (ref 135–145)
WBC # BLD AUTO: 15.1 10E3/UL (ref 4–11)

## 2024-10-21 PROCEDURE — 83690 ASSAY OF LIPASE: CPT | Performed by: EMERGENCY MEDICINE

## 2024-10-21 PROCEDURE — 85025 COMPLETE CBC W/AUTO DIFF WBC: CPT | Performed by: EMERGENCY MEDICINE

## 2024-10-21 PROCEDURE — 250N000011 HC RX IP 250 OP 636: Performed by: EMERGENCY MEDICINE

## 2024-10-21 PROCEDURE — 99285 EMERGENCY DEPT VISIT HI MDM: CPT | Mod: 25 | Performed by: EMERGENCY MEDICINE

## 2024-10-21 PROCEDURE — 84703 CHORIONIC GONADOTROPIN ASSAY: CPT | Performed by: EMERGENCY MEDICINE

## 2024-10-21 PROCEDURE — 74177 CT ABD & PELVIS W/CONTRAST: CPT

## 2024-10-21 PROCEDURE — 250N000009 HC RX 250: Performed by: EMERGENCY MEDICINE

## 2024-10-21 PROCEDURE — 36415 COLL VENOUS BLD VENIPUNCTURE: CPT | Performed by: EMERGENCY MEDICINE

## 2024-10-21 PROCEDURE — 80053 COMPREHEN METABOLIC PANEL: CPT | Performed by: EMERGENCY MEDICINE

## 2024-10-21 PROCEDURE — 99284 EMERGENCY DEPT VISIT MOD MDM: CPT | Performed by: EMERGENCY MEDICINE

## 2024-10-21 RX ORDER — METRONIDAZOLE 500 MG/1
500 TABLET ORAL 3 TIMES DAILY
Qty: 30 TABLET | Refills: 0 | Status: SHIPPED | OUTPATIENT
Start: 2024-10-21 | End: 2024-10-28

## 2024-10-21 RX ORDER — IOPAMIDOL 755 MG/ML
129 INJECTION, SOLUTION INTRAVASCULAR ONCE
Status: COMPLETED | OUTPATIENT
Start: 2024-10-21 | End: 2024-10-21

## 2024-10-21 RX ORDER — CIPROFLOXACIN 500 MG/1
500 TABLET, FILM COATED ORAL 2 TIMES DAILY
Qty: 14 TABLET | Refills: 0 | Status: SHIPPED | OUTPATIENT
Start: 2024-10-21

## 2024-10-21 RX ORDER — OXYCODONE HYDROCHLORIDE 5 MG/1
5 TABLET ORAL EVERY 6 HOURS PRN
Qty: 6 TABLET | Refills: 0 | Status: SHIPPED | OUTPATIENT
Start: 2024-10-21

## 2024-10-21 RX ADMIN — SODIUM CHLORIDE 72 ML: 9 INJECTION, SOLUTION INTRAVENOUS at 21:14

## 2024-10-21 RX ADMIN — IOPAMIDOL 129 ML: 755 INJECTION, SOLUTION INTRAVENOUS at 21:14

## 2024-10-21 ASSESSMENT — ACTIVITIES OF DAILY LIVING (ADL)
ADLS_ACUITY_SCORE: 35

## 2024-10-22 NOTE — DISCHARGE INSTRUCTIONS
You were diagnosed with diverticulitis.  This will be treated with 2 different antibiotics that you can start taking tonight.  Be aware that the ciprofloxacin can increase your risk of tendon rupture as we discussed, and you should avoid any increased activity while on treatment.  Initiate a liquid diet until your symptoms improve.  Use Tylenol and ibuprofen for pain and use the oxycodone for severe pain.  Follow-up with your regular when you are able.  Return to the ER if you develop a fever, severe pain or any other new or concerning symptoms peer

## 2024-10-22 NOTE — ED PROVIDER NOTES
History     Chief Complaint   Patient presents with    Abdominal Pain     Patient presents with complaints of LLQ abd pain radiating into the back that started today. Patient did have nausea/vomiting/diarrhea on Sat and Sun that has stopped.      HPI  Huong Meredith is a 34 year old female past medical history significant for previous diverticulitis who presents emergency department complaining of left lower quadrant abdominal pain.  Patient states pain started from the mid to lower abdomen on the left today.  She had been having some nausea vomiting and diarrhea on Saturday and Sunday but was able to eat last night and taking some things this morning without any nausea vomiting or diarrhea has not had a bowel movement since Sunday morning.  Had not noticed any blood in the urine denies blood in her stool has not had a fever or chills is not nauseated at the moment denies any leg swelling calf pain bowel or bladder dysfunction.    Allergies:  Allergies   Allergen Reactions    Miconazole     Adhesive Tape Itching and Rash    Blood-Group Specific Substance Unknown     Patient has Probable Passive Anti-D.  Blood Product orders may be delayed.  Draw one red top and two purple top tubes for ALL Type and Screen/ Type and Crossmatch orders.    Buspirone Rash    Calamine Rash    Nickel Chloride  [Nickel] Rash       Problem List:    Patient Active Problem List    Diagnosis Date Noted    Acute bilateral low back pain with bilateral sciatica 04/01/2024     Priority: Medium    Encounter for triage in pregnant patient 07/09/2023     Priority: Medium    Polyp of colon 05/25/2022     Priority: Medium    Depressive disorder 04/02/2021     Priority: Medium    Seasonal allergies 07/01/2019     Priority: Medium    Anxiety 06/13/2018     Priority: Medium    Diverticulosis of large intestine 06/13/2018     Priority: Medium    Hemorrhoids, internal 06/13/2018     Priority: Medium    History of colonic polyps 06/13/2018     Priority:  "Medium    Morbid obesity with BMI of 40.0-44.9, adult (H) 01/28/2016     Priority: Medium    Prior fetal anencephaly in third trimester, antepartum 01/28/2016     Priority: Medium        Past Medical History:    No past medical history on file.    Past Surgical History:    No past surgical history on file.    Family History:    No family history on file.    Social History:  Marital Status:  Single [1]  Social History     Tobacco Use    Smoking status: Never    Smokeless tobacco: Never        Medications:    folic acid (FOLVITE) 1 MG tablet  metroNIDAZOLE (FLAGYL) 500 MG tablet          Review of Systems  As per HPI  Physical Exam   BP: 101/71  Pulse: 79  Temp: 97.3  F (36.3  C)  Resp: 16  Height: 162.6 cm (5' 4\")  Weight: 119.7 kg (264 lb)  SpO2: 99 %      Physical Exam  Vitals and nursing note reviewed.   Constitutional:       General: She is not in acute distress.     Appearance: She is well-developed. She is not ill-appearing, toxic-appearing or diaphoretic.   HENT:      Head: Normocephalic and atraumatic.      Nose: Nose normal.      Mouth/Throat:      Mouth: Mucous membranes are moist.      Pharynx: Oropharynx is clear.   Cardiovascular:      Rate and Rhythm: Normal rate and regular rhythm.      Pulses: Normal pulses.      Heart sounds: Normal heart sounds. No murmur heard.  Pulmonary:      Effort: Pulmonary effort is normal.      Breath sounds: Normal breath sounds. No stridor. No wheezing or rhonchi.   Abdominal:      Comments: Soft, nondistended, tender to palpation of the left lower and mid quadrants.  There is no guarding or rebound present no masses or hernia present there is no flank pain present bowel sounds positive.   Musculoskeletal:         General: No swelling or tenderness. Normal range of motion.      Cervical back: Normal range of motion.      Right lower leg: No edema.      Left lower leg: No edema.   Skin:     General: Skin is warm and dry.      Capillary Refill: Capillary refill takes less " than 2 seconds.      Findings: No rash.   Neurological:      General: No focal deficit present.      Mental Status: She is alert and oriented to person, place, and time.      Sensory: No sensory deficit.      Motor: No weakness.      Coordination: Coordination normal.   Psychiatric:         Mood and Affect: Mood normal.         ED Course        Procedures              Critical Care time:  none              Labs Ordered and Resulted from Time of ED Arrival to Time of ED Departure   COMPREHENSIVE METABOLIC PANEL - Abnormal       Result Value    Sodium 139      Potassium 3.6      Carbon Dioxide (CO2) 25      Anion Gap 12      Urea Nitrogen 11.6      Creatinine 0.79      GFR Estimate >90      Calcium 9.1      Chloride 102      Glucose 101 (*)     Alkaline Phosphatase 116      AST 20      ALT 21      Protein Total 7.6      Albumin 3.9      Bilirubin Total 0.2     CBC WITH PLATELETS AND DIFFERENTIAL - Abnormal    WBC Count 15.1 (*)     RBC Count 4.74      Hemoglobin 13.9      Hematocrit 40.6      MCV 86      MCH 29.3      MCHC 34.2      RDW 12.5      Platelet Count 309      % Neutrophils 74      % Lymphocytes 20      % Monocytes 5      % Eosinophils 1      % Basophils 0      % Immature Granulocytes 0      NRBCs per 100 WBC 0      Absolute Neutrophils 11.2 (*)     Absolute Lymphocytes 2.9      Absolute Monocytes 0.7      Absolute Eosinophils 0.2      Absolute Basophils 0.0      Absolute Immature Granulocytes 0.0      Absolute NRBCs 0.0     LIPASE - Normal    Lipase 16     HCG QUALITATIVE PREGNANCY - Normal    hCG Serum Qualitative Negative        Results for orders placed or performed during the hospital encounter of 10/21/24   CT Abdomen Pelvis w Contrast    Narrative    EXAM: CT ABDOMEN AND PELVIS WITH CONTRAST  LOCATION: [facility]  DATE/TIME: 10/21/2024 9:39 PM CDT    INDICATION: Left lower quadrant pain.  COMPARISON: 4/27/2024.    TECHNIQUE: CT scan of the abdomen and pelvis was performed  following injection of IV contrast. Multiplanar reformats were obtained. Dose reduction techniques were used.  CONTRAST: 129 mL Isovue 370.    FINDINGS:    LOWER CHEST: Unremarkable.    HEPATOBILIARY: Unremarkable.    SPLEEN: Unremarkable.    PANCREAS: Unremarkable.    ADRENAL GLANDS: Unremarkable.    KIDNEYS/BLADDER: Unremarkable.    BOWEL: A few colonic diverticula are present. Haziness is present in the fat about a diverticulum in the anterior aspect of the proximal sigmoid colon (series 3 image 167). This appearance is consistent with diverticulitis. No extraluminal gas or   loculated fluid collections in the pelvis. The appendix is normal.    LYMPH NODES: Unremarkable.    PELVIC ORGANS: No acute findings.    MUSCULOSKELETAL: No acute findings.    OTHER: None.      Impression    IMPRESSION: Diverticulitis of the proximal sigmoid colon. No abscess.         Medications - No data to display    Assessments & Plan (with Medical Decision Making) records reviewed including past medical history medications and allergies.  On 4/23 and 4/27/2024 reviewed for diverticulitis.  Labs were obtained.  I independently reviewed and interpreted labs.  CBC significant for a white count of 15.1 with a left shift.  Comprehensive metabolic panel was unremarkable pregnancy test negative lipase was 16.  Due to patient's history and presentation a CT scan of the abdomen pelvis was obtained.  Patient will be signed out to Dr. Estrella for follow-up of imaging studies.     I have reviewed the nursing notes.    I have reviewed the findings, diagnosis, plan and need for follow up with the patient.             New Prescriptions    No medications on file       Final diagnoses:   LLQ abdominal pain   Sigmoid diverticulitis       10/21/2024   [facility] EMERGENCY DEPT       Geovani Lott MD  10/23/24 0861

## 2024-10-22 NOTE — ED PROVIDER NOTES
Brief progress note    Patient signed out by Dr. Lott pending CT scan.  Patient has a history of diverticulitis, has had it 3 times in the past, most recently in April.  Presents today with concern for diverticulitis and left lower quadrant pain.  Pain is well-controlled at this time.  CT scan was independently reviewed by me and radiology report reviewed and patient has evidence of uncomplicated sigmoid diverticulitis.  When I discussed this with the patient, she reported feeling well.  A trial of outpatient management is appropriate.  She is tolerating p.o.  She has been treated in the past with Cipro Flagyl.  She states Augmentin did not work.  Will prescribe Cipro Flagyl.  We described the risks of Cipro and the patient understands this.  I have also given her prescription for oxycodone and recommended a liquid diet.  I recommended she follow-up with her regular doctor to discuss if she needs another colonoscopy.  I offered to refer her to general surgery since this would be at least her fourth case of diverticulitis, but she wanted to hold off and talk to her regular doctor about this.  Return precautions were discussed.    MD Delores Lee Sean, MD  10/21/24 2406

## 2024-10-22 NOTE — ED TRIAGE NOTES
Patient presents with complaints of LLQ abd pain radiating into the back that started today. Patient did have nausea/vomiting/diarrhea on Sat and Sun that has stopped.      Triage Assessment (Adult)       Row Name 10/21/24 1914          Triage Assessment    Airway WDL WDL        Respiratory WDL    Respiratory WDL WDL        Skin Circulation/Temperature WDL    Skin Circulation/Temperature WDL WDL        Cardiac WDL    Cardiac WDL WDL        Peripheral/Neurovascular WDL    Peripheral Neurovascular WDL WDL        Cognitive/Neuro/Behavioral WDL    Cognitive/Neuro/Behavioral WDL WDL

## 2025-01-15 ENCOUNTER — HOSPITAL ENCOUNTER (EMERGENCY)
Facility: CLINIC | Age: 35
Discharge: HOME | End: 2025-01-16
Attending: FAMILY MEDICINE
Payer: COMMERCIAL

## 2025-01-15 DIAGNOSIS — R59.0 CERVICAL LYMPHADENOPATHY: ICD-10-CM

## 2025-01-15 DIAGNOSIS — H65.91 MEE (MIDDLE EAR EFFUSION), RIGHT: ICD-10-CM

## 2025-01-15 DIAGNOSIS — K14.8 TONGUE LESION: ICD-10-CM

## 2025-01-15 LAB — S PYO DNA THROAT QL NAA+PROBE: NOT DETECTED

## 2025-01-15 PROCEDURE — 87637 SARSCOV2&INF A&B&RSV AMP PRB: CPT | Performed by: FAMILY MEDICINE

## 2025-01-15 PROCEDURE — 99283 EMERGENCY DEPT VISIT LOW MDM: CPT | Performed by: FAMILY MEDICINE

## 2025-01-15 PROCEDURE — 99283 EMERGENCY DEPT VISIT LOW MDM: CPT

## 2025-01-15 PROCEDURE — 87651 STREP A DNA AMP PROBE: CPT | Performed by: FAMILY MEDICINE

## 2025-01-16 VITALS
SYSTOLIC BLOOD PRESSURE: 116 MMHG | RESPIRATION RATE: 18 BRPM | BODY MASS INDEX: 44.73 KG/M2 | WEIGHT: 262 LBS | DIASTOLIC BLOOD PRESSURE: 80 MMHG | OXYGEN SATURATION: 99 % | HEART RATE: 109 BPM | TEMPERATURE: 98.1 F | HEIGHT: 64 IN

## 2025-01-16 LAB
FLUAV RNA SPEC QL NAA+PROBE: NEGATIVE
FLUBV RNA RESP QL NAA+PROBE: NEGATIVE
RSV RNA SPEC NAA+PROBE: NEGATIVE
SARS-COV-2 RNA RESP QL NAA+PROBE: NEGATIVE

## 2025-01-16 NOTE — ED PROVIDER NOTES
History     Chief Complaint   Patient presents with    Pharyngitis    Otalgia   Sore throat  HPI  Huong Meredith is a 34 year old female, past medical history is significant for depression, seasonal allergies, anxiety, diverticulosis, internal hemorrhoids, morbid obesity, presents to the emergency department with concerns of sore throat.  History is obtained from the patient who presents with multiple concerns of variable chronicity the first concern that she has is a bump on her tongue that has been present for over a year that her dentist told her with scar tissue but nonetheless that she is worried about and has not had it evaluated by ENT as was recommended.  Her second concern is persistent pressure and fluid type concerns involving her ears that has been addressed by her primary beginning about April of last year.  She was told that this was fluid intermittently in the ears and she also noticed a lymph node around the angle of jaw left side which she was told was also related to this.  She has not seen ENT about it although that was apparently recommended.  This evening she presents also with concerns of sore throat for about the last 2 weeks and irritation or discomfort with swallowing.  The patient states that she has not yet seen ear nose and throat as planned and is not able to get into see them until early February (approximately 2 weeks from now)      Allergies:  Allergies   Allergen Reactions    Miconazole     Adhesive Tape Itching and Rash    Blood-Group Specific Substance Unknown     Patient has Probable Passive Anti-D.  Blood Product orders may be delayed.  Draw one red top and two purple top tubes for ALL Type and Screen/ Type and Crossmatch orders.    Buspirone Rash    Calamine Rash    Nickel Chloride  [Nickel] Rash       Problem List:    Patient Active Problem List    Diagnosis Date Noted    Acute bilateral low back pain with bilateral sciatica 04/01/2024     Priority: Medium    Encounter for  "triage in pregnant patient 07/09/2023     Priority: Medium    Polyp of colon 05/25/2022     Priority: Medium    Depressive disorder 04/02/2021     Priority: Medium    Seasonal allergies 07/01/2019     Priority: Medium    Anxiety 06/13/2018     Priority: Medium    Diverticulosis of large intestine 06/13/2018     Priority: Medium    Hemorrhoids, internal 06/13/2018     Priority: Medium    History of colonic polyps 06/13/2018     Priority: Medium    Morbid obesity with BMI of 40.0-44.9, adult (H) 01/28/2016     Priority: Medium    Prior fetal anencephaly in third trimester, antepartum 01/28/2016     Priority: Medium        Past Medical History:    No past medical history on file.    Past Surgical History:    No past surgical history on file.    Family History:    No family history on file.    Social History:  Marital Status:  Single [1]  Social History     Tobacco Use    Smoking status: Never    Smokeless tobacco: Never        Medications:    ciprofloxacin (CIPRO) 500 MG tablet  folic acid (FOLVITE) 1 MG tablet  metroNIDAZOLE (FLAGYL) 500 MG tablet  oxyCODONE (ROXICODONE) 5 MG tablet          Review of Systems   All other systems reviewed and are negative.      Physical Exam   BP: (!) 135/92  Pulse: 109  Temp: 98.1  F (36.7  C)  Resp: 18  Height: 162.6 cm (5' 4\")  Weight: 118.8 kg (262 lb)  SpO2: 99 %      Physical Exam  Vitals and nursing note reviewed.   Constitutional:       General: She is not in acute distress.     Appearance: She is well-developed and normal weight. She is not ill-appearing.   HENT:      Head: Normocephalic and atraumatic.      Right Ear: Ear canal normal. A middle ear effusion is present.      Left Ear: Tympanic membrane and ear canal normal.      Mouth/Throat:      Mouth: Mucous membranes are moist. Mucous membranes are pale.      Pharynx: Oropharynx is clear. Uvula midline.      Tonsils: 2+ on the right. 2+ on the left.      Comments: Just right of the midline and on the tip of the patient's " tongue there is a small 1-2 mm firm slightly white nodule  Eyes:      Conjunctiva/sclera: Conjunctivae normal.      Pupils: Pupils are equal, round, and reactive to light.   Cardiovascular:      Rate and Rhythm: Normal rate and regular rhythm.      Heart sounds: Normal heart sounds.   Pulmonary:      Effort: Pulmonary effort is normal.      Breath sounds: Normal breath sounds.   Abdominal:      General: Bowel sounds are normal.      Palpations: Abdomen is soft.   Lymphadenopathy:      Cervical: Cervical adenopathy present.   Skin:     General: Skin is warm and dry.      Capillary Refill: Capillary refill takes less than 2 seconds.   Neurological:      General: No focal deficit present.      Mental Status: She is alert and oriented to person, place, and time.   Psychiatric:         Mood and Affect: Mood normal.         Behavior: Behavior normal.         ED Course        Procedures              Results for orders placed or performed during the hospital encounter of 01/15/25 (from the past 24 hours)   Group A Streptococcus PCR Throat Swab    Specimen: Throat; Swab   Result Value Ref Range    Group A strep by PCR Not Detected Not Detected    Narrative    The Xpert Xpress Strep A test, performed on the [employer], is a rapid, qualitative in vitro diagnostic test for the detection of Streptococcus pyogenes (Group A ß-hemolytic Streptococcus, Strep A) in throat swab specimens from patients with signs and symptoms of pharyngitis. The Xpert Xpress Strep A test can be used as an aid in the diagnosis of Group A Streptococcal pharyngitis. The assay is not intended to monitor treatment for Group A Streptococcus infections. The Xpert Xpress Strep A test utilizes an automated real-time polymerase chain reaction (PCR) to detect Streptococcus pyogenes DNA.   Influenza A/B, RSV and SARS-CoV2 PCR (COVID-19) Nose    Specimen: Nose; Swab   Result Value Ref Range    Influenza A PCR Negative Negative    Influenza B PCR  Negative Negative    RSV PCR Negative Negative    SARS CoV2 PCR Negative Negative    Narrative    Testing was performed using the Xpert Xpress CoV2/Flu/RSV Assay on the [employer] Gene[employer]pert Instrument. This test should be ordered for the detection of SARS-CoV2, influenza, and RSV viruses in individuals with signs and symptoms of respiratory tract infection. This test is for in vitro diagnostic use under the [place] FDA for laboratories certified under CLIA to perform high or moderate complexity testing. This test has been [place] FDA cleared. A negative result does not rule out the presence of PCR inhibitors in the specimen or target RNA in concentration below the limit of detection for the assay. If only one viral target is positive but coinfection with multiple targets is suspected, the sample should be re-tested with another FDA cleared, approved, or authorized test, if coninfection would change clinical management. This test was validated by the [facility]. These laboratories are certified under the Clinical Laboratory Improvement Amendments of 1988 (CLIA-88) as qualified to perfom high complexity laboratory testing.   12:17 AM      Medications - No data to display    Assessments & Plan (with Medical Decision Making)     I have reviewed the nursing notes.    I have reviewed the findings, diagnosis, plan and need for follow up with the patient.        New Prescriptions    No medications on file       Final diagnoses:   Tongue lesion   GRACE (middle ear effusion), right   Cervical lymphadenopathy       1/15/2025   [facility] EMERGENCY DEPT     have gone undetected. Please consider this when interpreting information found in this chart.      I have reviewed the nursing notes.    I have reviewed the findings, diagnosis, plan and need for follow up with the patient.        New Prescriptions    No medications on file       Final diagnoses:   Tongue lesion   GRACE (middle ear effusion), right   Cervical lymphadenopathy       1/15/2025   [facility] EMERGENCY DEPT       Darrel Barrios MD  01/18/25 1946

## 2025-01-16 NOTE — DISCHARGE INSTRUCTIONS
Your swabs today were negative for COVID/flu/RSV, negative strep.    I placed an ENT  referral for you; you should receive a call from them in a couple of days to see if they can get you in any sooner than what you have told me about the beginning of February.  With respect to the middle ear effusion performing a Valsalva maneuver (gently pressurizing your ears as you would if you were going up an airplane or for scuba diving) can help to clear some of this fluid.  You may also try an oral decongestant for a few days.

## 2025-01-16 NOTE — ED TRIAGE NOTES
Patient reports left ear pain since April and over the past two weeks left sided throat pain.     Triage Assessment (Adult)       Row Name 01/15/25 2930          Triage Assessment    Airway WDL WDL        Respiratory WDL    Respiratory WDL WDL        Skin Circulation/Temperature WDL    Skin Circulation/Temperature WDL WDL        Cardiac WDL    Cardiac WDL WDL        Peripheral/Neurovascular WDL    Peripheral Neurovascular WDL WDL        Cognitive/Neuro/Behavioral WDL    Cognitive/Neuro/Behavioral WDL WDL

## 2025-01-17 ENCOUNTER — TELEPHONE (OUTPATIENT)
Dept: OTOLARYNGOLOGY | Facility: CLINIC | Age: 35
End: 2025-01-17
Payer: COMMERCIAL

## 2025-01-17 NOTE — TELEPHONE ENCOUNTER
Left message for pt to call back-   I was reviewing her chart and it looks like she had some ear concerns. I recommend scheduling an audiogram prior to her appointment on 1/28. Left the phone number to schedule an audiogram.   Elaine Sinclair RN on 1/17/2025 at 1:09 PM

## 2025-01-22 NOTE — TELEPHONE ENCOUNTER
Left message for pt to call back to schedule audio prior to appointment next week with Dr. Jorgensen.   Elaine Sinclair, RN on 1/22/2025 at 1:15 PM

## 2025-01-24 ENCOUNTER — HOSPITAL ENCOUNTER (EMERGENCY)
Facility: CLINIC | Age: 35
Discharge: HOME | End: 2025-01-24
Attending: EMERGENCY MEDICINE | Admitting: EMERGENCY MEDICINE
Payer: COMMERCIAL

## 2025-01-24 VITALS
OXYGEN SATURATION: 100 % | HEIGHT: 64 IN | RESPIRATION RATE: 18 BRPM | BODY MASS INDEX: 44.56 KG/M2 | DIASTOLIC BLOOD PRESSURE: 77 MMHG | WEIGHT: 261 LBS | SYSTOLIC BLOOD PRESSURE: 122 MMHG | HEART RATE: 78 BPM | TEMPERATURE: 98 F

## 2025-01-24 DIAGNOSIS — M62.830 BACK MUSCLE SPASM: ICD-10-CM

## 2025-01-24 DIAGNOSIS — R20.2 PARESTHESIAS: ICD-10-CM

## 2025-01-24 DIAGNOSIS — M54.42 ACUTE MIDLINE LOW BACK PAIN WITH BILATERAL SCIATICA: ICD-10-CM

## 2025-01-24 DIAGNOSIS — M54.41 ACUTE MIDLINE LOW BACK PAIN WITH BILATERAL SCIATICA: ICD-10-CM

## 2025-01-24 PROCEDURE — 99283 EMERGENCY DEPT VISIT LOW MDM: CPT | Performed by: EMERGENCY MEDICINE

## 2025-01-24 PROCEDURE — 99284 EMERGENCY DEPT VISIT MOD MDM: CPT | Performed by: EMERGENCY MEDICINE

## 2025-01-24 RX ORDER — METHYLPREDNISOLONE 4 MG/1
TABLET ORAL
Qty: 21 TABLET | Refills: 0 | Status: SHIPPED | OUTPATIENT
Start: 2025-01-24

## 2025-01-24 RX ORDER — CYCLOBENZAPRINE HCL 10 MG
10 TABLET ORAL 3 TIMES DAILY PRN
Qty: 15 TABLET | Refills: 0 | Status: SHIPPED | OUTPATIENT
Start: 2025-01-24

## 2025-01-24 ASSESSMENT — COLUMBIA-SUICIDE SEVERITY RATING SCALE - C-SSRS
1. IN THE PAST MONTH, HAVE YOU WISHED YOU WERE DEAD OR WISHED YOU COULD GO TO SLEEP AND NOT WAKE UP?: NO
6. HAVE YOU EVER DONE ANYTHING, STARTED TO DO ANYTHING, OR PREPARED TO DO ANYTHING TO END YOUR LIFE?: NO
2. HAVE YOU ACTUALLY HAD ANY THOUGHTS OF KILLING YOURSELF IN THE PAST MONTH?: NO

## 2025-01-25 NOTE — DISCHARGE INSTRUCTIONS
Take steroid pack as prescribed.    You can try anti-inflammatory medications in addition to acetaminophen as needed for pain.    Outpatient MRI has been ordered, and you should follow-up with spine clinic as well.    Be seen if new or severe worsening of symptoms develop.

## 2025-01-25 NOTE — ED TRIAGE NOTES
Pt here with back pain and bilateral leg numbness, also reports numbness radiating down R arm     Triage Assessment (Adult)       Row Name 01/24/25 2002          Triage Assessment    Airway WDL WDL        Respiratory WDL    Respiratory WDL WDL        Skin Circulation/Temperature WDL    Skin Circulation/Temperature WDL WDL        Cardiac WDL    Cardiac WDL WDL        Peripheral/Neurovascular WDL    Peripheral Neurovascular WDL WDL        Cognitive/Neuro/Behavioral WDL    Cognitive/Neuro/Behavioral WDL WDL

## 2025-01-25 NOTE — ED PROVIDER NOTES
ED Provider Note  [facility]      History     Chief Complaint   Patient presents with    Back Pain     HPI  Huong Meredith is a 34 year old female who has medical history significant for obesity, degenerative disc disease, anxiety, presenting the emergency department with concerns regarding low back pain which does radiate into the bilateral lower extremities.  States that she has history of degenerative disc disease, with issue with sciatica before in the past, however that involve the left lower extremity.  Now reports approximately 1 week history of bilateral lower extremity symptoms including paresthesias of the lateral and anterior aspects of the thigh.  No significant pain radiation down the lower extremities.  Denies any trauma, fall, or injury.  Denies any fever.  No history of low back surgeries.  Patient also reports approximately 1 week history of upper thoracic pain and cramping and muscle spasm type sensation which is located between the spine and the scapula of primarily the right side.  Does have occasional paresthesias of the right anterior chest, that she feels is movement related as well.        Independent Historian:        Review of External Notes:          Allergies:  Allergies   Allergen Reactions    Miconazole     Adhesive Tape Itching and Rash    Blood-Group Specific Substance Unknown     Patient has Probable Passive Anti-D.  Blood Product orders may be delayed.  Draw one red top and two purple top tubes for ALL Type and Screen/ Type and Crossmatch orders.    Buspirone Rash    Calamine Rash    Nickel Chloride  [Nickel] Rash       Problem List:    Patient Active Problem List    Diagnosis Date Noted    Acute bilateral low back pain with bilateral sciatica 04/01/2024     Priority: Medium    Encounter for triage in pregnant patient 07/09/2023     Priority: Medium    Polyp of colon 05/25/2022     Priority: Medium    Depressive disorder 04/02/2021     Priority: Medium     "Seasonal allergies 07/01/2019     Priority: Medium    Anxiety 06/13/2018     Priority: Medium    Diverticulosis of large intestine 06/13/2018     Priority: Medium    Hemorrhoids, internal 06/13/2018     Priority: Medium    History of colonic polyps 06/13/2018     Priority: Medium    Morbid obesity with BMI of 40.0-44.9, adult (H) 01/28/2016     Priority: Medium    Prior fetal anencephaly in third trimester, antepartum 01/28/2016     Priority: Medium        Past Medical History:    No past medical history on file.    Past Surgical History:    No past surgical history on file.    Family History:    No family history on file.    Social History:  Marital Status:  Single [1]  Social History     Tobacco Use    Smoking status: Never    Smokeless tobacco: Never        Medications:    cyclobenzaprine (FLEXERIL) 10 MG tablet  methylPREDNISolone (MEDROL DOSEPAK) 4 MG tablet therapy pack  ciprofloxacin (CIPRO) 500 MG tablet  folic acid (FOLVITE) 1 MG tablet  metroNIDAZOLE (FLAGYL) 500 MG tablet  oxyCODONE (ROXICODONE) 5 MG tablet          Review of Systems  A medically appropriate review of systems was performed with pertinent positives and negatives noted in the HPI, and all other systems negative.    Physical Exam   Patient Vitals for the past 24 hrs:   BP Temp Temp src Pulse Resp SpO2 Height Weight   01/24/25 2001 122/77 98  F (36.7  C) Oral 78 18 100 % 1.626 m (5' 4\") 118.4 kg (261 lb)          Physical Exam  General: alert and in no acute distress on arrival  Head: atraumatic, normocephalic  Lungs:  nonlabored  CV:  extremities warm and perfused  Abd: nondistended  Skin: no rashes, no diaphoresis and skin color normal  Neuro: Patient awake, alert, speech is fluent,   Psychiatric: affect/mood normal,        ED Course                 Procedures                          No results found for this or any previous visit (from the past 24 hours).    MEDICATIONS GIVEN IN THE EMERGENCY DEPARTMENT:  Medications - No data to " display        Independent Interpretation (X-rays, CTs, rhythm strip):  None    Consultations/Discussion of Management or Tests:         Social Determinants of Health affecting care:         Assessments & Plan (with Medical Decision Making)  34 year old female who presents to the Emergency Department for evaluation of low back pain with numbness and tingling of the lateral aspect of the bilateral lower extremities in addition to the anterior thighs.  Symptoms present over the past 1 week.  No red flag symptoms on history, with no red flag signs on exam.  Patient with what appears to be sciatica type symptoms, and will treat with Medrol Dosepak.  Patient also complains of parathoracic muscle spasms.  Will treat with Flexeril.  Once again, no more concerning history that would suggest alternative etiology.  No ripping or tearing sensation to suggest dissection, or other alternative cause.  Reassurance provided.  Spine referral placed.  I will order outpatient MRI to facilitate workup, and treatment.     I have reviewed the nursing notes.    I have reviewed the findings, diagnosis, plan and need for follow up with the patient.         Medical Decision Making  The patient's presentation was of moderate complexity (an undiagnosed new problem with uncertain prognosis).    The patient's evaluation involved:  history and exam without other MDM data elements    The patient's management necessitated moderate risk (prescription drug management including medications given in the ED).        NEW PRESCRIPTIONS STARTED AT TODAY'S ER VISIT  New Prescriptions    CYCLOBENZAPRINE (FLEXERIL) 10 MG TABLET    Take 1 tablet (10 mg) by mouth 3 times daily as needed for muscle spasms.    METHYLPREDNISOLONE (MEDROL DOSEPAK) 4 MG TABLET THERAPY PACK    Follow Package Directions       Final diagnoses:   Acute midline low back pain with bilateral sciatica   Paresthesias   Back muscle spasm       1/24/2025   [facility] EMERGENCY  DEPT       Derrick, Geovani Renee MD  01/24/25 5152

## 2025-01-25 NOTE — ED NOTES
"Patient is PCA and cares for immobile patient. Is reporting pain in low back both right and left sides. Denies medial or spine pain. Patient states pain goes down bilateral legs all the way from hips to feet, medial and lateral.  Patient also reports right scapula pain that radiates to below right clavicle and describes it as \"burning.\"  Patient has been having pain for approximately 2 weeks now.  Has not tried Ibuprofen or Tylenol, has been taking hot baths for pain relief.    "

## 2025-02-25 ENCOUNTER — APPOINTMENT (OUTPATIENT)
Dept: CT IMAGING | Facility: CLINIC | Age: 35
End: 2025-02-25
Attending: FAMILY MEDICINE
Payer: COMMERCIAL

## 2025-02-25 ENCOUNTER — HOSPITAL ENCOUNTER (OUTPATIENT)
Facility: CLINIC | Age: 35
Setting detail: OBSERVATION
Discharge: HOME | End: 2025-02-25
Attending: FAMILY MEDICINE | Admitting: FAMILY MEDICINE
Payer: COMMERCIAL

## 2025-02-25 VITALS
TEMPERATURE: 98 F | OXYGEN SATURATION: 98 % | DIASTOLIC BLOOD PRESSURE: 69 MMHG | SYSTOLIC BLOOD PRESSURE: 109 MMHG | HEART RATE: 77 BPM

## 2025-02-25 DIAGNOSIS — R93.5 ABNORMAL CT OF THE ABDOMEN: ICD-10-CM

## 2025-02-25 PROBLEM — M54.42 ACUTE BILATERAL LOW BACK PAIN WITH BILATERAL SCIATICA: Status: ACTIVE | Noted: 2024-04-01

## 2025-02-25 PROBLEM — M54.41 ACUTE BILATERAL LOW BACK PAIN WITH BILATERAL SCIATICA: Status: ACTIVE | Noted: 2024-04-01

## 2025-02-25 PROBLEM — F41.9 ANXIETY: Status: ACTIVE | Noted: 2018-06-13

## 2025-02-25 PROBLEM — K52.9 GASTROENTERITIS: Status: ACTIVE | Noted: 2025-02-25

## 2025-02-25 PROBLEM — F32.A DEPRESSIVE DISORDER: Status: ACTIVE | Noted: 2021-04-02

## 2025-02-25 LAB
ALBUMIN SERPL BCG-MCNC: 3.9 G/DL (ref 3.5–5.2)
ALBUMIN UR-MCNC: NEGATIVE MG/DL
ALP SERPL-CCNC: 134 U/L (ref 40–150)
ALT SERPL W P-5'-P-CCNC: 19 U/L (ref 0–50)
ANION GAP SERPL CALCULATED.3IONS-SCNC: 9 MMOL/L (ref 7–15)
APPEARANCE UR: CLEAR
AST SERPL W P-5'-P-CCNC: 26 U/L (ref 0–45)
BILIRUB SERPL-MCNC: 0.2 MG/DL
BILIRUB UR QL STRIP: NEGATIVE
BUN SERPL-MCNC: 14.8 MG/DL (ref 6–20)
CALCIUM SERPL-MCNC: 9.5 MG/DL (ref 8.8–10.4)
CHLORIDE SERPL-SCNC: 106 MMOL/L (ref 98–107)
COLOR UR AUTO: NORMAL
CREAT SERPL-MCNC: 0.78 MG/DL (ref 0.51–0.95)
EGFRCR SERPLBLD CKD-EPI 2021: >90 ML/MIN/1.73M2
ERYTHROCYTE [DISTWIDTH] IN BLOOD BY AUTOMATED COUNT: 12.3 % (ref 10–15)
GLUCOSE SERPL-MCNC: 105 MG/DL (ref 70–99)
GLUCOSE UR STRIP-MCNC: NEGATIVE MG/DL
HCG SERPL QL: NEGATIVE
HCO3 SERPL-SCNC: 24 MMOL/L (ref 22–29)
HCT VFR BLD AUTO: 40.1 % (ref 35–47)
HGB BLD-MCNC: 13.3 G/DL (ref 11.7–15.7)
HGB UR QL STRIP: NEGATIVE
HOLD SPECIMEN: NORMAL
KETONES UR STRIP-MCNC: NEGATIVE MG/DL
LEUKOCYTE ESTERASE UR QL STRIP: NEGATIVE
LIPASE SERPL-CCNC: 20 U/L (ref 13–60)
MCH RBC QN AUTO: 28.9 PG (ref 26.5–33)
MCHC RBC AUTO-ENTMCNC: 33.2 G/DL (ref 31.5–36.5)
MCV RBC AUTO: 87 FL (ref 78–100)
NITRATE UR QL: NEGATIVE
PH UR STRIP: 5.5 [PH] (ref 5–7)
PLATELET # BLD AUTO: 312 10E3/UL (ref 150–450)
POTASSIUM SERPL-SCNC: 4.2 MMOL/L (ref 3.4–5.3)
PROT SERPL-MCNC: 7.5 G/DL (ref 6.4–8.3)
RBC # BLD AUTO: 4.6 10E6/UL (ref 3.8–5.2)
SODIUM SERPL-SCNC: 139 MMOL/L (ref 135–145)
SP GR UR STRIP: 1.01 (ref 1–1.03)
UROBILINOGEN UR STRIP-MCNC: NORMAL MG/DL
WBC # BLD AUTO: 11 10E3/UL (ref 4–11)

## 2025-02-25 PROCEDURE — 85014 HEMATOCRIT: CPT | Performed by: FAMILY MEDICINE

## 2025-02-25 PROCEDURE — 84703 CHORIONIC GONADOTROPIN ASSAY: CPT | Performed by: FAMILY MEDICINE

## 2025-02-25 PROCEDURE — 96376 TX/PRO/DX INJ SAME DRUG ADON: CPT | Performed by: FAMILY MEDICINE

## 2025-02-25 PROCEDURE — 96375 TX/PRO/DX INJ NEW DRUG ADDON: CPT | Performed by: FAMILY MEDICINE

## 2025-02-25 PROCEDURE — 74177 CT ABD & PELVIS W/CONTRAST: CPT

## 2025-02-25 PROCEDURE — 250N000011 HC RX IP 250 OP 636: Performed by: FAMILY MEDICINE

## 2025-02-25 PROCEDURE — 99285 EMERGENCY DEPT VISIT HI MDM: CPT | Performed by: FAMILY MEDICINE

## 2025-02-25 PROCEDURE — 85048 AUTOMATED LEUKOCYTE COUNT: CPT | Performed by: FAMILY MEDICINE

## 2025-02-25 PROCEDURE — G0378 HOSPITAL OBSERVATION PER HR: HCPCS

## 2025-02-25 PROCEDURE — 36415 COLL VENOUS BLD VENIPUNCTURE: CPT | Performed by: FAMILY MEDICINE

## 2025-02-25 PROCEDURE — 99285 EMERGENCY DEPT VISIT HI MDM: CPT | Mod: 25 | Performed by: FAMILY MEDICINE

## 2025-02-25 PROCEDURE — 81003 URINALYSIS AUTO W/O SCOPE: CPT | Performed by: FAMILY MEDICINE

## 2025-02-25 PROCEDURE — 96374 THER/PROPH/DIAG INJ IV PUSH: CPT | Mod: 59 | Performed by: FAMILY MEDICINE

## 2025-02-25 PROCEDURE — 83690 ASSAY OF LIPASE: CPT | Performed by: FAMILY MEDICINE

## 2025-02-25 PROCEDURE — 250N000009 HC RX 250: Performed by: FAMILY MEDICINE

## 2025-02-25 PROCEDURE — 84155 ASSAY OF PROTEIN SERUM: CPT | Performed by: FAMILY MEDICINE

## 2025-02-25 PROCEDURE — 82040 ASSAY OF SERUM ALBUMIN: CPT | Performed by: FAMILY MEDICINE

## 2025-02-25 RX ORDER — ONDANSETRON 4 MG/1
4 TABLET, ORALLY DISINTEGRATING ORAL EVERY 6 HOURS PRN
Status: DISCONTINUED | OUTPATIENT
Start: 2025-02-25 | End: 2025-02-25 | Stop reason: HOSPADM

## 2025-02-25 RX ORDER — AMOXICILLIN 250 MG
1 CAPSULE ORAL 2 TIMES DAILY PRN
Status: DISCONTINUED | OUTPATIENT
Start: 2025-02-25 | End: 2025-02-25 | Stop reason: HOSPADM

## 2025-02-25 RX ORDER — ONDANSETRON 2 MG/ML
4 INJECTION INTRAMUSCULAR; INTRAVENOUS EVERY 6 HOURS PRN
Status: DISCONTINUED | OUTPATIENT
Start: 2025-02-25 | End: 2025-02-25 | Stop reason: HOSPADM

## 2025-02-25 RX ORDER — KETOROLAC TROMETHAMINE 30 MG/ML
30 INJECTION, SOLUTION INTRAMUSCULAR; INTRAVENOUS EVERY 6 HOURS PRN
Status: DISCONTINUED | OUTPATIENT
Start: 2025-02-25 | End: 2025-02-25 | Stop reason: HOSPADM

## 2025-02-25 RX ORDER — NALOXONE HYDROCHLORIDE 0.4 MG/ML
0.2 INJECTION, SOLUTION INTRAMUSCULAR; INTRAVENOUS; SUBCUTANEOUS
Status: DISCONTINUED | OUTPATIENT
Start: 2025-02-25 | End: 2025-02-25 | Stop reason: HOSPADM

## 2025-02-25 RX ORDER — PROCHLORPERAZINE MALEATE 5 MG/1
10 TABLET ORAL EVERY 6 HOURS PRN
Status: DISCONTINUED | OUTPATIENT
Start: 2025-02-25 | End: 2025-02-25 | Stop reason: HOSPADM

## 2025-02-25 RX ORDER — NALOXONE HYDROCHLORIDE 0.4 MG/ML
0.4 INJECTION, SOLUTION INTRAMUSCULAR; INTRAVENOUS; SUBCUTANEOUS
Status: DISCONTINUED | OUTPATIENT
Start: 2025-02-25 | End: 2025-02-25 | Stop reason: HOSPADM

## 2025-02-25 RX ORDER — HYDROMORPHONE HYDROCHLORIDE 1 MG/ML
0.5 INJECTION, SOLUTION INTRAMUSCULAR; INTRAVENOUS; SUBCUTANEOUS
Status: DISCONTINUED | OUTPATIENT
Start: 2025-02-25 | End: 2025-02-25

## 2025-02-25 RX ORDER — SODIUM CHLORIDE 9 MG/ML
INJECTION, SOLUTION INTRAVENOUS CONTINUOUS
Status: DISCONTINUED | OUTPATIENT
Start: 2025-02-25 | End: 2025-02-25 | Stop reason: HOSPADM

## 2025-02-25 RX ORDER — ONDANSETRON 2 MG/ML
4 INJECTION INTRAMUSCULAR; INTRAVENOUS ONCE
Status: COMPLETED | OUTPATIENT
Start: 2025-02-25 | End: 2025-02-25

## 2025-02-25 RX ORDER — AMOXICILLIN 250 MG
2 CAPSULE ORAL 2 TIMES DAILY PRN
Status: DISCONTINUED | OUTPATIENT
Start: 2025-02-25 | End: 2025-02-25 | Stop reason: HOSPADM

## 2025-02-25 RX ORDER — HYDROMORPHONE HCL IN WATER/PF 6 MG/30 ML
0.2 PATIENT CONTROLLED ANALGESIA SYRINGE INTRAVENOUS
Status: DISCONTINUED | OUTPATIENT
Start: 2025-02-25 | End: 2025-02-25 | Stop reason: HOSPADM

## 2025-02-25 RX ORDER — KETOROLAC TROMETHAMINE 15 MG/ML
15 INJECTION, SOLUTION INTRAMUSCULAR; INTRAVENOUS ONCE
Status: COMPLETED | OUTPATIENT
Start: 2025-02-25 | End: 2025-02-25

## 2025-02-25 RX ORDER — IOPAMIDOL 755 MG/ML
128 INJECTION, SOLUTION INTRAVASCULAR ONCE
Status: COMPLETED | OUTPATIENT
Start: 2025-02-25 | End: 2025-02-25

## 2025-02-25 RX ORDER — OXYCODONE HYDROCHLORIDE 5 MG/1
5 TABLET ORAL
Status: DISCONTINUED | OUTPATIENT
Start: 2025-02-25 | End: 2025-02-25 | Stop reason: HOSPADM

## 2025-02-25 RX ADMIN — ONDANSETRON 4 MG: 2 INJECTION, SOLUTION INTRAMUSCULAR; INTRAVENOUS at 10:39

## 2025-02-25 RX ADMIN — HYDROMORPHONE HYDROCHLORIDE 0.5 MG: 1 INJECTION, SOLUTION INTRAMUSCULAR; INTRAVENOUS; SUBCUTANEOUS at 10:39

## 2025-02-25 RX ADMIN — HYDROMORPHONE HYDROCHLORIDE 0.5 MG: 1 INJECTION, SOLUTION INTRAMUSCULAR; INTRAVENOUS; SUBCUTANEOUS at 14:00

## 2025-02-25 RX ADMIN — SODIUM CHLORIDE 72 ML: 9 INJECTION, SOLUTION INTRAVENOUS at 10:59

## 2025-02-25 RX ADMIN — IOPAMIDOL 128 ML: 755 INJECTION, SOLUTION INTRAVENOUS at 10:59

## 2025-02-25 RX ADMIN — KETOROLAC TROMETHAMINE 15 MG: 15 INJECTION, SOLUTION INTRAMUSCULAR; INTRAVENOUS at 10:39

## 2025-02-25 ASSESSMENT — ENCOUNTER SYMPTOMS
CHILLS: 0
DIARRHEA: 1
DYSURIA: 0
NAUSEA: 1
SHORTNESS OF BREATH: 0
FLANK PAIN: 0
COUGH: 0
FEVER: 0
VOMITING: 0
ABDOMINAL PAIN: 1
BLOOD IN STOOL: 0

## 2025-02-25 ASSESSMENT — ACTIVITIES OF DAILY LIVING (ADL)
ADLS_ACUITY_SCORE: 41

## 2025-02-25 NOTE — ED TRIAGE NOTES
Patient started having upper medial abdominal pain last night. Nausea without emesis present. Diarrhea x2 this am. Hx of diverticulitis. Last normal bm was yesterday.

## 2025-02-25 NOTE — PROGRESS NOTES
Reviewing chart due to high probability of admit to Med/Surg unit.     Ted Fiore RN on 2/25/2025 at 4:32 PM

## 2025-02-25 NOTE — ED NOTES
Pt ready for discharge after tolerating crackers and water.   Discharge papers given to pt.   Pt has follow up tomorrow   Statement Selected

## 2025-02-25 NOTE — CONSULTS
Surgical Consultation  [facility] General Surgery    Huong is seen in consultation for abdominal pain, at the request of Dr. Barrios.    Chief Complaint:  abdominal pain    History of Present Illness: Huong Meredith is a 34 year old female presents with abdominal pain. The pain localizes to her left upper quadrant. She developed this pain after eating at [employer]. She has prior episodes of abdominal pain that were found to be diverticulitis. She had no history of abdominal surgery.   She presented to ED and underwent a work up for her abdominal pain. She was found to have portal venous gas on her CT scan.   She does not have any other symptoms presently. Her abdominal pain has improved.   She is not nauseous or vomiting. She has no fevers or chills. She feels well.   She has been to ED in recent few years for sciatica, and diverticulitis.     Patient Active Problem List   Diagnosis    Anxiety    Diverticulosis of large intestine    Hemorrhoids, internal    History of colonic polyps    Morbid obesity with BMI of 40.0-44.9, adult (H)    Depressive disorder    Seasonal allergies    Prior fetal anencephaly in third trimester, antepartum    Polyp of colon    Encounter for triage in pregnant patient    Acute bilateral low back pain with bilateral sciatica       No past medical history on file.    No past surgical history on file.    No family history on file.    Social History     Tobacco Use    Smoking status: Never    Smokeless tobacco: Never   Substance Use Topics    Alcohol use: Not on file        History   Drug Use Not on file       Current Outpatient Medications   Medication Sig Dispense Refill    ciprofloxacin (CIPRO) 500 MG tablet Take 1 tablet (500 mg) by mouth 2 times daily. 14 tablet 0    cyclobenzaprine (FLEXERIL) 10 MG tablet Take 1 tablet (10 mg) by mouth 3 times daily as needed for muscle spasms. 15 tablet 0    folic acid (FOLVITE) 1 MG tablet 4 mg      methylPREDNISolone (MEDROL DOSEPAK) 4 MG  tablet therapy pack Follow Package Directions 21 tablet 0    metroNIDAZOLE (FLAGYL) 500 MG tablet Take 1 tablet (500 mg) by mouth 3 times daily 14 tablet 0    oxyCODONE (ROXICODONE) 5 MG tablet Take 1 tablet (5 mg) by mouth every 6 hours as needed for severe pain. 6 tablet 0       Allergies   Allergen Reactions    Miconazole     Adhesive Tape Itching and Rash    Blood-Group Specific Substance Unknown     Patient has Probable Passive Anti-D.  Blood Product orders may be delayed.  Draw one red top and two purple top tubes for ALL Type and Screen/ Type and Crossmatch orders.    Buspirone Rash    Calamine Rash    Nickel Chloride  [Nickel] Rash       Review of Systems:   Constitutional - Denies fevers, weight loss, malaise, lethargy  Neuro - Denies tremors or seizures  Pulmon - Denies SOB, dyspnea, hemoptysis, chronic cough or use of an inhaler  CV - Denies CP, SOB, lower extremity edema, difficulty w/ stairs, has never used NTG  GI - Denies hematemesis, BRBPR, melena, chronic diarrhea or epigastric pain   - Denies hematuria, difficulty voiding, h/o STDs  Hematology - Denies blood clotting disorders, chronic anemias  Dermatology - No melanomas or skin cancers  Rheumatology - No h/o RA  Pysch - Denies depression, bipolar d/o or schizophrenia    Physical Exam:  /76   Pulse 65   Temp 98  F (36.7  C) (Oral)   SpO2 98%     Constitutional- No acute distress, well nourished, non-toxic  Eyes: Anicteric, no injection.  PERRL  Respiratory- Clear to auscultation bilaterally, good inspiratory effort  Cardiovascular - Heart rate normal  Abdomen - Soft, non-tender, non-distended obese abdomen. No rebound or guarding. No peritoneal signs or tenderness to percussion, no RUQ tenderness. No left lower quadrant tenderness  Neuro - No focal neuro deficits, Alert and oriented x 3  Psych: Appropriate mood and affect  Musculoskeletal: Moves upper and lower extremities appropriately.  Skin: Warm, Dry    Labs  ROUTINE IP LABS (Last  four results)  BMP  Recent Labs   Lab 02/25/25  1001      POTASSIUM 4.2   CHLORIDE 106   BROOKE 9.5   CO2 24   BUN 14.8   CR 0.78   *     CBC  Recent Labs   Lab 02/25/25  1001   WBC 11.0   RBC 4.60   HGB 13.3   HCT 40.1   MCV 87   MCH 28.9   MCHC 33.2   RDW 12.3        INRNo lab results found in last 7 days.   Imaging  Narrative & Impression   EXAM: CT ABDOMEN PELVIS W CONTRAST  LOCATION: [facility]  DATE: 2/25/2025     INDICATION: Left upper quadrant and left mid lateral abdominal pain nausea, diarrhea x 2.  History of diverticulitis  COMPARISON: 10/21/2024  TECHNIQUE: CT scan of the abdomen and pelvis was performed following injection of IV contrast. Multiplanar reformats were obtained. Dose reduction techniques were used.  CONTRAST: 128 mL Isovue 370     FINDINGS:   LOWER CHEST: Normal.     HEPATOBILIARY: Multiple branching foci of portal venous gas throughout the right and left hepatic lobes. No calcified gallstones or biliary ductal dilatation.     PANCREAS: Normal.     SPLEEN: Normal.     ADRENAL GLANDS: Normal.     KIDNEYS/BLADDER: Normal.     BOWEL: No small bowel or colonic obstruction or inflammatory changes. Normal appendix. Mild colonic diverticulosis without CT evidence for acute diverticulitis. No small bowel or colonic wall thickening, edema or pneumatosis. No mesenteric venous gas or   pneumoperitoneum. Previously seen mild sigmoid diverticulitis has resolved.     LYMPH NODES: No lymphadenopathy.     VASCULATURE: No abdominal aortic aneurysm.     PELVIC ORGANS: No pelvic masses. No free fluid or fluid collection.     MUSCULOSKELETAL: No suspicious lesions in the bones.                                                                      IMPRESSION:   1.  Small to moderate amount of portal venous gas. No other acute findings in the abdomen and pelvis. Specifically, no evidence for bowel ischemia, other bowel inflammatory changes, mesenteric venous gas or  pneumoperitoneum. Isolated portal venous gas is   an unusual finding in this young patient, and may be secondary to mild CT occult gastroenteritis/colitis. Please correlate clinically and consider a short interval follow-up CT.     Findings were discussed with Dr. Barrios at 11:30 AM.     Assessment:  1. 34 year old female with left sided abdominal pain, now improved, with CT demonstrating portal venous gas     Plan:   1. Given finding of portal venous gas, but in the picture of normal lab values and benign abdominal exam, it is reasonable to observe patient to monitor progress  2. Recommend serial abdominal exams, repeat lab studies and observation   3. If patient remains with benign abdomen, it is okay to proceed with diet as tolerated  4. If patient develops fever, chills, nausea, vomiting, severe or worsening abdominal pain, place NPO        LYLA TORRES PA-C  2/25/2025 at 3:29 PM

## 2025-02-25 NOTE — ED NOTES
Patient voicing concerns of returning pain and nausea when she gets home specifically after she eats and drinks as she has been NPO all day.   Crackers and water given to patient here for trial.   Render Post-Care Instructions In Note?: no Number Of Freeze-Thaw Cycles: 1 freeze-thaw cycle Consent: The patient's consent was obtained including but not limited to risks of crusting, scabbing, blistering, scarring, darker or lighter pigmentary change, recurrence, incomplete removal and infection. Show Applicator Variable?: Yes Post-Care Instructions: I reviewed with the patient in detail post-care instructions. Patient is to wear sunprotection, and avoid picking at any of the treated lesions. Pt may apply Vaseline to crusted or scabbing areas.  May apply bandaid if desired. Duration Of Freeze Thaw-Cycle (Seconds): 5 Detail Level: Detailed

## 2025-02-25 NOTE — ED PROVIDER NOTES
History     Chief Complaint   Patient presents with    Abdominal Pain     HPI  Huong Meredith is a 34 year old female, past medical history is significant for bilateral low back pain with bilateral sciatica, depression, seasonal allergies, anxiety, diverticulosis, hemorrhoids, morbid obesity, presents to the emergency department concerns of abdominal pain, nausea, diarrhea.  History is obtained from the patient who presents with her significant other.  She began with slightly to the left of epigastric abdominal pain with radiation around into the left back area yesterday evening shortly after eating a pulled pork sandwich at [facility].  The food tasted fine.  No one else ate pulled pork sandwich.  She began with the pain which she describes as cramping severe, gassy kept her up most of the night did not sleep well and now the pain is more centered around the left mid abdomen laterally.  She had 2 loose bowel movements this morning and presents with concerns that she has acute diverticulitis which she has had before and this feels somewhat similar to her.  She denies fever chills or sweats.  There is nausea but no vomiting.  She has not tried anything for the pain.  She denies urinary tract symptoms.  Denies the possibility of pregnancy.  She has had no abdominal surgeries by her account.    Allergies:  Allergies   Allergen Reactions    Miconazole     Adhesive Tape Itching and Rash    Blood-Group Specific Substance Unknown     Patient has Probable Passive Anti-D.  Blood Product orders may be delayed.  Draw one red top and two purple top tubes for ALL Type and Screen/ Type and Crossmatch orders.    Buspirone Rash    Calamine Rash    Nickel Chloride  [Nickel] Rash       Problem List:    Patient Active Problem List    Diagnosis Date Noted    Acute bilateral low back pain with bilateral sciatica 04/01/2024     Priority: Medium    Encounter for triage in pregnant patient 07/09/2023     Priority: Medium    Polyp  of colon 05/25/2022     Priority: Medium    Depressive disorder 04/02/2021     Priority: Medium    Seasonal allergies 07/01/2019     Priority: Medium    Anxiety 06/13/2018     Priority: Medium    Diverticulosis of large intestine 06/13/2018     Priority: Medium    Hemorrhoids, internal 06/13/2018     Priority: Medium    History of colonic polyps 06/13/2018     Priority: Medium    Morbid obesity with BMI of 40.0-44.9, adult (H) 01/28/2016     Priority: Medium    Prior fetal anencephaly in third trimester, antepartum 01/28/2016     Priority: Medium        Past Medical History:    No past medical history on file.    Past Surgical History:    No past surgical history on file.    Family History:    No family history on file.    Social History:  Marital Status:  Single [1]  Social History     Tobacco Use    Smoking status: Never    Smokeless tobacco: Never        Medications:    ciprofloxacin (CIPRO) 500 MG tablet  cyclobenzaprine (FLEXERIL) 10 MG tablet  folic acid (FOLVITE) 1 MG tablet  methylPREDNISolone (MEDROL DOSEPAK) 4 MG tablet therapy pack  metroNIDAZOLE (FLAGYL) 500 MG tablet  oxyCODONE (ROXICODONE) 5 MG tablet          Review of Systems   All other systems reviewed and are negative.      Physical Exam   BP: 122/87  Pulse: 65  Temp: 98  F (36.7  C)  SpO2: 99 %      Physical Exam  Vitals and nursing note reviewed.   Constitutional:       Appearance: She is well-developed and normal weight.   HENT:      Head: Normocephalic and atraumatic.      Mouth/Throat:      Mouth: Mucous membranes are moist.      Pharynx: Oropharynx is clear.   Eyes:      Extraocular Movements: Extraocular movements intact.      Pupils: Pupils are equal, round, and reactive to light.   Cardiovascular:      Rate and Rhythm: Normal rate and regular rhythm.      Heart sounds: Normal heart sounds.   Pulmonary:      Effort: Pulmonary effort is normal.      Breath sounds: Normal breath sounds.   Abdominal:      Comments: Soft, bowel sounds present  tender left upper quadrant, left lateral abdomen, left CVA.  Voluntary guarding.  No rebound no referred pain.     Skin:     General: Skin is warm and dry.      Capillary Refill: Capillary refill takes less than 2 seconds.   Neurological:      General: No focal deficit present.      Mental Status: She is alert.   Psychiatric:         Mood and Affect: Mood normal.         Behavior: Behavior normal.         ED Course        Procedures              Results for orders placed or performed during the hospital encounter of 02/25/25 (from the past 24 hours)   [place] Draw    Narrative    The following orders were created for panel order [place] Draw.  Procedure                               Abnormality         Status                     ---------                               -----------         ------                     Extra Blue Top Tube[[id number]]                              Final result               Extra Red Top Tube[[id number]]                               Final result               Extra Green Top (Lithium...[[id number]]                      Final result               Extra Purple Top Tube[[id number]]                            Final result                 Please view results for these tests on the individual orders.   Extra Blue Top Tube   Result Value Ref Range    Hold Specimen JIC    Extra Red Top Tube   Result Value Ref Range    Hold Specimen JIC    Extra Green Top (Lithium Heparin) Tube   Result Value Ref Range    Hold Specimen JIC    Extra Purple Top Tube   Result Value Ref Range    Hold Specimen JIC    CBC with platelets   Result Value Ref Range    WBC Count 11.0 4.0 - 11.0 10e3/uL    RBC Count 4.60 3.80 - 5.20 10e6/uL    Hemoglobin 13.3 11.7 - 15.7 g/dL    Hematocrit 40.1 35.0 - 47.0 %    MCV 87 78 - 100 fL    MCH 28.9 26.5 - 33.0 pg    MCHC 33.2 31.5 - 36.5 g/dL    RDW 12.3 10.0 - 15.0 %    Platelet Count 312 150 - 450 10e3/uL   Comprehensive metabolic panel   Result Value Ref Range    Sodium 139 135 - 145  mmol/L    Potassium 4.2 3.4 - 5.3 mmol/L    Carbon Dioxide (CO2) 24 22 - 29 mmol/L    Anion Gap 9 7 - 15 mmol/L    Urea Nitrogen 14.8 6.0 - 20.0 mg/dL    Creatinine 0.78 0.51 - 0.95 mg/dL    GFR Estimate >90 >60 mL/min/1.73m2    Calcium 9.5 8.8 - 10.4 mg/dL    Chloride 106 98 - 107 mmol/L    Glucose 105 (H) 70 - 99 mg/dL    Alkaline Phosphatase 134 40 - 150 U/L    AST 26 0 - 45 U/L    ALT 19 0 - 50 U/L    Protein Total 7.5 6.4 - 8.3 g/dL    Albumin 3.9 3.5 - 5.2 g/dL    Bilirubin Total 0.2 <=1.2 mg/dL   HCG qualitative pregnancy (blood)   Result Value Ref Range    hCG Serum Qualitative Negative Negative   Lipase   Result Value Ref Range    Lipase 20 13 - 60 U/L   CT Abdomen Pelvis w Contrast    Narrative    EXAM: CT ABDOMEN PELVIS W CONTRAST  LOCATION: [facility]  DATE: 2/25/2025    INDICATION: Left upper quadrant and left mid lateral abdominal pain nausea, diarrhea x 2.  History of diverticulitis  COMPARISON: 10/21/2024  TECHNIQUE: CT scan of the abdomen and pelvis was performed following injection of IV contrast. Multiplanar reformats were obtained. Dose reduction techniques were used.  CONTRAST: 128 mL Isovue 370    FINDINGS:   LOWER CHEST: Normal.    HEPATOBILIARY: Multiple branching foci of portal venous gas throughout the right and left hepatic lobes. No calcified gallstones or biliary ductal dilatation.    PANCREAS: Normal.    SPLEEN: Normal.    ADRENAL GLANDS: Normal.    KIDNEYS/BLADDER: Normal.    BOWEL: No small bowel or colonic obstruction or inflammatory changes. Normal appendix. Mild colonic diverticulosis without CT evidence for acute diverticulitis. No small bowel or colonic wall thickening, edema or pneumatosis. No mesenteric venous gas or   pneumoperitoneum. Previously seen mild sigmoid diverticulitis has resolved.    LYMPH NODES: No lymphadenopathy.    VASCULATURE: No abdominal aortic aneurysm.    PELVIC ORGANS: No pelvic masses. No free fluid or fluid  collection.    MUSCULOSKELETAL: No suspicious lesions in the bones.      Impression    IMPRESSION:   1.  Small to moderate amount of portal venous gas. No other acute findings in the abdomen and pelvis. Specifically, no evidence for bowel ischemia, other bowel inflammatory changes, mesenteric venous gas or pneumoperitoneum. Isolated portal venous gas is   an unusual finding in this young patient, and may be secondary to mild CT occult gastroenteritis/colitis. Please correlate clinically and consider a short interval follow-up CT.    Findings were discussed with Dr. Barrios at 11:30 AM.   UA Macroscopic with reflex to Microscopic and Culture    Specimen: Urine, Midstream   Result Value Ref Range    Color Urine Straw Colorless, Straw, Light Yellow, Yellow    Appearance Urine Clear Clear    Glucose Urine Negative Negative mg/dL    Bilirubin Urine Negative Negative    Ketones Urine Negative Negative mg/dL    Specific Gravity Urine 1.010 1.003 - 1.035    Blood Urine Negative Negative    pH Urine 5.5 5.0 - 7.0    Protein Albumin Urine Negative Negative mg/dL    Urobilinogen Urine Normal Normal, 2.0 mg/dL    Nitrite Urine Negative Negative    Leukocyte Esterase Urine Negative Negative    Narrative    Microscopic not indicated   11:31 AM  Contacted by the interpreting radiologist regarding this patient's CT scan identifying the finding of portal venous gas and no other abnormality on the abdominal pelvis scan of significance.  The patient does have diverticuli but no evidence of diverticulitis.  No evidence of bowel ischemia.  I plan to discuss this with the general surgeon on-call.      2:07 PM  Discussed with Omar Howard for the hospitalist service who agrees to accept the patient to observation status however we have no beds available at the present time so the patient for the moment will be an  border.  Transition orders placed.  Medications   HYDROmorphone (PF) (DILAUDID) injection 0.5 mg (0.5 mg Intravenous $Given  2/25/25 1400)   ketorolac (TORADOL) injection 15 mg (15 mg Intravenous $Given 2/25/25 1039)   ondansetron (ZOFRAN) injection 4 mg (4 mg Intravenous $Given 2/25/25 1039)   iopamidol (ISOVUE-370) solution 128 mL (128 mLs Intravenous $Given 2/25/25 1059)   sodium chloride 0.9 % bag 500 mL for CT scan flush use (72 mLs Intravenous $Given 2/25/25 1059)       Assessments & Plan (with Medical Decision Making)   Assessment and plan with medical decision making at the time stamp above.      Disclaimer: This note consists of symbols derived from keyboarding, dictation and/or voice recognition software. As a result, there may be errors in the script that have gone undetected. Please consider this when interpreting information found in this chart.      I have reviewed the nursing notes.    I have reviewed the findings, diagnosis, plan and need for follow up with the patient.        New Prescriptions    No medications on file       Final diagnoses:   Abnormal CT of the abdomen - Portal venous gas       2/25/2025   [facility] EMERGENCY DEPT       Darrel Barrios MD  02/25/25 [id number]

## 2025-02-25 NOTE — H&P
[facility]    Combined History/Physical and Discharge Summary  Hospital Medicine       Date of Admission:  2/25/2025  Date of Service: 2/25/2025   Date of Discharge: 2/25/2025    Assessment & Plan   Huong Meredith is a 34 year old female who presents on 2/25/2025 with abdominal pain, nausea, and diarrhea. On evaluation likely to have gastroenteritis. However, CT abn/pelvis showing portal venous gas without evidence for bowel ischemia. General surgery consulted. Patient initially to be observed overnight for abnormal CT finding, however after further discussion with patient and general surgery it is appropriate to discharge home and return if worsening symptoms. She is to follow-up with general surgery in clinic 2/2625.    Gastroenteritis  Abnormal CT scan, portal venous gas without evidence for bowel ischemia    Developed LLQ pain, nausea without vomiting, and diarrhea shortly after meal. H/o diverticulitis. No fever or leukocytosis. No electrolyte abnormalities. CT abdomen/pelvis showing small to moderate amount of portal venous gas. No other acute findings in the abdomen and pelvis. Specifically, no evidence for bowel ischemia, other bowel inflammatory changes, mesenteric venous gas or pneumoperitoneum. Isolated portal venous gas is an unusual finding in this young patient, and may be secondary to mild CT occult gastroenteritis/colitis. Please correlate clinically and consider a short interval follow-up CT. General surgery consulted and in agreement with observing patient overnight for portal venous gas on CT. Given improvement in symptoms, reassuring vital signs, reassuring CT scan outside of abnormal finding, and normal labs it would be appropriate to either observe overnight or discharge home with close follow-up with general surgery 2/27 or 2/28. Patient preferring to discharge home. She was instructed to return to emergency department for further evaluation if she were to develop  worsening nausea, vomiting, or abdominal pain that is not controlled with OTC medication such acetaminophen.  - Follow-up with general surgery in clinic 2/26  - Ok to resume regular diet    Depression  Anxiety    Mentioned in history, does not seen to be on PTA medication for this.    Sleep apnea, suspect    Recently prescribed referral for sleep study. Patient has periods of breathing cessation during sleep noted by spouse.  - Continue with sleep study as outpatient    Diet: Regula adult diet  DVT Prophylaxis: Low Risk/Ambulatory with no VTE prophylaxis indicated  Garcia Catheter: Not present  Code Status: Full code  Lines: PIV    Disposition Plan   Medically Ready for Discharge: Anticipated Tomorrow    I have discussed patient and formulated plan with attending hospitalist physician, Dr. Nba Howard M.D.    SALONI LOPEZ PA-C        Primary Care Physician   Lora Nava [phone]    History is obtained from the patient (reliable), emergency department physician, and review of old records via the EMR.    History of Present Illness   Huong Meredith is a 34 year old female with past medical history of sigmoid diverticulitis, depression, anxiety, seasonal allergies, obesity, suspect sleep apnea,  now presents on 2/25/2025 with abdominal pain, nausea without vomiting, and diarrhea < 24 hours.    Huong presents for abdominal pain which started within hours after eating a pulled pork sandwich yesterday at [facility]. The food tasted fine and did not have concern about it being spoiled. No others with her had pork. Abdominal pain started near left flank and moved to spanning the upper abdomen bilaterally. The pain is crampy in nature. She has noticed increased gas and had loose stools which have continued into this morning. She also has associated nausea without vomiting. She was concerned about her symptoms being caused from diverticulitis and states they are similar to her previous 3 diverticular  flares last year. Denies melena or hematochezia. No fever or chills. She has not tried eating anything since her symptoms. She has also not tried anything for her pain. Denies urinary symptoms.    Review of Systems   Review of Systems   Constitutional:  Negative for chills and fever.   Respiratory:  Negative for cough and shortness of breath.    Gastrointestinal:  Positive for abdominal pain (Left sided abdominal pain with radiation to mid upper abdomen), diarrhea (Loose stools 2/24) and nausea. Negative for blood in stool, melena and vomiting.   Genitourinary:  Negative for dysuria and flank pain.      Past Medical History    No past medical history on file.    Past Surgical History   No past surgical history on file.     Prior to Admission Medications   Prior to Admission Medications   Prescriptions Last Dose Informant Patient Reported? Taking?   ciprofloxacin (CIPRO) 500 MG tablet   No No   Sig: Take 1 tablet (500 mg) by mouth 2 times daily.   cyclobenzaprine (FLEXERIL) 10 MG tablet   No No   Sig: Take 1 tablet (10 mg) by mouth 3 times daily as needed for muscle spasms.   folic acid (FOLVITE) 1 MG tablet   Yes No   Si mg   methylPREDNISolone (MEDROL DOSEPAK) 4 MG tablet therapy pack   No No   Sig: Follow Package Directions   metroNIDAZOLE (FLAGYL) 500 MG tablet   No No   Sig: Take 1 tablet (500 mg) by mouth 3 times daily   oxyCODONE (ROXICODONE) 5 MG tablet   No No   Sig: Take 1 tablet (5 mg) by mouth every 6 hours as needed for severe pain.      Facility-Administered Medications: None     Allergies   Allergies   Allergen Reactions    Miconazole     Adhesive Tape Itching and Rash    Blood-Group Specific Substance Unknown     Patient has Probable Passive Anti-D.  Blood Product orders may be delayed.  Draw one red top and two purple top tubes for ALL Type and Screen/ Type and Crossmatch orders.    Buspirone Rash    Calamine Rash    Nickel Chloride  [Nickel] Rash     Family History    No family history on  file.    Social History   Social History     Socioeconomic History    Marital status: Single     Spouse name: Not on file    Number of children: Not on file    Years of education: Not on file    Highest education level: Not on file   Occupational History    Not on file   Tobacco Use    Smoking status: Never    Smokeless tobacco: Never   Substance and Sexual Activity    Alcohol use: Not on file    Drug use: Not on file    Sexual activity: Not on file   Other Topics Concern    Not on file   Social History Narrative    Not on file     Social Drivers of Health     Financial Resource Strain: Low Risk  (3/21/2024)    Financial Resource Strain     Within the past 12 months, have you or your family members you live with been unable to get utilities (heat, electricity) when it was really needed?: No   Food Insecurity: Low Risk  (3/21/2024)    Food Insecurity     Within the past 12 months, did you worry that your food would run out before you got money to buy more?: No     Within the past 12 months, did the food you bought just not last and you didn t have money to get more?: No   Transportation Needs: Low Risk  (3/21/2024)    Transportation Needs     Within the past 12 months, has lack of transportation kept you from medical appointments, getting your medicines, non-medical meetings or appointments, work, or from getting things that you need?: No   Physical Activity: Not on file   Stress: Not on file   Social Connections: Not on file   Interpersonal Safety: Not on file   Housing Stability: Low Risk  (3/21/2024)    Housing Stability     Do you have housing? : Yes     Are you worried about losing your housing?: No     Physical Exam   /76   Pulse 65   Temp 98  F (36.7  C) (Oral)   SpO2 98%      Weight: 0 lbs 0 oz There is no height or weight on file to calculate BMI.     Constitutional: Sitting upright in bed comfortably, WDWN female, alert, oriented, cooperative, no apparent distress, appears nontoxic.  Eyes: Eyes are  clear, pupils are reactive.  HENT: Oropharynx is clear and moist. No evidence of cranial trauma.  Cardiovascular: Regular rate and rhythm, normal S1 and S2, and no murmur noted. JVP is normal. Good peripheral pulses in wrists bilaterally. No lower extremity edema bilaterally.  Respiratory: Clear to auscultation bilaterally. Normal respiratory effort on RA. Speaking in full sentences.  GI: Obese, soft, non-distended, tenderness to LLQ, no rebound tenderness, normal bowel sounds.  Genitourinary: Deferred  Musculoskeletal: Normal muscle bulk and tone. Moving extremities appropriately.  Skin: Warm and dry, no rashes.   Neurologic: Neck supple. Cranial nerves are grossly intact.    Data   Data reviewed today:     I have personally reviewed the following data over the past 24 hrs:    11.0  \   13.3   / 312     139 106 14.8 /  105 (H)   4.2 24 0.78 \     ALT: 19 AST: 26 AP: 134 TBILI: 0.2   ALB: 3.9 TOT PROTEIN: 7.5 LIPASE: 20       Recent Results (from the past 24 hours)   CT Abdomen Pelvis w Contrast    Narrative    EXAM: CT ABDOMEN PELVIS W CONTRAST  LOCATION: [facility]  DATE: 2/25/2025    INDICATION: Left upper quadrant and left mid lateral abdominal pain nausea, diarrhea x 2.  History of diverticulitis  COMPARISON: 10/21/2024  TECHNIQUE: CT scan of the abdomen and pelvis was performed following injection of IV contrast. Multiplanar reformats were obtained. Dose reduction techniques were used.  CONTRAST: 128 mL Isovue 370    FINDINGS:   LOWER CHEST: Normal.    HEPATOBILIARY: Multiple branching foci of portal venous gas throughout the right and left hepatic lobes. No calcified gallstones or biliary ductal dilatation.    PANCREAS: Normal.    SPLEEN: Normal.    ADRENAL GLANDS: Normal.    KIDNEYS/BLADDER: Normal.    BOWEL: No small bowel or colonic obstruction or inflammatory changes. Normal appendix. Mild colonic diverticulosis without CT evidence for acute diverticulitis. No small bowel or  colonic wall thickening, edema or pneumatosis. No mesenteric venous gas or   pneumoperitoneum. Previously seen mild sigmoid diverticulitis has resolved.    LYMPH NODES: No lymphadenopathy.    VASCULATURE: No abdominal aortic aneurysm.    PELVIC ORGANS: No pelvic masses. No free fluid or fluid collection.    MUSCULOSKELETAL: No suspicious lesions in the bones.      Impression    IMPRESSION:   1.  Small to moderate amount of portal venous gas. No other acute findings in the abdomen and pelvis. Specifically, no evidence for bowel ischemia, other bowel inflammatory changes, mesenteric venous gas or pneumoperitoneum. Isolated portal venous gas is   an unusual finding in this young patient, and may be secondary to mild CT occult gastroenteritis/colitis. Please correlate clinically and consider a short interval follow-up CT.    Findings were discussed with Dr. Barrios at 11:30 AM.

## 2025-02-26 ENCOUNTER — PATIENT OUTREACH (OUTPATIENT)
Dept: CARE COORDINATION | Facility: CLINIC | Age: 35
End: 2025-02-26

## 2025-02-26 ENCOUNTER — OFFICE VISIT (OUTPATIENT)
Dept: SURGERY | Facility: CLINIC | Age: 35
End: 2025-02-26
Payer: COMMERCIAL

## 2025-02-26 VITALS
BODY MASS INDEX: 45.17 KG/M2 | DIASTOLIC BLOOD PRESSURE: 77 MMHG | WEIGHT: 264.6 LBS | SYSTOLIC BLOOD PRESSURE: 112 MMHG | HEIGHT: 64 IN | TEMPERATURE: 97.7 F | HEART RATE: 89 BPM

## 2025-02-26 DIAGNOSIS — R10.9 ABDOMINAL PAIN, UNSPECIFIED ABDOMINAL LOCATION: ICD-10-CM

## 2025-02-26 DIAGNOSIS — E66.01 MORBID OBESITY WITH BODY MASS INDEX (BMI) OF 45.0 TO 49.9 IN ADULT (H): ICD-10-CM

## 2025-02-26 DIAGNOSIS — R93.5 ABNORMAL CT OF THE ABDOMEN: Primary | ICD-10-CM

## 2025-02-26 PROCEDURE — 99243 OFF/OP CNSLTJ NEW/EST LOW 30: CPT | Performed by: SURGERY

## 2025-02-26 ASSESSMENT — PAIN SCALES - GENERAL: PAINLEVEL_OUTOF10: MILD PAIN (2)

## 2025-02-26 NOTE — LETTER
"2/26/2025      Huong Meredith  [address] [address]      Dear Colleague,    Thank you for referring your patient, Huong Meredith, to the [facility]. Please see a copy of my visit note below.      Assessment & Plan  Problem List Items Addressed This Visit          Digestive    Morbid obesity with BMI of 40.0-44.9, adult (H)       Other    Abnormal CT of the abdomen - Primary     Other Visit Diagnoses       Abdominal pain, unspecified abdominal location               35 yo F with nonspecific abdominal pain with incidental finding of nonspecific portal venous gas on CT abd/pel  -I do not recommend any surgical intervention as pt has only intermittent tolerable abdominal pain  -I don't have a clear etiology to why pt has portal venous gas or if her abdominal pain is related it.   -pt is tolerating foods, having flatus.   -I recommend continuing with her usual and if she develops any new symptoms (I.e fevers; chills, N/V, or worsening pain), she is to go to ED for re-evaluation.    -all of her questions were answered.     Face to Face/patient Contact total time: 15 minutes  Pre Charting time: 10 minutes; Post charting time, communication and other activities: 5 minutes;   Total time:  30 minutes         BMI  Estimated body mass index is 45.42 kg/m  as calculated from the following:    Height as of this encounter: 1.626 m (5' 4\").    Weight as of this encounter: 120 kg (264 lb 9.6 oz).       No follow-ups on file.      Subjective  Huong is a 34 year old, presenting for the following health issues:  Consult (LUQ pain)    LUQ pain and achiness that started Tuesday AM.   This pain is still present but intermittent and now has similar symptom on the RUQ side  Had excessive flatus and bumping; felt burning when she did this.   Hx of diverticulitis in the past and also was concern for a GI bug.   Been eating cereal (milk) and crackers and no issues.   No nausea; no vomiting  No " "fevers ; no chills  Having flatus; no BM.             Review of Systems  Constitutional, HEENT, cardiovascular, pulmonary, gi and gu systems are negative, except as otherwise noted.      Objective   /77 (BP Location: Right arm, Patient Position: Sitting, Cuff Size: Adult Large)   Pulse 89   Temp 97.7  F (36.5  C) (Tympanic)   Ht 1.626 m (5' 4\")   Wt 120 kg (264 lb 9.6 oz)   BMI 45.42 kg/m    Body mass index is 45.42 kg/m .  Physical Exam  Vitals reviewed.   Eyes:      Conjunctiva/sclera: Conjunctivae normal.   Cardiovascular:      Pulses: Normal pulses.   Pulmonary:      Effort: Pulmonary effort is normal.   Abdominal:      General: There is no distension.      Palpations: Abdomen is soft.      Tenderness: There is abdominal tenderness. There is no guarding or rebound.   Musculoskeletal:         General: Normal range of motion.      Cervical back: Normal range of motion.   Skin:     General: Skin is warm.   Neurological:      General: No focal deficit present.   Psychiatric:         Mood and Affect: Mood normal.        EXAM: CT ABDOMEN PELVIS W CONTRAST  LOCATION: [facility]  DATE: 2/25/2025     INDICATION: Left upper quadrant and left mid lateral abdominal pain nausea, diarrhea x 2.  History of diverticulitis  COMPARISON: 10/21/2024  TECHNIQUE: CT scan of the abdomen and pelvis was performed following injection of IV contrast. Multiplanar reformats were obtained. Dose reduction techniques were used.  CONTRAST: 128 mL Isovue 370     FINDINGS:   LOWER CHEST: Normal.     HEPATOBILIARY: Multiple branching foci of portal venous gas throughout the right and left hepatic lobes. No calcified gallstones or biliary ductal dilatation.     PANCREAS: Normal.     SPLEEN: Normal.     ADRENAL GLANDS: Normal.     KIDNEYS/BLADDER: Normal.     BOWEL: No small bowel or colonic obstruction or inflammatory changes. Normal appendix. Mild colonic diverticulosis without CT evidence for acute " diverticulitis. No small bowel or colonic wall thickening, edema or pneumatosis. No mesenteric venous gas or   pneumoperitoneum. Previously seen mild sigmoid diverticulitis has resolved.     LYMPH NODES: No lymphadenopathy.     VASCULATURE: No abdominal aortic aneurysm.     PELVIC ORGANS: No pelvic masses. No free fluid or fluid collection.     MUSCULOSKELETAL: No suspicious lesions in the bones.                                                                      IMPRESSION:   1.  Small to moderate amount of portal venous gas. No other acute findings in the abdomen and pelvis. Specifically, no evidence for bowel ischemia, other bowel inflammatory changes, mesenteric venous gas or pneumoperitoneum. Isolated portal venous gas is   an unusual finding in this young patient, and may be secondary to mild CT occult gastroenteritis/colitis. Please correlate clinically and consider a short interval follow-up CT.     Findings were discussed with Dr. aBrrios at 11:30 AM.        Signed Electronically by: Drea Brito MD        Again, thank you for allowing me to participate in the care of your patient.        Sincerely,        Drea Brito MD    Electronically signed

## 2025-02-26 NOTE — PROGRESS NOTES
"  {PROVIDER CHARTING PREFERENCE:228179}    Subjective   Huong is a 34 year old, presenting for the following health issues:  Consult (LUQ pain)    LUQ pain and achiness that started Tuesday AM.   This pain is still present but intermittent and now has similar symptom on the RUQ side  Had excessive flatus and bumping; felt burning when she did this.   Hx of diverticulitis in the past and also was concern for a GI bug.   Been eating cereal (milk) and crackers and no issues.   No nausea; no vomiting  No fevers ; no chills  Having flatus; no BM.           {ROS Picklists (Optional):170250}      Objective    /77 (BP Location: Right arm, Patient Position: Sitting, Cuff Size: Adult Large)   Pulse 89   Temp 97.7  F (36.5  C) (Tympanic)   Ht 1.626 m (5' 4\")   Wt 120 kg (264 lb 9.6 oz)   BMI 45.42 kg/m    Body mass index is 45.42 kg/m .  Physical Exam   {Exam List (Optional):953718}    {Diagnostic Test Results (Optional):887260}        Signed Electronically by: Drea Brito MD  {Email feedback regarding this note to [email or web address]   :[id number]}  " "Position: Sitting, Cuff Size: Adult Large)   Pulse 89   Temp 97.7  F (36.5  C) (Tympanic)   Ht 1.626 m (5' 4\")   Wt 120 kg (264 lb 9.6 oz)   BMI 45.42 kg/m    Body mass index is 45.42 kg/m .  Physical Exam  Vitals reviewed.   Eyes:      Conjunctiva/sclera: Conjunctivae normal.   Cardiovascular:      Pulses: Normal pulses.   Pulmonary:      Effort: Pulmonary effort is normal.   Abdominal:      General: There is no distension.      Palpations: Abdomen is soft.      Tenderness: There is abdominal tenderness. There is no guarding or rebound.   Musculoskeletal:         General: Normal range of motion.      Cervical back: Normal range of motion.   Skin:     General: Skin is warm.   Neurological:      General: No focal deficit present.   Psychiatric:         Mood and Affect: Mood normal.        EXAM: CT ABDOMEN PELVIS W CONTRAST  LOCATION: [facility]  DATE: 2/25/2025     INDICATION: Left upper quadrant and left mid lateral abdominal pain nausea, diarrhea x 2.  History of diverticulitis  COMPARISON: 10/21/2024  TECHNIQUE: CT scan of the abdomen and pelvis was performed following injection of IV contrast. Multiplanar reformats were obtained. Dose reduction techniques were used.  CONTRAST: 128 mL Isovue 370     FINDINGS:   LOWER CHEST: Normal.     HEPATOBILIARY: Multiple branching foci of portal venous gas throughout the right and left hepatic lobes. No calcified gallstones or biliary ductal dilatation.     PANCREAS: Normal.     SPLEEN: Normal.     ADRENAL GLANDS: Normal.     KIDNEYS/BLADDER: Normal.     BOWEL: No small bowel or colonic obstruction or inflammatory changes. Normal appendix. Mild colonic diverticulosis without CT evidence for acute diverticulitis. No small bowel or colonic wall thickening, edema or pneumatosis. No mesenteric venous gas or   pneumoperitoneum. Previously seen mild sigmoid diverticulitis has resolved.     LYMPH NODES: No lymphadenopathy.     VASCULATURE: No " abdominal aortic aneurysm.     PELVIC ORGANS: No pelvic masses. No free fluid or fluid collection.     MUSCULOSKELETAL: No suspicious lesions in the bones.                                                                      IMPRESSION:   1.  Small to moderate amount of portal venous gas. No other acute findings in the abdomen and pelvis. Specifically, no evidence for bowel ischemia, other bowel inflammatory changes, mesenteric venous gas or pneumoperitoneum. Isolated portal venous gas is   an unusual finding in this young patient, and may be secondary to mild CT occult gastroenteritis/colitis. Please correlate clinically and consider a short interval follow-up CT.     Findings were discussed with Dr. Barrios at 11:30 AM.        Signed Electronically by: Drea Brito MD

## 2025-02-26 NOTE — PROGRESS NOTES
[facility] Center: [facility]    Background: Transitional Care Management program identified per system criteria and reviewed by [facility] team for possible outreach.    Assessment: Upon chart review, [facility] Team member will not proceed with patient outreach related to this episode of Transitional Care Management program due to reason below:    Patient has a follow up appointment with an appropriate provider today for hospital discharge    Plan: Transitional Care Management episode addressed appropriately per reason noted above.      JUWAN Hagan  [facility]    *Connected Care Resource Team does NOT follow patient ongoing. Referrals are identified based on internal discharge reports and the outreach is to ensure patient has an understanding of their discharge instructions.

## 2025-02-26 NOTE — NURSING NOTE
"Initial /77 (BP Location: Right arm, Patient Position: Sitting, Cuff Size: Adult Large)   Pulse 89   Temp 97.7  F (36.5  C) (Tympanic)   Ht 1.626 m (5' 4\")   Wt 120 kg (264 lb 9.6 oz)   BMI 45.42 kg/m   Estimated body mass index is 45.42 kg/m  as calculated from the following:    Height as of this encounter: 1.626 m (5' 4\").    Weight as of this encounter: 120 kg (264 lb 9.6 oz). .  Rose Damon MA    "

## 2025-06-15 ENCOUNTER — APPOINTMENT (OUTPATIENT)
Dept: CT IMAGING | Facility: CLINIC | Age: 35
End: 2025-06-15
Attending: FAMILY MEDICINE
Payer: COMMERCIAL

## 2025-06-15 ENCOUNTER — HOSPITAL ENCOUNTER (EMERGENCY)
Facility: CLINIC | Age: 35
Discharge: HOME | End: 2025-06-16
Attending: FAMILY MEDICINE | Admitting: FAMILY MEDICINE
Payer: COMMERCIAL

## 2025-06-15 VITALS
SYSTOLIC BLOOD PRESSURE: 139 MMHG | WEIGHT: 264 LBS | BODY MASS INDEX: 45.07 KG/M2 | HEART RATE: 98 BPM | HEIGHT: 64 IN | OXYGEN SATURATION: 99 % | RESPIRATION RATE: 18 BRPM | DIASTOLIC BLOOD PRESSURE: 80 MMHG | TEMPERATURE: 98.5 F

## 2025-06-15 DIAGNOSIS — R10.32 LLQ ABDOMINAL PAIN: ICD-10-CM

## 2025-06-15 LAB
ALBUMIN SERPL BCG-MCNC: 3.9 G/DL (ref 3.5–5.2)
ALBUMIN UR-MCNC: NEGATIVE MG/DL
ALP SERPL-CCNC: 117 U/L (ref 40–150)
ALT SERPL W P-5'-P-CCNC: 10 U/L (ref 0–50)
ANION GAP SERPL CALCULATED.3IONS-SCNC: 10 MMOL/L (ref 7–15)
APPEARANCE UR: CLEAR
AST SERPL W P-5'-P-CCNC: 16 U/L (ref 0–45)
BASOPHILS # BLD AUTO: 0.1 10E3/UL (ref 0–0.2)
BASOPHILS NFR BLD AUTO: 1 %
BILIRUB SERPL-MCNC: 0.2 MG/DL
BILIRUB UR QL STRIP: NEGATIVE
BUN SERPL-MCNC: 8.6 MG/DL (ref 6–20)
CALCIUM SERPL-MCNC: 9.9 MG/DL (ref 8.8–10.4)
CHLORIDE SERPL-SCNC: 103 MMOL/L (ref 98–107)
COLOR UR AUTO: ABNORMAL
CREAT SERPL-MCNC: 0.92 MG/DL (ref 0.51–0.95)
EGFRCR SERPLBLD CKD-EPI 2021: 84 ML/MIN/1.73M2
EOSINOPHIL # BLD AUTO: 0.2 10E3/UL (ref 0–0.7)
EOSINOPHIL NFR BLD AUTO: 1 %
ERYTHROCYTE [DISTWIDTH] IN BLOOD BY AUTOMATED COUNT: 12.8 % (ref 10–15)
GLUCOSE SERPL-MCNC: 110 MG/DL (ref 70–99)
GLUCOSE UR STRIP-MCNC: NEGATIVE MG/DL
HCG SERPL QL: NEGATIVE
HCO3 SERPL-SCNC: 26 MMOL/L (ref 22–29)
HCT VFR BLD AUTO: 40.7 % (ref 35–47)
HGB BLD-MCNC: 13.6 G/DL (ref 11.7–15.7)
HGB UR QL STRIP: NEGATIVE
IMM GRANULOCYTES # BLD: 0 10E3/UL
IMM GRANULOCYTES NFR BLD: 0 %
KETONES UR STRIP-MCNC: NEGATIVE MG/DL
LEUKOCYTE ESTERASE UR QL STRIP: NEGATIVE
LIPASE SERPL-CCNC: 20 U/L (ref 13–60)
LYMPHOCYTES # BLD AUTO: 3.8 10E3/UL (ref 0.8–5.3)
LYMPHOCYTES NFR BLD AUTO: 29 %
MCH RBC QN AUTO: 28.5 PG (ref 26.5–33)
MCHC RBC AUTO-ENTMCNC: 33.4 G/DL (ref 31.5–36.5)
MCV RBC AUTO: 85 FL (ref 78–100)
MONOCYTES # BLD AUTO: 0.7 10E3/UL (ref 0–1.3)
MONOCYTES NFR BLD AUTO: 6 %
MUCOUS THREADS #/AREA URNS LPF: PRESENT /LPF
NEUTROPHILS # BLD AUTO: 8.4 10E3/UL (ref 1.6–8.3)
NEUTROPHILS NFR BLD AUTO: 63 %
NITRATE UR QL: NEGATIVE
NRBC # BLD AUTO: 0 10E3/UL
NRBC BLD AUTO-RTO: 0 /100
PH UR STRIP: 6.5 [PH] (ref 5–7)
PLATELET # BLD AUTO: 324 10E3/UL (ref 150–450)
POTASSIUM SERPL-SCNC: 4.2 MMOL/L (ref 3.4–5.3)
PROT SERPL-MCNC: 7.7 G/DL (ref 6.4–8.3)
RBC # BLD AUTO: 4.78 10E6/UL (ref 3.8–5.2)
RBC URINE: 1 /HPF
SODIUM SERPL-SCNC: 139 MMOL/L (ref 135–145)
SP GR UR STRIP: 1.02 (ref 1–1.03)
SQUAMOUS EPITHELIAL: 1 /HPF
UROBILINOGEN UR STRIP-MCNC: NORMAL MG/DL
WBC # BLD AUTO: 13.2 10E3/UL (ref 4–11)
WBC URINE: 2 /HPF

## 2025-06-15 PROCEDURE — 250N000011 HC RX IP 250 OP 636: Performed by: FAMILY MEDICINE

## 2025-06-15 PROCEDURE — 81001 URINALYSIS AUTO W/SCOPE: CPT | Performed by: FAMILY MEDICINE

## 2025-06-15 PROCEDURE — 83690 ASSAY OF LIPASE: CPT | Performed by: FAMILY MEDICINE

## 2025-06-15 PROCEDURE — 84703 CHORIONIC GONADOTROPIN ASSAY: CPT | Performed by: FAMILY MEDICINE

## 2025-06-15 PROCEDURE — 99285 EMERGENCY DEPT VISIT HI MDM: CPT | Mod: 25 | Performed by: FAMILY MEDICINE

## 2025-06-15 PROCEDURE — 96374 THER/PROPH/DIAG INJ IV PUSH: CPT | Mod: 59 | Performed by: FAMILY MEDICINE

## 2025-06-15 PROCEDURE — 74177 CT ABD & PELVIS W/CONTRAST: CPT

## 2025-06-15 PROCEDURE — 80053 COMPREHEN METABOLIC PANEL: CPT | Performed by: FAMILY MEDICINE

## 2025-06-15 PROCEDURE — 250N000011 HC RX IP 250 OP 636: Mod: JZ | Performed by: FAMILY MEDICINE

## 2025-06-15 PROCEDURE — 85004 AUTOMATED DIFF WBC COUNT: CPT | Performed by: FAMILY MEDICINE

## 2025-06-15 PROCEDURE — 36415 COLL VENOUS BLD VENIPUNCTURE: CPT | Performed by: FAMILY MEDICINE

## 2025-06-15 PROCEDURE — 99284 EMERGENCY DEPT VISIT MOD MDM: CPT | Performed by: FAMILY MEDICINE

## 2025-06-15 PROCEDURE — 250N000009 HC RX 250: Performed by: FAMILY MEDICINE

## 2025-06-15 RX ORDER — KETOROLAC TROMETHAMINE 15 MG/ML
15 INJECTION, SOLUTION INTRAMUSCULAR; INTRAVENOUS ONCE
Status: COMPLETED | OUTPATIENT
Start: 2025-06-15 | End: 2025-06-15

## 2025-06-15 RX ORDER — IOPAMIDOL 755 MG/ML
129 INJECTION, SOLUTION INTRAVASCULAR ONCE
Status: COMPLETED | OUTPATIENT
Start: 2025-06-15 | End: 2025-06-15

## 2025-06-15 RX ADMIN — IOPAMIDOL 129 ML: 755 INJECTION, SOLUTION INTRAVENOUS at 22:56

## 2025-06-15 RX ADMIN — SODIUM CHLORIDE 72 ML: 9 INJECTION, SOLUTION INTRAVENOUS at 22:56

## 2025-06-15 RX ADMIN — KETOROLAC TROMETHAMINE 15 MG: 15 INJECTION, SOLUTION INTRAMUSCULAR; INTRAVENOUS at 21:34

## 2025-06-15 ASSESSMENT — ACTIVITIES OF DAILY LIVING (ADL)
ADLS_ACUITY_SCORE: 41

## 2025-06-16 RX ORDER — ONDANSETRON 4 MG/1
4 TABLET, ORALLY DISINTEGRATING ORAL EVERY 8 HOURS PRN
Qty: 10 TABLET | Refills: 0 | Status: SHIPPED | OUTPATIENT
Start: 2025-06-16

## 2025-06-16 NOTE — ED PROVIDER NOTES
History     Chief Complaint   Patient presents with    Abdominal Pain     HPI  Huong Meredith is a 34 year old female who presents with a history of diverticulitis and diverticulosis.  She has abdominal pain now that had onset in the upper quadrant of the abdomen left side some burning sensation and now in the left lower quadrant of the abdomen.  Ongoing for the last couple of days.  Looser stools with blood and mucus in the stool.  No associated fever.  Still tolerating p.o.  No significant nausea or vomiting.  No dysuria urgency frequency hematuria.  Denies current pregnancy.  No history of ureteral stones.  Had a vaginal yeast infection last      Allergies:  Allergies   Allergen Reactions    Miconazole     Adhesive Tape Itching and Rash    Blood-Group Specific Substance Unknown     Patient has Probable Passive Anti-D.  Blood Product orders may be delayed.  Draw one red top and two purple top tubes for ALL Type and Screen/ Type and Crossmatch orders.    Buspirone Rash    Calamine Rash    Nickel Chloride  [Nickel] Rash       Problem List:    Patient Active Problem List    Diagnosis Date Noted    Gastroenteritis 02/25/2025     Priority: Medium    Abnormal CT of the abdomen 02/25/2025     Priority: Medium    Acute bilateral low back pain with bilateral sciatica 04/01/2024     Priority: Medium    Encounter for triage in pregnant patient 07/09/2023     Priority: Medium    Polyp of colon 05/25/2022     Priority: Medium    Depressive disorder 04/02/2021     Priority: Medium    Seasonal allergies 07/01/2019     Priority: Medium    Anxiety 06/13/2018     Priority: Medium    Diverticulosis of large intestine 06/13/2018     Priority: Medium    Hemorrhoids, internal 06/13/2018     Priority: Medium    History of colonic polyps 06/13/2018     Priority: Medium    Morbid obesity with BMI of 40.0-44.9, adult (H) 01/28/2016     Priority: Medium    Prior fetal anencephaly in third trimester, antepartum 01/28/2016     Priority:  "Medium        Past Medical History:    No past medical history on file.    Past Surgical History:    No past surgical history on file.    Family History:    No family history on file.    Social History:  Marital Status:  Single [1]  Social History     Tobacco Use    Smoking status: Never    Smokeless tobacco: Never   Substance Use Topics    Alcohol use: Not Currently    Drug use: Never        Medications:    No current outpatient medications on file.        Review of Systems  ROS:  5 point ROS negative except as noted above in HPI, including Gen., Resp., CV, GI &  system review.      Physical Exam   BP: 139/80  Pulse: 98  Temp: 98.5  F (36.9  C)  Resp: 18  Height: 162.6 cm (5' 4\")  Weight: 119.7 kg (264 lb)  SpO2: 99 %      Physical Exam  Constitutional:       General: She is in acute distress.      Appearance: She is not diaphoretic.   Eyes:      Conjunctiva/sclera: Conjunctivae normal.   Cardiovascular:      Rate and Rhythm: Normal rate and regular rhythm.      Heart sounds: No murmur heard.  Pulmonary:      Effort: Pulmonary effort is normal. No respiratory distress.      Breath sounds: Normal breath sounds. No stridor. No wheezing or rhonchi.   Abdominal:      General: Abdomen is flat. There is no distension.      Palpations: Abdomen is soft. There is no mass.      Tenderness: There is abdominal tenderness in the left lower quadrant. There is no right CVA tenderness, left CVA tenderness, guarding or rebound.   Musculoskeletal:      Cervical back: Neck supple.   Neurological:      Mental Status: She is alert.           ED Course        Procedures              Critical Care time:  none     None         Results for orders placed or performed during the hospital encounter of 06/15/25 (from the past 24 hours)   CBC with platelets differential    Narrative    The following orders were created for panel order CBC with platelets differential.  Procedure                               Abnormality         Status          "            ---------                               -----------         ------                     CBC with platelets and ...[[id number]]  Abnormal            Final result                 Please view results for these tests on the individual orders.   Comprehensive metabolic panel   Result Value Ref Range    Sodium 139 135 - 145 mmol/L    Potassium 4.2 3.4 - 5.3 mmol/L    Carbon Dioxide (CO2) 26 22 - 29 mmol/L    Anion Gap 10 7 - 15 mmol/L    Urea Nitrogen 8.6 6.0 - 20.0 mg/dL    Creatinine 0.92 0.51 - 0.95 mg/dL    GFR Estimate 84 >60 mL/min/1.73m2    Calcium 9.9 8.8 - 10.4 mg/dL    Chloride 103 98 - 107 mmol/L    Glucose 110 (H) 70 - 99 mg/dL    Alkaline Phosphatase 117 40 - 150 U/L    AST 16 0 - 45 U/L    ALT 10 0 - 50 U/L    Protein Total 7.7 6.4 - 8.3 g/dL    Albumin 3.9 3.5 - 5.2 g/dL    Bilirubin Total 0.2 <=1.2 mg/dL   Lipase   Result Value Ref Range    Lipase 20 13 - 60 U/L   HCG qualitative pregnancy (blood)   Result Value Ref Range    hCG Serum Qualitative Negative Negative   CBC with platelets and differential   Result Value Ref Range    WBC Count 13.2 (H) 4.0 - 11.0 10e3/uL    RBC Count 4.78 3.80 - 5.20 10e6/uL    Hemoglobin 13.6 11.7 - 15.7 g/dL    Hematocrit 40.7 35.0 - 47.0 %    MCV 85 78 - 100 fL    MCH 28.5 26.5 - 33.0 pg    MCHC 33.4 31.5 - 36.5 g/dL    RDW 12.8 10.0 - 15.0 %    Platelet Count 324 150 - 450 10e3/uL    % Neutrophils 63 %    % Lymphocytes 29 %    % Monocytes 6 %    % Eosinophils 1 %    % Basophils 1 %    % Immature Granulocytes 0 %    NRBCs per 100 WBC 0 <1 /100    Absolute Neutrophils 8.4 (H) 1.6 - 8.3 10e3/uL    Absolute Lymphocytes 3.8 0.8 - 5.3 10e3/uL    Absolute Monocytes 0.7 0.0 - 1.3 10e3/uL    Absolute Eosinophils 0.2 0.0 - 0.7 10e3/uL    Absolute Basophils 0.1 0.0 - 0.2 10e3/uL    Absolute Immature Granulocytes 0.0 <=0.4 10e3/uL    Absolute NRBCs 0.0 10e3/uL   UA with Microscopic reflex to Culture    Specimen: Urine, Clean Catch   Result Value Ref Range    Color Urine Light  Yellow Colorless, Straw, Light Yellow, Yellow    Appearance Urine Clear Clear    Glucose Urine Negative Negative mg/dL    Bilirubin Urine Negative Negative    Ketones Urine Negative Negative mg/dL    Specific Gravity Urine 1.024 1.003 - 1.035    Blood Urine Negative Negative    pH Urine 6.5 5.0 - 7.0    Protein Albumin Urine Negative Negative mg/dL    Urobilinogen Urine Normal Normal mg/dL    Nitrite Urine Negative Negative    Leukocyte Esterase Urine Negative Negative    Mucus Urine Present (A) None Seen /LPF    RBC Urine 1 <=2 /HPF    WBC Urine 2 <=5 /HPF    Squamous Epithelials Urine 1 <=1 /HPF    Narrative    Urine Culture not indicated   CT Abdomen Pelvis w Contrast    Narrative    EXAM: CT ABDOMEN PELVIS W CONTRAST  LOCATION: [facility]  DATE: 6/15/2025    INDICATION: Left lower quadrant abdominal pain and history of diverticulitis.  COMPARISON: CT abdomen and pelvis on 2/25/2025.  TECHNIQUE: CT scan of the abdomen and pelvis was performed after the injection of 129 mL Isovue 370 intravenously. Multiplanar reformats were obtained. Dose reduction techniques were used.    FINDINGS:   LOWER CHEST: Lung bases are clear.    ABDOMEN/PELVIS:    HEPATOBILIARY: No suspicious focal hepatic lesion. No radiodense gallstones.    PANCREAS: No main pancreatic ductal dilatation or definite solid pancreatic mass.    SPLEEN: No splenomegaly.    ADRENAL GLANDS: No adrenal nodules.    KIDNEYS/BLADDER: No radiodense kidney/ureteral stones or hydronephrosis in either kidney.    BOWEL: No abnormally dilated bowel loops. The appendix is visualized and appears normal. Colonic diverticulosis predominantly of the sigmoid colon without CT evidence of acute diverticulitis.     PERITONEUM: No evidence of free fluid in the abdomen and pelvis. No free peritoneal or portal venous gas.    PELVIC ORGANS: Unremarkable.    VASCULATURE: Unremarkable.    LYMPH NODES: No significant abdominopelvic  lymphadenopathy.    MUSCULOSKELETAL: No suspicious osseous lesion.      Impression    IMPRESSION:  1.  No evidence of acute pathology in the abdomen and pelvis.  2.  Colonic diverticulosis predominantly of the sigmoid colon without CT evidence of acute diverticulitis.         Medications   ketorolac (TORADOL) injection 15 mg (has no administration in time range)   iopamidol (ISOVUE-370) solution 129 mL (has no administration in time range)   sodium chloride 0.9 % bag for CT scan flush (has no administration in time range)       Assessments & Plan (with Medical Decision Making)     MDM; Huong Meredith is a 34 year old female senting with abdominal pain left lower quadrant concerns for diverticulitis has had several days of symptoms.  No current fever does have bloody mucous in stool no history of inflammatory bowel    Imaging is reassuring.  No red flag findings.  White count is mildly elevated.  Unclear cause for the small amount of blood that seen in the stool.  If those persist additional workup needed.  However no signs of diverticulitis on evaluation reassuring exam.  Plan discharge home and follow-up with primary provider  I have reviewed the nursing notes.    I have reviewed the findings, diagnosis, plan and need for follow up with the patient.           Medical Decision Making  The patient's presentation was of moderate complexity (an acute illness with systemic symptoms).    The patient's evaluation involved:  ordering and/or review of 3+ test(s) in this encounter (see separate area of note for details)    The patient's management necessitated moderate risk (prescription drug management including medications given in the ED).        New Prescriptions    No medications on file       Final diagnoses:   LLQ abdominal pain - no serious findings. stay hytdrated.,  take ibuprofen, tylenol., stay hydrated. follow-up primary provider        6/15/2025   [facility] EMERGENCY DEPT       Darian Chapman,  MD  06/16/25 0046

## 2025-06-16 NOTE — DISCHARGE INSTRUCTIONS
ICD-10-CM    1. LLQ abdominal pain  R10.32     no serious findings. stay hytdrated.,  take ibuprofen, tylenol., stay hydrated. follow-up primary provider

## 2025-06-16 NOTE — ED TRIAGE NOTES
Pt presents with left upper and left lower abd pain that began yesterday. Pt denies fevers at home. Pt states history of diverticulitis in the past.      Triage Assessment (Adult)       Row Name 06/15/25 2053          Triage Assessment    Airway WDL WDL        Respiratory WDL    Respiratory WDL WDL        Skin Circulation/Temperature WDL    Skin Circulation/Temperature WDL WDL        Cardiac WDL    Cardiac WDL WDL        Peripheral/Neurovascular WDL    Peripheral Neurovascular WDL WDL        Cognitive/Neuro/Behavioral WDL    Cognitive/Neuro/Behavioral WDL WDL

## 2025-08-19 ENCOUNTER — HOSPITAL ENCOUNTER (EMERGENCY)
Facility: CLINIC | Age: 35
Discharge: HOME | End: 2025-08-19
Attending: PHYSICIAN ASSISTANT | Admitting: PHYSICIAN ASSISTANT
Payer: COMMERCIAL

## 2025-08-19 VITALS
TEMPERATURE: 98 F | RESPIRATION RATE: 17 BRPM | HEART RATE: 77 BPM | OXYGEN SATURATION: 98 % | DIASTOLIC BLOOD PRESSURE: 80 MMHG | SYSTOLIC BLOOD PRESSURE: 113 MMHG

## 2025-08-19 DIAGNOSIS — Z20.3 NEED FOR POST EXPOSURE PROPHYLAXIS FOR RABIES: Primary | ICD-10-CM

## 2025-08-19 PROCEDURE — 90471 IMMUNIZATION ADMIN: CPT | Performed by: PHYSICIAN ASSISTANT

## 2025-08-19 PROCEDURE — 96372 THER/PROPH/DIAG INJ SC/IM: CPT | Performed by: PHYSICIAN ASSISTANT

## 2025-08-19 PROCEDURE — 250N000011 HC RX IP 250 OP 636: Mod: JZ | Performed by: PHYSICIAN ASSISTANT

## 2025-08-19 PROCEDURE — 90375 RABIES IG IM/SC: CPT | Mod: JZ | Performed by: PHYSICIAN ASSISTANT

## 2025-08-19 PROCEDURE — 99213 OFFICE O/P EST LOW 20 MIN: CPT | Performed by: PHYSICIAN ASSISTANT

## 2025-08-19 PROCEDURE — 90675 RABIES VACCINE IM: CPT | Performed by: PHYSICIAN ASSISTANT

## 2025-08-19 PROCEDURE — G0463 HOSPITAL OUTPT CLINIC VISIT: HCPCS | Performed by: PHYSICIAN ASSISTANT

## 2025-08-19 RX ADMIN — RABIES IMMUNE GLOBULIN (HUMAN) 2400 UNITS: 300 INJECTION, SOLUTION INFILTRATION; INTRAMUSCULAR at 14:50

## 2025-08-19 RX ADMIN — RABIES VACCINE 1 ML: KIT at 15:00

## 2025-08-19 ASSESSMENT — ACTIVITIES OF DAILY LIVING (ADL)
ADLS_ACUITY_SCORE: 41
ADLS_ACUITY_SCORE: 41

## 2025-08-19 ASSESSMENT — ENCOUNTER SYMPTOMS: CONSTITUTIONAL NEGATIVE: 1

## 2025-08-26 ENCOUNTER — IMMUNIZATION (OUTPATIENT)
Dept: PEDIATRICS | Facility: CLINIC | Age: 35
End: 2025-08-26
Attending: PHYSICIAN ASSISTANT
Payer: COMMERCIAL

## 2025-08-26 VITALS — TEMPERATURE: 98.1 F

## 2025-08-26 DIAGNOSIS — Z23 NEED FOR VACCINATION: Primary | ICD-10-CM

## 2025-08-26 PROCEDURE — 90471 IMMUNIZATION ADMIN: CPT

## 2025-08-26 PROCEDURE — 90675 RABIES VACCINE IM: CPT

## 2025-09-02 ENCOUNTER — IMMUNIZATION (OUTPATIENT)
Dept: PEDIATRICS | Facility: CLINIC | Age: 35
End: 2025-09-02
Attending: PHYSICIAN ASSISTANT
Payer: COMMERCIAL

## 2025-09-02 PROCEDURE — 90675 RABIES VACCINE IM: CPT

## 2025-09-02 PROCEDURE — 90471 IMMUNIZATION ADMIN: CPT
